# Patient Record
Sex: FEMALE | Race: BLACK OR AFRICAN AMERICAN | NOT HISPANIC OR LATINO | Employment: FULL TIME | URBAN - METROPOLITAN AREA
[De-identification: names, ages, dates, MRNs, and addresses within clinical notes are randomized per-mention and may not be internally consistent; named-entity substitution may affect disease eponyms.]

---

## 2017-03-01 ENCOUNTER — APPOINTMENT (OUTPATIENT)
Dept: RADIOLOGY | Facility: MEDICAL CENTER | Age: 22
End: 2017-03-01
Attending: EMERGENCY MEDICINE
Payer: MEDICAID

## 2017-03-01 ENCOUNTER — HOSPITAL ENCOUNTER (EMERGENCY)
Facility: MEDICAL CENTER | Age: 22
End: 2017-03-01
Attending: EMERGENCY MEDICINE
Payer: MEDICAID

## 2017-03-01 VITALS
RESPIRATION RATE: 16 BRPM | SYSTOLIC BLOOD PRESSURE: 122 MMHG | OXYGEN SATURATION: 99 % | TEMPERATURE: 98.6 F | DIASTOLIC BLOOD PRESSURE: 74 MMHG | HEART RATE: 81 BPM | HEIGHT: 64 IN | WEIGHT: 111.77 LBS | BODY MASS INDEX: 19.08 KG/M2

## 2017-03-01 DIAGNOSIS — F29 PSYCHOSIS, UNSPECIFIED PSYCHOSIS TYPE (HCC): ICD-10-CM

## 2017-03-01 LAB
ALBUMIN SERPL BCP-MCNC: 4.3 G/DL (ref 3.2–4.9)
ALBUMIN/GLOB SERPL: 1.4 G/DL
ALP SERPL-CCNC: 41 U/L (ref 30–99)
ALT SERPL-CCNC: 8 U/L (ref 2–50)
ANION GAP SERPL CALC-SCNC: 10 MMOL/L (ref 0–11.9)
AST SERPL-CCNC: 16 U/L (ref 12–45)
BASOPHILS # BLD AUTO: 0.3 % (ref 0–1.8)
BASOPHILS # BLD: 0.02 K/UL (ref 0–0.12)
BILIRUB SERPL-MCNC: 0.4 MG/DL (ref 0.1–1.5)
BUN SERPL-MCNC: 17 MG/DL (ref 8–22)
CALCIUM SERPL-MCNC: 9.6 MG/DL (ref 8.5–10.5)
CHLORIDE SERPL-SCNC: 107 MMOL/L (ref 96–112)
CO2 SERPL-SCNC: 22 MMOL/L (ref 20–33)
CREAT SERPL-MCNC: 0.85 MG/DL (ref 0.5–1.4)
EOSINOPHIL # BLD AUTO: 0.04 K/UL (ref 0–0.51)
EOSINOPHIL NFR BLD: 0.6 % (ref 0–6.9)
ERYTHROCYTE [DISTWIDTH] IN BLOOD BY AUTOMATED COUNT: 39.7 FL (ref 35.9–50)
GFR SERPL CREATININE-BSD FRML MDRD: >60 ML/MIN/1.73 M 2
GLOBULIN SER CALC-MCNC: 3 G/DL (ref 1.9–3.5)
GLUCOSE SERPL-MCNC: 95 MG/DL (ref 65–99)
HCG SERPL QL: NEGATIVE
HCT VFR BLD AUTO: 38.1 % (ref 37–47)
HGB BLD-MCNC: 12.1 G/DL (ref 12–16)
IMM GRANULOCYTES # BLD AUTO: 0.02 K/UL (ref 0–0.11)
IMM GRANULOCYTES NFR BLD AUTO: 0.3 % (ref 0–0.9)
INR PPP: 1.18 (ref 0.87–1.13)
LYMPHOCYTES # BLD AUTO: 1.67 K/UL (ref 1–4.8)
LYMPHOCYTES NFR BLD: 26.6 % (ref 22–41)
MCH RBC QN AUTO: 26.9 PG (ref 27–33)
MCHC RBC AUTO-ENTMCNC: 31.8 G/DL (ref 33.6–35)
MCV RBC AUTO: 84.9 FL (ref 81.4–97.8)
MONOCYTES # BLD AUTO: 0.51 K/UL (ref 0–0.85)
MONOCYTES NFR BLD AUTO: 8.1 % (ref 0–13.4)
NEUTROPHILS # BLD AUTO: 4.03 K/UL (ref 2–7.15)
NEUTROPHILS NFR BLD: 64.1 % (ref 44–72)
NRBC # BLD AUTO: 0 K/UL
NRBC BLD AUTO-RTO: 0 /100 WBC
PLATELET # BLD AUTO: 205 K/UL (ref 164–446)
PMV BLD AUTO: 10.5 FL (ref 9–12.9)
POTASSIUM SERPL-SCNC: 3.9 MMOL/L (ref 3.6–5.5)
PROT SERPL-MCNC: 7.3 G/DL (ref 6–8.2)
PROTHROMBIN TIME: 15.4 SEC (ref 12–14.6)
RBC # BLD AUTO: 4.49 M/UL (ref 4.2–5.4)
SODIUM SERPL-SCNC: 139 MMOL/L (ref 135–145)
WBC # BLD AUTO: 6.3 K/UL (ref 4.8–10.8)

## 2017-03-01 PROCEDURE — 85610 PROTHROMBIN TIME: CPT

## 2017-03-01 PROCEDURE — 96361 HYDRATE IV INFUSION ADD-ON: CPT

## 2017-03-01 PROCEDURE — 90791 PSYCH DIAGNOSTIC EVALUATION: CPT

## 2017-03-01 PROCEDURE — 85025 COMPLETE CBC W/AUTO DIFF WBC: CPT

## 2017-03-01 PROCEDURE — 36415 COLL VENOUS BLD VENIPUNCTURE: CPT

## 2017-03-01 PROCEDURE — 70450 CT HEAD/BRAIN W/O DYE: CPT

## 2017-03-01 PROCEDURE — 99285 EMERGENCY DEPT VISIT HI MDM: CPT

## 2017-03-01 PROCEDURE — 700105 HCHG RX REV CODE 258: Performed by: EMERGENCY MEDICINE

## 2017-03-01 PROCEDURE — 700111 HCHG RX REV CODE 636 W/ 250 OVERRIDE (IP): Performed by: EMERGENCY MEDICINE

## 2017-03-01 PROCEDURE — 96376 TX/PRO/DX INJ SAME DRUG ADON: CPT

## 2017-03-01 PROCEDURE — 84703 CHORIONIC GONADOTROPIN ASSAY: CPT

## 2017-03-01 PROCEDURE — 80053 COMPREHEN METABOLIC PANEL: CPT

## 2017-03-01 PROCEDURE — 96374 THER/PROPH/DIAG INJ IV PUSH: CPT

## 2017-03-01 PROCEDURE — 99284 EMERGENCY DEPT VISIT MOD MDM: CPT

## 2017-03-01 RX ORDER — LORAZEPAM 2 MG/ML
1 INJECTION INTRAMUSCULAR ONCE
Status: COMPLETED | OUTPATIENT
Start: 2017-03-01 | End: 2017-03-01

## 2017-03-01 RX ORDER — LORAZEPAM 1 MG/1
1 TABLET ORAL EVERY 8 HOURS PRN
Qty: 10 TAB | Refills: 0 | Status: SHIPPED | OUTPATIENT
Start: 2017-03-01 | End: 2017-03-02

## 2017-03-01 RX ORDER — QUETIAPINE FUMARATE 50 MG/1
50 TABLET, FILM COATED ORAL 2 TIMES DAILY
Qty: 60 TAB | Refills: 0 | Status: SHIPPED | OUTPATIENT
Start: 2017-03-01 | End: 2017-03-02

## 2017-03-01 RX ORDER — LORAZEPAM 2 MG/ML
1 INJECTION INTRAMUSCULAR ONCE
Status: DISCONTINUED | OUTPATIENT
Start: 2017-03-01 | End: 2017-03-01

## 2017-03-01 RX ORDER — SODIUM CHLORIDE 9 MG/ML
1000 INJECTION, SOLUTION INTRAVENOUS ONCE
Status: COMPLETED | OUTPATIENT
Start: 2017-03-01 | End: 2017-03-01

## 2017-03-01 RX ADMIN — LORAZEPAM 1 MG: 2 INJECTION INTRAMUSCULAR; INTRAVENOUS at 17:17

## 2017-03-01 RX ADMIN — SODIUM CHLORIDE 1000 ML: 9 INJECTION, SOLUTION INTRAVENOUS at 17:16

## 2017-03-01 RX ADMIN — LORAZEPAM 1 MG: 2 INJECTION INTRAMUSCULAR; INTRAVENOUS at 18:25

## 2017-03-01 ASSESSMENT — PAIN SCALES - GENERAL: PAINLEVEL_OUTOF10: 0

## 2017-03-01 NOTE — ED AVS SNAPSHOT
3/1/2017          Nunu Friedman  [address] [place] [address]    Dear Nunu:    [facility] wants to ensure your discharge home is safe and you or your loved ones have had all your questions answered regarding your care after you leave the hospital.    You may receive a telephone call within two days of your discharge.  This call is to make certain you understand your discharge instructions as well as ensure we provided you with the best care possible during your stay with us.     The call will only last approximately 3-5 minutes and will be done by a nurse.    Once again, we want to ensure your discharge home is safe and that you have a clear understanding of any next steps in your care.  If you have any questions or concerns, please do not hesitate to contact us, we are here for you.  Thank you for choosing [facility] for your healthcare needs.    Sincerely,    Alexey Mosqueda    [facility]

## 2017-03-01 NOTE — ED NOTES
Pt to triage .  Chief Complaint   Patient presents with   • Hallucinations     visual hallucinations increasing x 3 days    • Psych Eval     pt did not show up to work last 4 days and started having abnormal behavior for self.

## 2017-03-01 NOTE — ED AVS SNAPSHOT
Home Care Instructions                                                                                                                Nunu Friedman   MRN: [id number]    Department:  [facility], Emergency Dept   Date of Visit:  3/1/2017            [facility], Emergency Dept    [address] [address]    Phone:  [phone]      You were seen by     Nolan Hardy M.D.      Your Diagnosis Was     Psychosis, unspecified psychosis type     F29       These are the medications you received during your hospitalization from 03/01/2017 1519 to 03/01/2017 2145     Date/Time Order Dose Route Action    03/01/2017 1717 lorazepam (ATIVAN) injection 1 mg 1 mg Intravenous Given    03/01/2017 1716 NS infusion 1,000 mL 1,000 mL Intravenous New Bag    03/01/2017 1825 lorazepam (ATIVAN) injection 1 mg 1 mg Intravenous Given      Follow-up Information     1. Follow up with [facility].    Contact information    [address] [place] [id number] [phone]      Medication Information     Review all of your home medications and newly ordered medications with your primary doctor and/or pharmacist as soon as possible. Follow medication instructions as directed by your doctor and/or pharmacist.     Please keep your complete medication list with you and share with your physician. Update the information when medications are discontinued, doses are changed, or new medications (including over-the-counter products) are added; and carry medication information at all times in the event of emergency situations.               Medication List      START taking these medications        Instructions    lorazepam 1 MG Tabs   Commonly known as:  ATIVAN    Take 1 Tab by mouth every 8 hours as needed (agitation).   Dose:  1 mg               Procedures and tests performed during your visit     CBC WITH DIFFERENTIAL    COMP METABOLIC PANEL    CT-HEAD W/O    ESTIMATED GFR    HCG  Qual Serum    IV Saline Lock    PROTHROMBIN TIME        Discharge Instructions       Hallucinations and Delusions  You seem to be having hallucinations and/or delusions. You may be hearing voices that no one else can hear. This can seem very real to you. You may be having thoughts and fears that do not make sense to others. This condition can be due to mental disease like schizophrenia. It may be caused by a medical condition, such as an infection or electrolyte disturbance. These symptoms are also seen in drug abusers, especially those who use crack cocaine and amphetamines. Drugs like PCP, LSD, MDMA, peyote, and psilocybin can also cause frightening hallucinations and loss of control.  If your symptoms are due to drug abuse, your mental state should improve as the drug(s) leave your system. Someone you trust should be with you until you are better to protect you and calm your fears. Often tranquilizers are very helpful at controlling hallucinations, anxiety, and destructive behavior. Getting a proper diet and enough sleep is important to recovery. If your symptoms are not due to drugs, or do not improve over several days after stopping drug use, you need further medical or mental health care.  SEEK IMMEDIATE MEDICAL CARE IF:   · Your symptoms get worse, especially if you think your life is in danger  · You have violent or destructive thoughts.  Recovery is possible, but you have to get proper treatment and avoid drugs that are known to cause you trouble.  Document Released: 01/25/2006 Document Revised: 03/11/2013 Document Reviewed: 12/18/2006  ExitCare® Patient Information ©2014 [employer].            Patient Information     Patient Information    Following emergency treatment: all patient requiring follow-up care must return either to a private physician or a clinic if your condition worsens before you are able to obtain further medical attention, please return to the emergency room.     Billing  Information    At [facility], we work to make the billing process streamlined for our patients.  Our Representatives are here to answer any questions you may have regarding your hospital bill.  If you have insurance coverage and have supplied your insurance information to us, we will submit a claim to your insurer on your behalf.  Should you have any questions regarding your bill, we can be reached online or by phone as follows:  Online: You are able pay your bills online or live chat with our representatives about any billing questions you may have. We are here to help Monday - Friday from 8:00am to 7:30pm and 9:00am - 12:00pm on Saturdays.  Please visit [email or web address]  for more information.   Phone:  [phone] or [phone]    Please note that your emergency physician, surgeon, pathologist, radiologist, anesthesiologist, and other specialists are not employed by [facility] and will therefore bill separately for their services.  Please contact them directly for any questions concerning their bills at the numbers below:     [facility] Physician Services:  [phone]  [facility]:  [phone]  [facility]:  [phone]  [facility]:  [phone]    1. Your final bill may vary from the amount quoted upon discharge if all procedures are not complete at that time, or if your doctor has additional procedures of which we are not aware. You will receive an additional bill if you return to the Emergency Department at [facility] for suture removal regardless of the facility of which the sutures were placed.     2. Please arrange for settlement of this account at the emergency registration.    3. All self-pay accounts are due in full at the time of treatment.  If you are unable to meet this obligation then payment is expected within 4-5 days.     4. If you have had radiology studies (CT, X-ray, Ultrasound, MRI), you have  received a preliminary result during your emergency department visit. Please contact the radiology department [phone] to receive a copy of your final result. Please discuss the Final result with your primary physician or with the follow up physician provided.     Crisis Hotline:  [place] Crisis Hotline:  4-133-UNOJDTW or 1-172.311.4035  [place] Crisis Hotline:    1-640.523.3293 or 501-258-1846         ED Discharge Follow Up Questions    1. In order to provide you with very good care, we would like to follow up with a phone call in the next few days.  May we have your permission to contact you?     YES /  NO    2. What is the best phone number to call you? (       )_____-__________    3. What is the best time to call you?      Morning  /  Afternoon  /  Evening                   Patient Signature:  ____________________________________________________________    Date:  ____________________________________________________________

## 2017-03-01 NOTE — ED AVS SNAPSHOT
X[employer] Access Code: [email or web address]  Expires: 3/31/2017  9:45 PM    Your email address is not on file at [facility].  Email Addresses are required for you to sign up for [employer], please contact [phone] to verify your personal information and to provide your email address prior to attempting to register for X[employer].    Nunu Friedman  [address] [place] [address]    Xtreme Power  A secure, online tool to manage your health information     [facility]’s Xtreme Power® is a secure, online tool that connects you to your personalized health information from the privacy of your home -- day or night - making it very easy for you to manage your healthcare. Once the activation process is completed, you can even access your medical information using the Xtreme Power james, which is available for free in the Apple James store or Google Play store.     To learn more about Xtreme Power, visit [email or web address]    There are two levels of access available (as shown below):   My Chart Features  [facility] Primary Care Doctor [facility]  Specialists [facility]  Urgent  Care Non-[facility] Primary Care Doctor   Email your healthcare team securely and privately 24/7 X X X    Manage appointments: schedule your next appointment; view details of past/upcoming appointments X      Request prescription refills. X      View recent personal medical records, including lab and immunizations X X X X   View health record, including health history, allergies, medications X X X X   Read reports about your outpatient visits, procedures, consult and ER notes X X X X   See your discharge summary, which is a recap of your hospital and/or ER visit that includes your diagnosis, lab results, and care plan X X  X     How to register for [employer]:  Once your e-mail address has been verified, follow the following steps to sign up for [employer].     1. Go to  [email or web address]  2. Click on the Sign Up Now box, which takes you to the New Member Sign Up page. You will need  to provide the following information:  a. Enter your [employer] Access Code exactly as it appears at the top of this page. (You will not need to use this code after you’ve completed the sign-up process. If you do not sign up before the expiration date, you must request a new code.)   b. Enter your date of birth.   c. Enter your home email address.   d. Click Submit, and follow the next screen’s instructions.  3. Create a [employer] ID. This will be your [employer] login ID and cannot be changed, so think of one that is secure and easy to remember.  4. Create a [employer]com password. You can change your password at any time.  5. Enter your Password Reset Question and Answer. This can be used at a later time if you forget your password.   6. Enter your e-mail address. This allows you to receive e-mail notifications when new information is available in [employer]ues.com.  7. Click Sign Up. You can now view your health information.    For assistance activating your [employer] account, call [phone]

## 2017-03-02 ENCOUNTER — HOSPITAL ENCOUNTER (EMERGENCY)
Facility: MEDICAL CENTER | Age: 22
End: 2017-03-06
Attending: EMERGENCY MEDICINE
Payer: MEDICAID

## 2017-03-02 DIAGNOSIS — R44.3 HALLUCINATIONS: ICD-10-CM

## 2017-03-02 LAB
AMPHET UR QL SCN: NEGATIVE
BARBITURATES UR QL SCN: NEGATIVE
BENZODIAZ UR QL SCN: NEGATIVE
BZE UR QL SCN: NEGATIVE
CANNABINOIDS UR QL SCN: POSITIVE
MDMA UR QL SCN: NEGATIVE
METHADONE UR QL SCN: NEGATIVE
OPIATES UR QL SCN: NEGATIVE
OXYCODONE UR QL SCN: NEGATIVE
PCP UR QL SCN: NEGATIVE
POC BREATHALIZER: 0 PERCENT (ref 0–0.01)
PROPOXYPH UR QL SCN: NEGATIVE

## 2017-03-02 PROCEDURE — A9270 NON-COVERED ITEM OR SERVICE: HCPCS | Performed by: EMERGENCY MEDICINE

## 2017-03-02 PROCEDURE — 90791 PSYCH DIAGNOSTIC EVALUATION: CPT

## 2017-03-02 PROCEDURE — 99285 EMERGENCY DEPT VISIT HI MDM: CPT

## 2017-03-02 PROCEDURE — 36415 COLL VENOUS BLD VENIPUNCTURE: CPT

## 2017-03-02 PROCEDURE — 700102 HCHG RX REV CODE 250 W/ 637 OVERRIDE(OP): Performed by: EMERGENCY MEDICINE

## 2017-03-02 PROCEDURE — 80307 DRUG TEST PRSMV CHEM ANLYZR: CPT

## 2017-03-02 PROCEDURE — 302970 POC BREATHALIZER: Performed by: EMERGENCY MEDICINE

## 2017-03-02 RX ORDER — LORAZEPAM 1 MG/1
1 TABLET ORAL ONCE
Status: COMPLETED | OUTPATIENT
Start: 2017-03-02 | End: 2017-03-02

## 2017-03-02 RX ADMIN — LORAZEPAM 1 MG: 1 TABLET ORAL at 19:09

## 2017-03-02 ASSESSMENT — ENCOUNTER SYMPTOMS
NAUSEA: 1
HEADACHES: 0
ABDOMINAL PAIN: 0

## 2017-03-02 ASSESSMENT — PAIN SCALES - GENERAL: PAINLEVEL_OUTOF10: 0

## 2017-03-02 NOTE — ED NOTES
Chief Complaint   Patient presents with   • Hallucinations     Pt was seen for same yesterday, pt states hallucination are 3 D and in vivid color.

## 2017-03-02 NOTE — ED PROVIDER NOTES
"ED Provider Note    Scribed for Parth Sanchez M.D. by He Hilario. 3/2/2017, 11:40 AM.    Primary care provider: Pcp Pt States None  Means of arrival: EMS  History obtained from: Patient and sister  History limited by: None    CHIEF COMPLAINT  Chief Complaint   Patient presents with   • Hallucinations     Pt was seen for same yesterday, pt states hallucination are 3 D and in vivid color.       HPI  Nunu Friedman is a 21 y.o. Female with a history of cerebral palsy and anxiety who presents to the Emergency Department for evaluation of \"threatening\" hallucinations that are in 3D and vivid colors. The patient reports the quality of the hallucinations as, \"seeing the world as it is.\" Per the patient's sister, the patient has associated dehydration, \"white discoloration around nose,\" and nausea. She denies any headache, chest pain, or abdominal pain. The patient reports the quality Patient's sister states that the patient has two brain tumors. Patient's sister reports that the patient takes anti-depressants at her assisted living home. Additionally, the patient has not had Per patient's sister, her aunt had a history of psychiatric problems and mother may have some psychiatric problems. Patient last smoked marijuana three days ago.    REVIEW OF SYSTEMS  Review of Systems   Constitutional:        Positive for dehydration   HENT:        Positive for white discoloration around mouth   Cardiovascular: Negative for chest pain.   Gastrointestinal: Positive for nausea. Negative for abdominal pain.   Neurological: Negative for headaches.       PAST MEDICAL HISTORY   has a past medical history of Cerebral palsy (CMS-[facility]); Anxiety; Psychiatric disorder; and MS (multiple sclerosis) (CMS-HCC).    SURGICAL HISTORY  No pertinent recent surgical history    SOCIAL HISTORY  Social History   Substance Use Topics   • Smoking status: Current Every Day Smoker -- 0.25 packs/day     Types: Cigarettes        • Alcohol Use: Yes    "   Comment: socially      History   Drug Use Marijuana       FAMILY HISTORY  No family history on file.    CURRENT MEDICATIONS  No current facility-administered medications on file prior to encounter.     Current Outpatient Prescriptions on File Prior to Encounter   Medication Sig Dispense Refill   • lorazepam (ATIVAN) 1 MG Tab Take 1 Tab by mouth every 8 hours as needed (agitation). 10 Tab 0   • quetiapine (SEROQUEL) 50 MG tablet Take 1 Tab by mouth 2 times a day. 60 Tab 0     ALLERGIES  No Known Allergies    PHYSICAL EXAM  VITAL SIGNS: /86 mmHg  Pulse 80  Temp(Src) 36.7 °C (98.1 °F)  Resp 20  Wt 50.349 kg (111 lb)  LMP 02/01/2017    Constitutional:   No acute distress  HENT:  Moist mucous membranes  Eyes:  No conjunctivitis or icterus  Neck:  trachea is midline, no palpable thyroid  Lymphatic:  No cervical lymphadenopathy  Cardiovascular:  Regular rate and rhythm, no murmurs  Thorax & Lungs:  Normal breath sounds, no rhonchi  Abdomen:  Soft, Non-tender  Skin:.  no rash  Back:  Non-tender, no CVA tenderness  Extremities:   no edema  Vascular:  symmetric radial pulse  Neurologic:  Normal gross motor  Psychiatric:  flattened affect, paranoia    LABS  Labs Reviewed   URINE DRUG SCREEN - Abnormal; Notable for the following:     Cannabinoid Metab Positive (*)     All other components within normal limits   POC BREATHALIZER     All labs reviewed by me.    COURSE & MEDICAL DECISION MAKING  Pertinent Labs & Imaging studies reviewed. (See chart for details)    11:40 AM - Patient seen and examined at bedside. Ordered POC breathalizer and urine drug screen to evaluate her symptoms. The differential diagnoses include but are not limited to: Hallucinations, rule out psychiatric vs organic. Full work up was done yesterday.    12:09 PM Consult with Abhishek Adam, Behavioral Health, he agrees the place the patient on psychiatric hold.      Marissa alicea's D think we could have something ready to submit to this conference.  "[facility] is [facility] [place] do think we can have something to present by April. I would like to submit\" for this consultation and guide. We're considering doing a fair by them have every ready. It would be last  FINAL IMPRESSION  1. Hallucinations          I, He Hilario (Scribe), am scribing for, and in the presence of, Parth Sanchez M.D..    Electronically signed by: He Hilario (Scribe), 3/2/2017    I, Parth Sanchez M.D. personally performed the services described in this documentation, as scribed by He Hilario in my presence, and it is both accurate and complete.    The note accurately reflects work and decisions made by me.  Parth Sanchez  3/2/2017  3:46 PM      "
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
## 2017-03-02 NOTE — ED AVS SNAPSHOT
3/6/2017          Nunu Friedman  [address] [place] [address]    Dear Nunu:    [facility] wants to ensure your discharge home is safe and you or your loved ones have had all your questions answered regarding your care after you leave the hospital.    You may receive a telephone call within two days of your discharge.  This call is to make certain you understand your discharge instructions as well as ensure we provided you with the best care possible during your stay with us.     The call will only last approximately 3-5 minutes and will be done by a nurse.    Once again, we want to ensure your discharge home is safe and that you have a clear understanding of any next steps in your care.  If you have any questions or concerns, please do not hesitate to contact us, we are here for you.  Thank you for choosing [facility] for your healthcare needs.    Sincerely,    Alexey Mosqueda    [facility]

## 2017-03-02 NOTE — ED PROVIDER NOTES
The Medication Reconciliation process has been completed by interviewing the patient's family, they deny that she is on any medication and she did not fill the prescriptions that were given to her yesterday.    Allergies have been reviewed  Antibiotic use in 30 days - NONE

## 2017-03-02 NOTE — ED NOTES
Ear plugs provided to the pt. Pt now resting and sleeping in bed, respiration unlabored.  Report to Cristobal SHERMAN.

## 2017-03-02 NOTE — DISCHARGE INSTRUCTIONS
Hallucinations and Delusions  You seem to be having hallucinations and/or delusions. You may be hearing voices that no one else can hear. This can seem very real to you. You may be having thoughts and fears that do not make sense to others. This condition can be due to mental disease like schizophrenia. It may be caused by a medical condition, such as an infection or electrolyte disturbance. These symptoms are also seen in drug abusers, especially those who use crack cocaine and amphetamines. Drugs like PCP, LSD, MDMA, peyote, and psilocybin can also cause frightening hallucinations and loss of control.  If your symptoms are due to drug abuse, your mental state should improve as the drug(s) leave your system. Someone you trust should be with you until you are better to protect you and calm your fears. Often tranquilizers are very helpful at controlling hallucinations, anxiety, and destructive behavior. Getting a proper diet and enough sleep is important to recovery. If your symptoms are not due to drugs, or do not improve over several days after stopping drug use, you need further medical or mental health care.  SEEK IMMEDIATE MEDICAL CARE IF:   · Your symptoms get worse, especially if you think your life is in danger  · You have violent or destructive thoughts.  Recovery is possible, but you have to get proper treatment and avoid drugs that are known to cause you trouble.  Document Released: 01/25/2006 Document Revised: 03/11/2013 Document Reviewed: 12/18/2006  [employer]® Patient Information ©2014 [employer].

## 2017-03-02 NOTE — ED NOTES
Patient given discharge teaching and education. Verbalizes understanding. Given chance to ask questions, all questions answered. Educated patient of signs and symptoms to returned to ER, and educated patient they can return for any concerning symptoms. 0 prescriptions provided to patient. Education given to patient about medications.Patient states they have their belongings. Denies additional needs at this time. Reports pain is well controlled. Patient monitored every hour and PRN for safety and comfort. Patient via wheel chair out of department.
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
## 2017-03-02 NOTE — ED NOTES
Pt back to room from CT, result pending. Pt noted started her period, assisted to the restroom w/1 person assist and assisted in changing her underwear and gown, provided w/pad.

## 2017-03-02 NOTE — ED AVS SNAPSHOT
Home Care Instructions                                                                                                                Nunu Friedman   MRN: [id number]    Department:  [facility], Emergency Dept   Date of Visit:  3/2/2017            [facility], Emergency Dept    [address]    [place] [address]    Phone:  [phone]      You were seen by     1. Parth Sanchez M.D.    2. Nolan Hardy M.D.    3. Pretty Rizo D.O.    4. Radha Castellano M.D.    5. Guy G Gansert, M.D.    6. Hernandez Webber M.D.    7. Kira Drake M.D.    8. Neo Joseph D.O.    9. Jorge Mcrae M.D.    10. Timothy Chavez M.D.    11. Jonah Ferrer M.D.    12. Cash Mares M.D.    13. Bairon Vasquez M.D.      Your Diagnosis Was     Hallucinations     R44.3       These are the medications you received during your hospitalization from 03/02/2017 1050 to 03/06/2017 1434     Date/Time Order Dose Route Action    03/02/2017 1909 lorazepam (ATIVAN) tablet 1 mg 1 mg Oral Given    03/03/2017 1709 lorazepam (ATIVAN) tablet 2 mg 2 mg Oral Given    03/04/2017 2019 lorazepam (ATIVAN) tablet 1 mg 1 mg Oral Given    03/05/2017 1600 lorazepam (ATIVAN) tablet 1 mg 1 mg Oral Given    03/06/2017 1136 oxcarbazepine (TRILEPTAL) tablet 150 mg 150 mg Oral Given      Medication Information     Review all of your home medications and newly ordered medications with your primary doctor and/or pharmacist as soon as possible. Follow medication instructions as directed by your doctor and/or pharmacist.     Please keep your complete medication list with you and share with your physician. Update the information when medications are discontinued, doses are changed, or new medications (including over-the-counter products) are added; and carry medication information at all times in the event of emergency situations.               Medication List      Notice     You have not been  prescribed any medications.            Procedures and tests performed during your visit     Procedure/Test Number of Times Performed    ED CONSULT TO BEHAVIORAL HEALTH 1    IP CONSULT TO  2    NURSING COMMUNICATION 2    POC BREATHALIZER 1    URINE DRUG SCREEN 1            Patient Information     Patient Information    Following emergency treatment: all patient requiring follow-up care must return either to a private physician or a clinic if your condition worsens before you are able to obtain further medical attention, please return to the emergency room.     Billing Information    [facility] we work to make the billing process streamlined for our patients.  Our Representatives are here to answer any questions you may have regarding your hospital bill.  If you have insurance coverage and have supplied your insurance information to us, we will submit a claim to your insurer on your behalf.  Should you have any questions regarding your bill, we can be reached online or by phone as follows:  Online: You are able pay your bills online or live chat with our representatives about any billing questions you may have. We are here to help Monday - Friday from 8:00am to 7:30pm and 9:00am - 12:00pm on Saturdays.  Please visit [email or web address]  for more information.   Phone:  [phone] or [phone]    Please note that your emergency physician, surgeon, pathologist, radiologist, anesthesiologist, and other specialists are not employed by [facility] and will therefore bill separately for their services.  Please contact them directly for any questions concerning their bills at the numbers below:     Emer[facility] Physician Services:  [phone]  [facility]:  [phone]  [facility]:  [phone]  [facility]:  [phone]    1. Your final bill may vary from the amount quoted upon discharge if all procedures are not  complete at that time, or if your doctor has additional procedures of which we are not aware. You will receive an additional bill if you return to the Emergency Department at [facility] for suture removal regardless of the facility of which the sutures were placed.     2. Please arrange for settlement of this account at the emergency registration.    3. All self-pay accounts are due in full at the time of treatment.  If you are unable to meet this obligation then payment is expected within 4-5 days.     4. If you have had radiology studies (CT, X-ray, Ultrasound, MRI), you have received a preliminary result during your emergency department visit. Please contact the radiology department [phone] to receive a copy of your final result. Please discuss the Final result with your primary physician or with the follow up physician provided.     Crisis Hotline:  [place] Crisis Hotline:  9-408-MIWVEYE or 1-976.377.4551  [place] Crisis Hotline:    1-303.943.8771 or 223-521-4747         ED Discharge Follow Up Questions    1. In order to provide you with very good care, we would like to follow up with a phone call in the next few days.  May we have your permission to contact you?     YES /  NO    2. What is the best phone number to call you? (       )_____-__________    3. What is the best time to call you?      Morning  /  Afternoon  /  Evening                   Patient Signature:  ____________________________________________________________    Date:  ____________________________________________________________      Your appointments     Mar 07, 2017  2:30 PM   New Patient with KALEB Narvaez   [facility]    [address] [place] [id number]   [phone]           Please bring Photo ID, Insurance Cards, All Medication Bottles and copies of any legal documents (such as Living Will, Power of ) If speaking a language besides English please bring an adult . Please  arrive 30 minutes prior for check in and registration. You will be receiving a confirmation call a few days before your appointment from our automated call confirmation system.
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
## 2017-03-02 NOTE — ED AVS SNAPSHOT
[facility] Access Code: [email or web address]  Expires: 3/31/2017  9:45 PM    Your email address is not on file at [facility].  Email Addresses are required for you to sign up for [facility], please contact [phone] to verify your personal information and to provide your email address prior to attempting to register for [facility].    Nunu Friedman  [address] [place] [address]    [facility]  A secure, online tool to manage your health information     [employer]’s [facility]® is a secure, online tool that connects you to your personalized health information from the privacy of your home -- day or night - making it very easy for you to manage your healthcare. Once the activation process is completed, you can even access your medical information using the [facility] james, which is available for free in the Apple James store or Google Play store.     To learn more about [facility], visit [email or web address]    There are two levels of access available (as shown below):   My Chart Features  [facility] Primary Care Doctor [facility]  Specialists [facility]  Urgent  Care Non-[facility] Primary Care Doctor   Email your healthcare team securely and privately 24/7 X X X    Manage appointments: schedule your next appointment; view details of past/upcoming appointments X      Request prescription refills. X      View recent personal medical records, including lab and immunizations X X X X   View health record, including health history, allergies, medications X X X X   Read reports about your outpatient visits, procedures, consult and ER notes X X X X   See your discharge summary, which is a recap of your hospital and/or ER visit that includes your diagnosis, lab results, and care plan X X  X     How to register for [facility]:  Once your e-mail address has been verified, follow the following steps to sign up for [facility].     1. Go to  [email or web address]  2. Click on the Sign Up Now box, which takes you to the New Member Sign Up page. You will need  to provide the following information:  a. Enter your [employer] Access Code exactly as it appears at the top of this page. (You will not need to use this code after you’ve completed the sign-up process. If you do not sign up before the expiration date, you must request a new code.)   b. Enter your date of birth.   c. Enter your home email address.   d. Click Submit, and follow the next screen’s instructions.  3. Create a Sophono ID. This will be your Sophono login ID and cannot be changed, so think of one that is secure and easy to remember.  4. Create a Sophono password. You can change your password at any time.  5. Enter your Password Reset Question and Answer. This can be used at a later time if you forget your password.   6. Enter your e-mail address. This allows you to receive e-mail notifications when new information is available in Sophono.  7. Click Sign Up. You can now view your health information.    For assistance activating your [employer]ono account, call [phone]
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
## 2017-03-02 NOTE — ED NOTES
Patient's home medications have been reviewed by the pharmacy team.     Past Medical History   Diagnosis Date   • Cerebral palsy (CMS-HCC)    • Anxiety    • Psychiatric disorder      reactive detatchment d/o   • MS (multiple sclerosis) (CMS-HCC)        Patient's Medications   New Prescriptions    No medications on file   Previous Medications    No medications on file   Modified Medications    No medications on file   Discontinued Medications    LORAZEPAM (ATIVAN) 1 MG TAB    Take 1 Tab by mouth every 8 hours as needed (agitation).    QUETIAPINE (SEROQUEL) 50 MG TABLET    Take 1 Tab by mouth 2 times a day.          A:  Patient denies taking any home medications, therefore medications do not appear to be contributing to current complaints.     P:    No recommendations at this time.       Carol Castillo, PHARMD

## 2017-03-02 NOTE — ED NOTES
poc explained to pt and mom. piv established blood sent to lab. Instructed to call RN once ready to provide urine specimen.  NS infusing. Medicated per mar order. Allergies verified.

## 2017-03-03 PROCEDURE — A9270 NON-COVERED ITEM OR SERVICE: HCPCS | Performed by: EMERGENCY MEDICINE

## 2017-03-03 PROCEDURE — 700102 HCHG RX REV CODE 250 W/ 637 OVERRIDE(OP): Performed by: EMERGENCY MEDICINE

## 2017-03-03 RX ORDER — LORAZEPAM 1 MG/1
2 TABLET ORAL ONCE
Status: COMPLETED | OUTPATIENT
Start: 2017-03-03 | End: 2017-03-03

## 2017-03-03 RX ADMIN — LORAZEPAM 2 MG: 1 TABLET ORAL at 17:09

## 2017-03-03 ASSESSMENT — PAIN SCALES - GENERAL: PAINLEVEL_OUTOF10: 0

## 2017-03-03 NOTE — DISCHARGE PLANNING
Medical Social Work    Referral: Legal Hold    Intervention: Legal Hold Paperwork given to SW by [employer] ALINA Weiss    Legal Hold Initiated: Date: 3/2/17 Time: 1235    Patient’s Insurance Listed on Face Sheet: Medicaid FFS    Referrals sent to:[facility]    This referral contains the following information:  1) Face sheet _x___  2) Page 1 and Page 2 of Legal Hold _x___  3) Alert Team Assessment/Psych Assessment _x___  4) Head to toe physical exam __x__  5) Urine Drug Screen _x___  6) Blood Alcohol __x__  7) Vital signs __x__  8) Pregnancy test when applicable _x__  9) Medications list __x__  SW Supervisor Faxed referral.    Plan: Patient will transfer to mental health facility once acceptance is obtained

## 2017-03-03 NOTE — ED NOTES
Pt resting in bed.  Understands  Plan of care.  Personal belonging bag X1 labeled and placed in ambulance bay.

## 2017-03-03 NOTE — ED NOTES
Pt medicated w/ ativan.  Sister at the bedside and appears to be calming for the pt.  Report given to Mauricio SHERMAN

## 2017-03-03 NOTE — ED NOTES
In to rm to see pt.  Pt very anxious & tearful.  ERP notified, orders received. Pt also reports that she is on her period.  Pt in street clothes.  Pt given a gown, underwear and a pad.  amb to restroom w/ sister to change.

## 2017-03-03 NOTE — DISCHARGE PLANNING
Medical Social Work     Referral: Transfer to [place]       Intervention: SW arranged transport through [employer] and paid via approved services due to pending medicaid. Mother is transporting with son. Expected transport time was 5:15 p.m. However delayed due to fax not received. Re-faxing now. Mother notified and waiting with son at bedside.     Plan: SW to remain available for future needs

## 2017-03-03 NOTE — DISCHARGE PLANNING
Sw received notice from RN that pt was awaiting transfer and had been accepted by [facility]. SW contacted [facility], pt has not been accepted, pt referral has not been received.     JOSE completed pt packet, faxed to [facility] & [facility], added to trackboard.

## 2017-03-04 PROCEDURE — 700102 HCHG RX REV CODE 250 W/ 637 OVERRIDE(OP): Performed by: EMERGENCY MEDICINE

## 2017-03-04 PROCEDURE — A9270 NON-COVERED ITEM OR SERVICE: HCPCS | Performed by: EMERGENCY MEDICINE

## 2017-03-04 RX ORDER — LORAZEPAM 1 MG/1
1 TABLET ORAL ONCE
Status: COMPLETED | OUTPATIENT
Start: 2017-03-04 | End: 2017-03-04

## 2017-03-04 RX ADMIN — LORAZEPAM 1 MG: 1 TABLET ORAL at 20:19

## 2017-03-04 NOTE — ED PROVIDER NOTES
ED Provider Note    Patient continues to a transfer to an inpatient psychiatric facility. Patient required medication for anxiety earlier in the evening. She was visited by her mother. Patient has had no further complaints at this time.

## 2017-03-04 NOTE — ED PROVIDER NOTES
ED PROVIDER NOTE    Scribed for Kira Drake M.D. by Dianne Montero. 3/4/2017, 11:48 AM.    This is an addendum to the note on Nunu Friedman. For further details and full chart entry, see the previously signed ED Provider Note written by Dr. Sanchez (ERP).      5:50 AM - I discussed the patient's case with Dr. Rizo (ERP) who will transfer care of the patient to me at this time.     11:48 AM - The patient's continuing management included a recheck. The patient is currently sleeping comfortably in her bed. Her most recent set of vitals at this time are /78 mmHg  Pulse 87  Temp(Src) 37.4 °C (99.3 °F) (Temporal)  Resp 16  Wt 50.349 kg (111 lb)  SpO2 100%  LMP 02/01/2017    FINAL IMPRESSION   1. Hallucinations       Dianne AVILA (Scribe), am scribing for, and in the presence of, Kira Drake M.D..    Electronically signed by: Dianne Montero (Scribe), 3/4/2017    IKira M.D. personally performed the services described in this documentation, as scribed by Dianne Montero in my presence, and it is both accurate and complete.    The note accurately reflects work and decisions made by me.  Kira Drake  3/4/2017  12:08 PM

## 2017-03-04 NOTE — ED PROVIDER NOTES
ED Provider Note    Addendum: Please see the initial history and physical for details.  In summary, the patient presents with acute psychosis.  The patient meets criteria for an involuntary committal under legal 2000.  The patient's head notes duration status.  Repeat examination is unremarkable.  The patient is awaiting transfer to a psychiatric facility.

## 2017-03-05 PROCEDURE — A9270 NON-COVERED ITEM OR SERVICE: HCPCS | Performed by: EMERGENCY MEDICINE

## 2017-03-05 PROCEDURE — 700102 HCHG RX REV CODE 250 W/ 637 OVERRIDE(OP): Performed by: EMERGENCY MEDICINE

## 2017-03-05 RX ORDER — LORAZEPAM 1 MG/1
1 TABLET ORAL ONCE
Status: COMPLETED | OUTPATIENT
Start: 2017-03-05 | End: 2017-03-05

## 2017-03-05 RX ADMIN — LORAZEPAM 1 MG: 1 TABLET ORAL at 16:00

## 2017-03-05 NOTE — ED NOTES
Pt is crying.  States she is scared.  Is hearing voices and having visual hallucinations.  Sister is at bedside.

## 2017-03-05 NOTE — ED NOTES
Pt c/o difficulty sleeping.  Pt requesting ativan.  ERP aware.  Orders received.  Pt medicated per mar.

## 2017-03-05 NOTE — ED NOTES
Report recieved from Mauricio SHERMAN and I assume care of pt.  She appears to be sleeping at this time.

## 2017-03-05 NOTE — DISCHARGE PLANNING
Medical SW    Referral: Sw received call from [facility].    Intervention: They received pt's referral, but do not have any beds open today.    Plan: Sw to assist w/ d/c planning as needed.

## 2017-03-06 ENCOUNTER — PATIENT OUTREACH (OUTPATIENT)
Dept: HEALTH INFORMATION MANAGEMENT | Facility: OTHER | Age: 22
End: 2017-03-06

## 2017-03-06 VITALS
RESPIRATION RATE: 15 BRPM | DIASTOLIC BLOOD PRESSURE: 65 MMHG | SYSTOLIC BLOOD PRESSURE: 106 MMHG | WEIGHT: 111 LBS | HEART RATE: 96 BPM | OXYGEN SATURATION: 98 % | BODY MASS INDEX: 19.04 KG/M2 | TEMPERATURE: 98.4 F

## 2017-03-06 PROCEDURE — 700102 HCHG RX REV CODE 250 W/ 637 OVERRIDE(OP): Performed by: PSYCHIATRY & NEUROLOGY

## 2017-03-06 PROCEDURE — A9270 NON-COVERED ITEM OR SERVICE: HCPCS | Performed by: PSYCHIATRY & NEUROLOGY

## 2017-03-06 RX ORDER — OXCARBAZEPINE 150 MG/1
150 TABLET, FILM COATED ORAL 2 TIMES DAILY
Status: DISCONTINUED | OUTPATIENT
Start: 2017-03-06 | End: 2017-03-06 | Stop reason: HOSPADM

## 2017-03-06 RX ADMIN — OXCARBAZEPINE 150 MG: 150 TABLET ORAL at 11:36
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
## 2017-03-06 NOTE — ED NOTES
Pt awake in bed, resp are even and unlabored, no distress noted, sitter outside of room in direct observation of pt

## 2017-03-06 NOTE — ED NOTES
Pt continues to sleep with hob elevated, laying on rt side, resp are even and unlabored, no distress noted, sitter outside of room in direct observation of pt

## 2017-03-06 NOTE — ED NOTES
Pt sleeping in bed, resp are even and unlabored, no distress noted, sitter outside of room in direct observation of pt

## 2017-03-06 NOTE — ED NOTES
Pt continues to sleep, pt resp are even and unlabored, no distress noted sitter outside of room in direct observation of pt

## 2017-03-06 NOTE — ED NOTES
Pt sleeping on rt side in bed, resp are even and unlabored, no distress noted, sitter outside of room in direct observation of pt

## 2017-03-06 NOTE — ED PROVIDER NOTES
ED PROVIDER NOTE    Scribed for Bairon Vasquez M.D. by Yolanda Apple. 3/6/2017, 12:17 PM.    This is an addendum to the note on Nunu Friedman. For further details and full chart entry, see the previously signed ED Provider Note written by Dr. Sanchez ([facility]).      12:17 PM Patient reevaluated at bedside. Patient is resting and has no complaints.       Yolanda AVILA (Vern), am scribing for, and in the presence of, Bairon Vasquez M.D..  Electronically signed by: Yolanda Apple (Vern), 3/6/2017  Bairon AVILA M.D. personally performed the services described in this documentation, as scribed by Yolanda Apple in my presence, and it is both accurate and complete.    The note accurately reflects work and decisions made by me.  Bairon Vasquez  3/6/2017  1:41 PM

## 2017-03-06 NOTE — PSYCHIATRY
Full note to follow.     Pt seen adn hallucinations resolved. She has had some mood related issues, however. She didn't like the way she felt on zoloft or seroquel, which she has used in the past. She has aged out of the foster care system. Some mood lability. Possible history of seizures in the past.     Starting trileptal 150mg po tid   Did well on medication, tolerated, and reexamined after several hours. No suicidal ideation or psychosis, legal hold was discontinued.

## 2017-03-06 NOTE — ED PROVIDER NOTES
The patient has been here for over 79 hours awaiting transfer to mental health. She is smiling and interactive, has no complaints, is currently visiting with a friend. She denies any current visual hallucinations, said the Ativan helped. She has been eating and drinking.  Patient will be transferred to mental health and accepted.    Jonah Ferrer M.D.

## 2017-03-06 NOTE — ED NOTES
Pt sleeping in bed, no distress noted, laying on rt side, sitter outside of room in direct observation of pt

## 2017-03-06 NOTE — ED NOTES
"Patient showered and given clean clothing. Patient verbalizes understanding of discharge instructions. Patient lives \"right up the street\" and prefers to walk. Patient ambulatory to and from shower and to discharge with steady gait. NAD or deficits noted at time of discharge  "

## 2017-03-06 NOTE — DISCHARGE PLANNING
Care Transition Team Discharge Planning       Referral: IP consult    Intervention: SW received consult asking for information on [facility], [facility], Medical Records and her medicaid status. SW called scheduling to have them come down and schedule appointment for [facility]. SW printed contact information for [facility] and gave to pt. JOSE printed clinic list with [facility] inforation to provide to pt. SW checked pt Medicaid status, currently in good standing. JOSE provided pt with [facility] number.    Plan: SW will follow up and provide support as necessary.

## 2017-03-06 NOTE — ED NOTES
Pt sleeping, resp are even and unlabored, no distress noted, sitter outside of room in direct observation of pt

## 2017-03-07 NOTE — DISCHARGE PLANNING
"Medical Social Work    Ongoing: MSW received a call from KELLY Iraheta that pt was D/C'd this afternoon and her family is here asking why they were not contacted when pt was D/C'd so they could pick pt up.  MSW reviewed pt's chart and pt was D/C'd off her legal hold by Psychiatry and per nursing notes pt stated she would walk \"home\".  MSW met with pt's sister, Cristhian ([phone]) and pt's mom, Marci ([phone]) who are listed as pt's emergency contacts.  MSW informed pt's family that pt was taken off her legal hold by Psychiatry and was D/C'd.  Pt's family is upset as pt has \"no where to go\" and isn't safe on the streets due to her schizophrenia.  MSW explained the legal hold process and criteria for holding pts.  Pt's family was advised that per Psychiatry pt was doing much better today on medication and was provided with resources for follow up.  Pt's family stated that they were \"promised\" by nursing staff that they would be contacted before pt D/C'd.  MSW apologized for that and informed pt's family that as long as pt is an adult and able to make her own decisions she can make the necessary calls to family or request the hospital to assist her in contacting her family.  Pt's family is not happy with the outcome and are worried about where pt is; however, they are understanding the process.      Plan: Nothing further.  "

## 2017-04-02 ENCOUNTER — HOSPITAL ENCOUNTER (EMERGENCY)
Facility: MEDICAL CENTER | Age: 22
End: 2017-04-04
Attending: EMERGENCY MEDICINE
Payer: MEDICAID

## 2017-04-02 DIAGNOSIS — F29 PSYCHOSIS, UNSPECIFIED PSYCHOSIS TYPE (HCC): ICD-10-CM

## 2017-04-02 LAB
ALBUMIN SERPL BCP-MCNC: 4.7 G/DL (ref 3.2–4.9)
ALBUMIN/GLOB SERPL: 1.4 G/DL
ALP SERPL-CCNC: 49 U/L (ref 30–99)
ALT SERPL-CCNC: 65 U/L (ref 2–50)
ANION GAP SERPL CALC-SCNC: 7 MMOL/L (ref 0–11.9)
APAP SERPL-MCNC: <10 UG/ML (ref 10–30)
AST SERPL-CCNC: 30 U/L (ref 12–45)
BASOPHILS # BLD AUTO: 0 % (ref 0–1.8)
BASOPHILS # BLD: 0 K/UL (ref 0–0.12)
BILIRUB SERPL-MCNC: 0.3 MG/DL (ref 0.1–1.5)
BUN SERPL-MCNC: 18 MG/DL (ref 8–22)
CALCIUM SERPL-MCNC: 9.7 MG/DL (ref 8.5–10.5)
CHLORIDE SERPL-SCNC: 106 MMOL/L (ref 96–112)
CO2 SERPL-SCNC: 27 MMOL/L (ref 20–33)
CREAT SERPL-MCNC: 0.89 MG/DL (ref 0.5–1.4)
EOSINOPHIL # BLD AUTO: 0 K/UL (ref 0–0.51)
EOSINOPHIL NFR BLD: 0 % (ref 0–6.9)
ERYTHROCYTE [DISTWIDTH] IN BLOOD BY AUTOMATED COUNT: 40.2 FL (ref 35.9–50)
ETHANOL BLD-MCNC: 0 G/DL
GFR SERPL CREATININE-BSD FRML MDRD: >60 ML/MIN/1.73 M 2
GLOBULIN SER CALC-MCNC: 3.3 G/DL (ref 1.9–3.5)
GLUCOSE SERPL-MCNC: 87 MG/DL (ref 65–99)
HCG SERPL QL: NEGATIVE
HCT VFR BLD AUTO: 38.6 % (ref 37–47)
HGB BLD-MCNC: 12.4 G/DL (ref 12–16)
LYMPHOCYTES # BLD AUTO: 2.07 K/UL (ref 1–4.8)
LYMPHOCYTES NFR BLD: 39.1 % (ref 22–41)
MANUAL DIFF BLD: NORMAL
MCH RBC QN AUTO: 27.1 PG (ref 27–33)
MCHC RBC AUTO-ENTMCNC: 32.1 G/DL (ref 33.6–35)
MCV RBC AUTO: 84.3 FL (ref 81.4–97.8)
MONOCYTES # BLD AUTO: 0.09 K/UL (ref 0–0.85)
MONOCYTES NFR BLD AUTO: 1.7 % (ref 0–13.4)
MORPHOLOGY BLD-IMP: NORMAL
NEUTROPHILS # BLD AUTO: 3.14 K/UL (ref 2–7.15)
NEUTROPHILS NFR BLD: 58.3 % (ref 44–72)
NEUTS BAND NFR BLD MANUAL: 0.9 % (ref 0–10)
NRBC # BLD AUTO: 0 K/UL
NRBC BLD AUTO-RTO: 0 /100 WBC
OVALOCYTES BLD QL SMEAR: NORMAL
PLATELET # BLD AUTO: 212 K/UL (ref 164–446)
PLATELET BLD QL SMEAR: NORMAL
PMV BLD AUTO: 10.3 FL (ref 9–12.9)
POIKILOCYTOSIS BLD QL SMEAR: NORMAL
POTASSIUM SERPL-SCNC: 3.5 MMOL/L (ref 3.6–5.5)
PROT SERPL-MCNC: 8 G/DL (ref 6–8.2)
RBC # BLD AUTO: 4.58 M/UL (ref 4.2–5.4)
RBC BLD AUTO: PRESENT
SODIUM SERPL-SCNC: 140 MMOL/L (ref 135–145)
VARIANT LYMPHS BLD QL SMEAR: NORMAL
WBC # BLD AUTO: 5.3 K/UL (ref 4.8–10.8)

## 2017-04-02 PROCEDURE — 80307 DRUG TEST PRSMV CHEM ANLYZR: CPT

## 2017-04-02 PROCEDURE — 36415 COLL VENOUS BLD VENIPUNCTURE: CPT

## 2017-04-02 PROCEDURE — 90791 PSYCH DIAGNOSTIC EVALUATION: CPT

## 2017-04-02 PROCEDURE — 96374 THER/PROPH/DIAG INJ IV PUSH: CPT

## 2017-04-02 PROCEDURE — 700111 HCHG RX REV CODE 636 W/ 250 OVERRIDE (IP): Performed by: EMERGENCY MEDICINE

## 2017-04-02 PROCEDURE — 96375 TX/PRO/DX INJ NEW DRUG ADDON: CPT

## 2017-04-02 PROCEDURE — 85007 BL SMEAR W/DIFF WBC COUNT: CPT

## 2017-04-02 PROCEDURE — 99285 EMERGENCY DEPT VISIT HI MDM: CPT

## 2017-04-02 PROCEDURE — 87086 URINE CULTURE/COLONY COUNT: CPT

## 2017-04-02 PROCEDURE — 80053 COMPREHEN METABOLIC PANEL: CPT

## 2017-04-02 PROCEDURE — 85027 COMPLETE CBC AUTOMATED: CPT

## 2017-04-02 PROCEDURE — 84703 CHORIONIC GONADOTROPIN ASSAY: CPT

## 2017-04-02 PROCEDURE — 81001 URINALYSIS AUTO W/SCOPE: CPT

## 2017-04-02 RX ORDER — HALOPERIDOL 5 MG/ML
5 INJECTION INTRAMUSCULAR ONCE
Status: COMPLETED | OUTPATIENT
Start: 2017-04-02 | End: 2017-04-02

## 2017-04-02 RX ORDER — LORAZEPAM 2 MG/ML
1 INJECTION INTRAMUSCULAR ONCE
Status: COMPLETED | OUTPATIENT
Start: 2017-04-02 | End: 2017-04-02

## 2017-04-02 RX ADMIN — HALOPERIDOL LACTATE 5 MG: 5 INJECTION, SOLUTION INTRAMUSCULAR at 20:24

## 2017-04-02 RX ADMIN — LORAZEPAM 1 MG: 2 INJECTION INTRAMUSCULAR at 20:24

## 2017-04-02 ASSESSMENT — LIFESTYLE VARIABLES: DO YOU DRINK ALCOHOL: NO

## 2017-04-02 ASSESSMENT — PAIN SCALES - WONG BAKER: WONGBAKER_NUMERICALRESPONSE: HURTS A WHOLE LOT

## 2017-04-03 LAB
AMPHET UR QL SCN: NEGATIVE
APPEARANCE UR: ABNORMAL
BACTERIA #/AREA URNS HPF: ABNORMAL /HPF
BARBITURATES UR QL SCN: NEGATIVE
BENZODIAZ UR QL SCN: NEGATIVE
BILIRUB UR QL STRIP.AUTO: NEGATIVE
BZE UR QL SCN: NEGATIVE
CANNABINOIDS UR QL SCN: NEGATIVE
COLOR UR: YELLOW
CULTURE IF INDICATED INDCX: YES UA CULTURE
EPI CELLS #/AREA URNS HPF: ABNORMAL /HPF
GLUCOSE UR STRIP.AUTO-MCNC: NEGATIVE MG/DL
KETONES UR STRIP.AUTO-MCNC: ABNORMAL MG/DL
LEUKOCYTE ESTERASE UR QL STRIP.AUTO: ABNORMAL
MDMA UR QL SCN: NEGATIVE
METHADONE UR QL SCN: NEGATIVE
MICRO URNS: ABNORMAL
MUCOUS THREADS #/AREA URNS HPF: ABNORMAL /HPF
NITRITE UR QL STRIP.AUTO: NEGATIVE
OPIATES UR QL SCN: NEGATIVE
OXYCODONE UR QL SCN: NEGATIVE
PCP UR QL SCN: NEGATIVE
PH UR STRIP.AUTO: 5.5 [PH]
PROPOXYPH UR QL SCN: NEGATIVE
PROT UR QL STRIP: 30 MG/DL
RBC # URNS HPF: >150 /HPF
RBC UR QL AUTO: ABNORMAL
SP GR UR STRIP.AUTO: 1.03
WBC #/AREA URNS HPF: ABNORMAL /HPF

## 2017-04-03 PROCEDURE — A9270 NON-COVERED ITEM OR SERVICE: HCPCS | Performed by: PSYCHIATRY & NEUROLOGY

## 2017-04-03 PROCEDURE — 700102 HCHG RX REV CODE 250 W/ 637 OVERRIDE(OP): Performed by: EMERGENCY MEDICINE

## 2017-04-03 PROCEDURE — A9270 NON-COVERED ITEM OR SERVICE: HCPCS | Performed by: EMERGENCY MEDICINE

## 2017-04-03 PROCEDURE — 700102 HCHG RX REV CODE 250 W/ 637 OVERRIDE(OP): Performed by: PSYCHIATRY & NEUROLOGY

## 2017-04-03 RX ORDER — NITROFURANTOIN 25; 75 MG/1; MG/1
100 CAPSULE ORAL 2 TIMES DAILY WITH MEALS
Status: DISCONTINUED | OUTPATIENT
Start: 2017-04-03 | End: 2017-04-03

## 2017-04-03 RX ORDER — NITROFURANTOIN 25; 75 MG/1; MG/1
100 CAPSULE ORAL 2 TIMES DAILY
Qty: 20 CAP | Refills: 0 | Status: ON HOLD | OUTPATIENT
Start: 2017-04-03 | End: 2018-06-25

## 2017-04-03 RX ORDER — OXCARBAZEPINE 150 MG/1
150 TABLET, FILM COATED ORAL 2 TIMES DAILY
Status: DISCONTINUED | OUTPATIENT
Start: 2017-04-03 | End: 2017-04-04 | Stop reason: HOSPADM

## 2017-04-03 RX ORDER — NITROFURANTOIN 25; 75 MG/1; MG/1
100 CAPSULE ORAL 2 TIMES DAILY WITH MEALS
Status: DISCONTINUED | OUTPATIENT
Start: 2017-04-03 | End: 2017-04-04 | Stop reason: HOSPADM

## 2017-04-03 RX ORDER — ARIPIPRAZOLE 10 MG/1
5 TABLET ORAL DAILY
Status: DISCONTINUED | OUTPATIENT
Start: 2017-04-03 | End: 2017-04-04 | Stop reason: HOSPADM

## 2017-04-03 RX ADMIN — NITROFURANTOIN MONOHYDRATE AND NITROFURANTOIN MACROCRYSTALLINE 100 MG: 75; 25 CAPSULE ORAL at 18:30

## 2017-04-03 RX ADMIN — NITROFURANTOIN MONOHYDRATE AND NITROFURANTOIN MACROCRYSTALLINE 100 MG: 75; 25 CAPSULE ORAL at 09:05

## 2017-04-03 RX ADMIN — ARIPIPRAZOLE 5 MG: 10 TABLET ORAL at 13:16

## 2017-04-03 RX ADMIN — NITROFURANTOIN MONOHYDRATE AND NITROFURANTOIN MACROCRYSTALLINE 100 MG: 75; 25 CAPSULE ORAL at 01:32

## 2017-04-03 RX ADMIN — OXCARBAZEPINE 150 MG: 150 TABLET, FILM COATED ORAL at 13:16

## 2017-04-03 ASSESSMENT — PAIN SCALES - GENERAL
PAINLEVEL_OUTOF10: 0
PAINLEVEL_OUTOF10: 0

## 2017-04-03 NOTE — DISCHARGE PLANNING
Medical Social Work    Referral: Legal Hold    Intervention: Legal Hold Paperwork given to SW by [facility] RN: Lexie    Legal Hold Initiated: Date: 04/02/2017  Time: 2100    Legal Hold faxed: Date: 04/03/2017  Time: 0035    Patient’s Insurance Listed on Face Sheet: None    Referrals sent to: [facility]    Plan: Patient will transfer to mental health facility once acceptance is obtained.
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
## 2017-04-03 NOTE — ED NOTES
Patient's home medications have been reviewed by the pharmacy team.     Past Medical History   Diagnosis Date   • Cerebral palsy (CMS-HCC)    • Anxiety    • Psychiatric disorder      reactive detatchment d/o   • MS (multiple sclerosis) (CMS-HCC)      Patient's Medications   New Prescriptions    NITROFURANTOIN MONOHYDR MACRO (MACROBID) 100 MG CAP    Take 1 Cap by mouth 2 times a day.   Previous Medications    No medications on file   Modified Medications    No medications on file   Discontinued Medications    No medications on file     A:  Medications do not appear to be contributing to current complaints. Admitted with hallucinations and reported history of psychiatric concerns. Patient medicated acutely and has since improved. Oxcarbazepine & aripiprazole started during this encounter. Patient otherwise without charted complaints or agitation while pending transfer to psychiatric facility. Nitrofurantoin started with tentative EOT 4/5/2017.    P:    No recommendations at this time. Home medications have been reordered as appropriate (none noted prior). Would recommend continued follow-up outpatient provider (including thyroid + CBC follow-up). Recommend continue supportive care pending transfer.    He Calderon, PHARMD

## 2017-04-03 NOTE — ED NOTES
"Pt sister now at bedside.  Pt sister reports pt was saying that she was telling her that there are \"demons inside of her.\"    IV access obtained.  Blood drawn and sent to lab.    "

## 2017-04-03 NOTE — ED NOTES
"Pt BIB EMS From home.  When EMS arrived pt was on floor \"thrashing about.\"  Pt has psychiatric history and has been on a legal hold in the past but never was sent to a psychiatric facility or had any psychiatric treatment.  Pt easily calmed with voice.   Pt sister on her way in to give more history.  Pt a/o 2-3.  Pt eyes dilated 5mm, equal and reactive.  Pt c/o head pain and \"pain all over.\"  Pt has been taking over the counter sleeping aids but they has not been working per pt.  ERP to see.  "

## 2017-04-03 NOTE — ED NOTES
Med rec complete per sister via phone  Per family Pt doesn't take any Rx at this time  Allergies reviewed

## 2017-04-03 NOTE — ED NOTES
Pt up to the bathroom to void urine specimen. UA sent to lab. Pt alert and gait unsteady with assistance of one.

## 2017-04-03 NOTE — ED NOTES
Pt resting in bed. Respirations easy and patient denies needs at this time. Sitter outside the room.

## 2017-04-03 NOTE — ED PROVIDER NOTES
"ED Provider Note    Addendum: The patient has been evaluated, and the patient is resting comfortably at this point in time. The patient is medically stable for transfer.    /63 mmHg  Pulse 88  Temp(Src) 36.9 °C (98.5 °F)  Resp 15  Ht 1.626 m (5' 4\")  Wt 52.164 kg (115 lb)  BMI 19.73 kg/m2  SpO2 97%    "

## 2017-04-03 NOTE — DISCHARGE PLANNING
Care Transition Team Discharge Planning       Referral: Legal Extension Request    Intervention: Legal hold extension request including orders, face sheet, and legal hold were faxed to Clementina Sainz's office.     Plan: SW will follow up to ensure legal hold has been extended.

## 2017-04-03 NOTE — ED PROVIDER NOTES
ED Provider Note    Scribed for Susie Bowles M.D. by Lyudmila Be. 4/2/2017, 8:11 PM.    Primary care provider: Pcp Pt States None  Means of arrival: ambulance  History obtained from: patient's sister  History limited by: patient's altered mental status    CHIEF COMPLAINT  Chief Complaint   Patient presents with   • Psych Eval   • Head Ache   • Pain     body pain       HPI  Nunu Friedman is a 21 y.o. female with a history of psychiatric problems who presents to the Emergency Department for a psych evaluation secondary to increased agitations and hallucinations of demons possessing her onset today. Per sister, who the patient normally resides with, she has not been taking care of herself lately and is in an unhealthy relationship. One month ago the patient experienced a similar episode and was taken here for evaluation, however, she signed out AMA and was unable to get the help that she needed. No complaints at this time. Sister also states that the patient takes a large amount of Advil PM on an almost daily basis, sometimes 3-4x every hour. Further history limited secondary to patient's altered mental status.    REVIEW OF SYSTEMS  Pertinent positives include hallucinations, increased agitation. Pertinent negatives include no complaints at this time. ROS limited secondary to patient's altered mental status.  See HPI for further details.     PAST MEDICAL HISTORY  Past Medical History   Diagnosis Date   • Cerebral palsy (CMS-[facility])    • Anxiety    • Psychiatric disorder      reactive detatchment d/o   • MS (multiple sclerosis) (CMS-[facility])        SURGICAL HISTORY  History reviewed. No pertinent past surgical history.    SOCIAL HISTORY  Social History   Substance Use Topics   • Smoking status: Current Every Day Smoker -- 0.25 packs/day     Types: Cigarettes   • Smokeless tobacco: None   • Alcohol Use: Yes      Comment: socially      History   Drug Use No       FAMILY HISTORY  No pertinent family history    CURRENT  "MEDICATIONS  Unknown    ALLERGIES  No Known Allergies    PHYSICAL EXAM  VITAL SIGNS: /88 mmHg  Pulse 88  Resp 16  Ht 1.626 m (5' 4\")  Wt 52.164 kg (115 lb)  BMI 19.73 kg/m2  SpO2 99%  Vitals reviewed.    Consitutional: Well-developed, well-nourished. agitated  HENT: Normocephalic, right external ear normal, left external ear normal, oropharynx clear and moist.  Eyes: Conjunctivae normal, extraocular movements normal. Negative for: discharge in right and left eye, icterus.  Neck: Range of motion normal, supple. Negative for cervical adenopathy.  Cardiovascular: mild tachycardia, regular rhythm, heart sounds normal, intact distal pulses. Negative for: murmur, rub, gallop.  Pulmonary/Chest Wall: Effort normal, breath sounds normal. Negative for: respiratory distress, wheezes, rales, rhonchi.   Musculoskeletal: Normal range of motion. Negative for edema.  Neurological: Alert. Cannot cooperate with exam  Skin: Warm, dry. Negative for rash.  Psych: non verbal, groaning intermittently, agitated, flailing her body around the gurney but is redirectable, cannot cooperate with exam       DIAGNOSTIC STUDIES / PROCEDURES    LABS  Results for orders placed or performed during the hospital encounter of 04/02/17   CBC WITH DIFFERENTIAL   Result Value Ref Range    WBC 5.3 4.8 - 10.8 K/uL    RBC 4.58 4.20 - 5.40 M/uL    Hemoglobin 12.4 12.0 - 16.0 g/dL    Hematocrit 38.6 37.0 - 47.0 %    MCV 84.3 81.4 - 97.8 fL    MCH 27.1 27.0 - 33.0 pg    MCHC 32.1 (L) 33.6 - 35.0 g/dL    RDW 40.2 35.9 - 50.0 fL    Platelet Count 212 164 - 446 K/uL    MPV 10.3 9.0 - 12.9 fL    Nucleated RBC 0.00 /100 WBC    NRBC (Absolute) 0.00 K/uL    Neutrophils-Polys 58.30 44.00 - 72.00 %    Lymphocytes 39.10 22.00 - 41.00 %    Monocytes 1.70 0.00 - 13.40 %    Eosinophils 0.00 0.00 - 6.90 %    Basophils 0.00 0.00 - 1.80 %    Neutrophils (Absolute) 3.14 2.00 - 7.15 K/uL    Lymphs (Absolute) 2.07 1.00 - 4.80 K/uL    Monos (Absolute) 0.09 0.00 - 0.85 " K/uL    Eos (Absolute) 0.00 0.00 - 0.51 K/uL    Baso (Absolute) 0.00 0.00 - 0.12 K/uL   COMP METABOLIC PANEL   Result Value Ref Range    Sodium 140 135 - 145 mmol/L    Potassium 3.5 (L) 3.6 - 5.5 mmol/L    Chloride 106 96 - 112 mmol/L    Co2 27 20 - 33 mmol/L    Anion Gap 7.0 0.0 - 11.9    Glucose 87 65 - 99 mg/dL    Bun 18 8 - 22 mg/dL    Creatinine 0.89 0.50 - 1.40 mg/dL    Calcium 9.7 8.5 - 10.5 mg/dL    AST(SGOT) 30 12 - 45 U/L    ALT(SGPT) 65 (H) 2 - 50 U/L    Alkaline Phosphatase 49 30 - 99 U/L    Total Bilirubin 0.3 0.1 - 1.5 mg/dL    Albumin 4.7 3.2 - 4.9 g/dL    Total Protein 8.0 6.0 - 8.2 g/dL    Globulin 3.3 1.9 - 3.5 g/dL    A-G Ratio 1.4 g/dL   ACETAMINOPHEN   Result Value Ref Range    Acetomenophen -Tylenol <10 10 - 30 ug/mL   DIAGNOSTIC ALCOHOL   Result Value Ref Range    Diagnostic Alcohol 0.00 0.00 g/dL   HCG QUAL SERUM   Result Value Ref Range    Beta-Hcg Qualitative Serum Negative Negative   DIFFERENTIAL MANUAL   Result Value Ref Range    Bands-Stabs 0.90 0.00 - 10.00 %    Manual Diff Status PERFORMED    PERIPHERAL SMEAR REVIEW   Result Value Ref Range    Peripheral Smear Review see below    PLATELET ESTIMATE   Result Value Ref Range    Plt Estimation Normal    MORPHOLOGY   Result Value Ref Range    RBC Morphology Present     Poikilocytosis 1+     Ovalocytes 1+     Reactive Lymphocytes Few    ESTIMATED GFR   Result Value Ref Range    GFR If African American >60 >60 mL/min/1.73 m 2    GFR If Non African American >60 >60 mL/min/1.73 m 2       All labs reviewed by me..    COURSE & MEDICAL DECISION MAKING  Nursing notes, VS, PMSFHx reviewed in chart.    Reviewed the patient's admission from last month which showed the following:  Cranial Imaging: reviewed  CT scan   1.  No acute abnormality.  2.  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  3.  There has been no significant interval change.    ASSESSMENT: (new dx, acuity level)  1. substance induced (marijuana) psychosis  2. R/O bipolar  2 disorder  3. Depression, R/O MDD  4. Seizure disorder possibly 2/2 chronic bilateral temporoparietal infarctions    PLAN:  1. Start on Trileptal 150mg PO BID- tolerated medication and was watched for several hours after dose. Stable, not meeting criteria for hold, discharged.    2. Recommend establish with [facility] for outpatient f/u for workup of etiology of the infarcts  3. Recommend follow up with [facility] for DBT  4. Recommend patient follow up with medicaid application/approval    8:11 PM Patient seen and examined at bedside. The patient presents with altered mental status and agitation and the differential diagnosis includes but is not limited to psychosis, drug abuse. Ordered CBC, CMP, Acetaminophen for reported abuse of Advil PM. Patient will be treated with 5mg IV Haldol and 1mg IV Ativan for her symptoms. I informed the sister that I would order medications to try and calm the patient and work on getting the psychiatric help she needs.      9:09 PM legal 2000 has been held out. [facility] is aware of the patient and knows her well. She will need transfer to an inpatient psychiatric facility.    DISPOSITION:   Transfer to a psychiatric facility as soon as a bed is available. Patient will be observed in the emergency department by the next ER P on schedule until transfer      FINAL IMPRESSION  1. Psychosis, unspecified psychosis type          I, Lyudmila Be (Vern), am scribing for, and in the presence of, Susie Bowles M.D..    Electronically signed by: Lyudmila Be (Vern), 4/2/2017    ISusie M.D. personally performed the services described in this documentation, as scribed by Lyudmila Be in my presence, and it is both accurate and complete.    The note accurately reflects work and decisions made by me.  Susie Bowles  4/2/2017  8:31 PM

## 2017-04-03 NOTE — ED NOTES
Pt moved to green 30.  Report given to WHIT Carreon.  One bag of belongings marked and placed in designated secure area.

## 2017-04-03 NOTE — ED NOTES
Pt has been evaluated by Dr Padilla.  New med orders rec'd.  Pt allowed to make a phone call to family at this time.

## 2017-04-03 NOTE — ED NOTES
2135  Pt calm and resting in gurney with eyes closed.  No distress noted.  Will continue to monitor.

## 2017-04-04 VITALS
TEMPERATURE: 98.2 F | OXYGEN SATURATION: 97 % | WEIGHT: 115 LBS | DIASTOLIC BLOOD PRESSURE: 62 MMHG | SYSTOLIC BLOOD PRESSURE: 101 MMHG | HEART RATE: 77 BPM | RESPIRATION RATE: 16 BRPM | BODY MASS INDEX: 19.63 KG/M2 | HEIGHT: 64 IN

## 2017-04-04 PROCEDURE — A9270 NON-COVERED ITEM OR SERVICE: HCPCS | Performed by: EMERGENCY MEDICINE

## 2017-04-04 PROCEDURE — 700102 HCHG RX REV CODE 250 W/ 637 OVERRIDE(OP): Performed by: EMERGENCY MEDICINE

## 2017-04-04 PROCEDURE — 700102 HCHG RX REV CODE 250 W/ 637 OVERRIDE(OP): Performed by: PSYCHIATRY & NEUROLOGY

## 2017-04-04 PROCEDURE — A9270 NON-COVERED ITEM OR SERVICE: HCPCS | Performed by: PSYCHIATRY & NEUROLOGY

## 2017-04-04 RX ADMIN — OXCARBAZEPINE 150 MG: 150 TABLET, FILM COATED ORAL at 09:41

## 2017-04-04 RX ADMIN — ARIPIPRAZOLE 5 MG: 10 TABLET ORAL at 08:42

## 2017-04-04 RX ADMIN — NITROFURANTOIN MONOHYDRATE AND NITROFURANTOIN MACROCRYSTALLINE 100 MG: 75; 25 CAPSULE ORAL at 08:42

## 2017-04-04 ASSESSMENT — PAIN SCALES - GENERAL: PAINLEVEL_OUTOF10: 0

## 2017-04-04 NOTE — CONSULTS
"Alert team night shift rounds:  Patient's presentation has improved today.  She reports she is no longer seeing her girlfriend.  UDS and BA were both negative so the root cause of her psychosis yesterday not clear.  She reports she had not been sleeping and eating.  \"I was not sleeping even when I took 5 or 6 sleeping pills (Advil PM.)\"  \"I didn't eat there (not clear where there is.)\"  \"I stopped drinking too (when asked about drugs.)\"  She would like a more comfortable bed asked UC and RN to get one delivered to her room.   "

## 2017-04-04 NOTE — DISCHARGE PLANNING
Medical Social Work    Referral: Legal hold Transfer to Mental Health Facility    Intervention: JOSE received call from Dominique at [facility] stating that Dr. Vernon has accepted the patient for admission.     JOSE arranged for transportation to be set up through [facility]    Pt has no transport benefit.     The pt will be picked up at 1415.     JOSE notified the RN of the departure time as well as accepting facility.     JOSE created transfer packet and placed on chart. Original Legal Hold placed in packet.     Plan: Pt will transfer to [facility] at 1415.

## 2017-04-04 NOTE — ED NOTES
Pt. transported to [facility] Bed 4 by [facility] and  tech. Vitals taken by CNA. Pt. In bed resting comfortably with no new concerns at this time.

## 2017-04-04 NOTE — ED NOTES
Pt to [facility] with [facility].  Pt belongings and packet with original legal hold provided to medic and prescription x1.

## 2017-04-04 NOTE — DISCHARGE PLANNING
Per Kaitlin(NAM), [facility] has accepted patient and has a bed available. Dolly(JOSE) notified via phone.

## 2017-04-05 LAB
BACTERIA UR CULT: NORMAL
SIGNIFICANT IND 70042: NORMAL
SITE SITE: NORMAL
SOURCE SOURCE: NORMAL
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
## 2017-04-05 NOTE — PSYCHIATRY
"PSYCHIATRIC FOLLOW UP:    Reason for Admission: psychosis  Legal hold status:   extended  Psychiatric Supervising Attending:         HPI:     Nunu came in yesterday with psychosis and og. Doing better today. Still not completely euthymic. Mood still mildly elevated. Psychosis appears to be resolving. No s/e to medication at this time. She is not suicidal. She is still not back to baseline it appears. Continues to have some clear grandiosity.       Psychiatric Examination: observed phenomenon:  Vitals:  Filed Vitals:    04/03/17 2358 04/04/17 0406 04/04/17 0750 04/04/17 1213   BP: 116/77 124/88 100/65 101/62   Pulse: 92 82 85 77   Temp: 36.8 °C (98.2 °F) 36.9 °C (98.4 °F) 36.7 °C (98.1 °F) 36.8 °C (98.2 °F)   TempSrc: Temporal  Temporal Temporal   Resp: 15 17 16 16   Height:       Weight:       SpO2: 97% 95% 96% 97%       Musculoskeletal(abnormal movements, gait, etc):+psychomotor retardation   Appearance:grooming poor  Thoughts: more logical   Speech: more fluent   Mood:    \"okay\"  Affect:    More normalized  SI/HI:   denies  Attention/Alertness:   Awake, alert   Memory:    wnl  Orientation:    wnl  Fund of Knowledge:    Grossly intact  Insight/Judgement into psychiatric symptoms fair   Neurological Testing:( ie clock, cube drawing, MMSE, MOCA,etc.)    Medical systems reviewed: (pain, new complaint, etc)   Denies medical symptoms   All other systems reviewed and are negative.       Lab results/tests:   No new labs        Assessment:(acuity level)  BAD,current episode manic with psychotic features, improving  Plan:  Continue hold  Continue abilify and trileptal   Will follow   Transfer to psych when bed available.           "

## 2017-04-22 ENCOUNTER — HOSPITAL ENCOUNTER (EMERGENCY)
Facility: MEDICAL CENTER | Age: 22
End: 2017-04-22
Payer: MEDICAID

## 2017-04-22 VITALS
SYSTOLIC BLOOD PRESSURE: 118 MMHG | TEMPERATURE: 98 F | BODY MASS INDEX: 18.74 KG/M2 | HEIGHT: 64 IN | RESPIRATION RATE: 17 BRPM | WEIGHT: 109.79 LBS | OXYGEN SATURATION: 98 % | DIASTOLIC BLOOD PRESSURE: 79 MMHG | HEART RATE: 96 BPM

## 2017-04-22 PROCEDURE — 302449 STATCHG TRIAGE ONLY (STATISTIC)

## 2017-04-22 ASSESSMENT — PAIN SCALES - GENERAL: PAINLEVEL_OUTOF10: 0

## 2017-04-26 ENCOUNTER — APPOINTMENT (OUTPATIENT)
Dept: RADIOLOGY | Facility: MEDICAL CENTER | Age: 22
End: 2017-04-26
Attending: EMERGENCY MEDICINE
Payer: MEDICAID

## 2017-04-26 ENCOUNTER — HOSPITAL ENCOUNTER (EMERGENCY)
Facility: MEDICAL CENTER | Age: 22
End: 2017-04-26
Attending: EMERGENCY MEDICINE
Payer: MEDICAID

## 2017-04-26 VITALS
SYSTOLIC BLOOD PRESSURE: 124 MMHG | HEIGHT: 64 IN | HEART RATE: 100 BPM | TEMPERATURE: 97.9 F | BODY MASS INDEX: 18.78 KG/M2 | OXYGEN SATURATION: 98 % | DIASTOLIC BLOOD PRESSURE: 82 MMHG | WEIGHT: 110 LBS | RESPIRATION RATE: 16 BRPM

## 2017-04-26 DIAGNOSIS — R56.9 SEIZURE-LIKE ACTIVITY (HCC): ICD-10-CM

## 2017-04-26 LAB
ALBUMIN SERPL BCP-MCNC: 4.8 G/DL (ref 3.2–4.9)
ALBUMIN/GLOB SERPL: 1.6 G/DL
ALP SERPL-CCNC: 51 U/L (ref 30–99)
ALT SERPL-CCNC: 10 U/L (ref 2–50)
AMPHET UR QL SCN: NEGATIVE
ANION GAP SERPL CALC-SCNC: 8 MMOL/L (ref 0–11.9)
APPEARANCE UR: CLEAR
AST SERPL-CCNC: 12 U/L (ref 12–45)
BACTERIA #/AREA URNS HPF: ABNORMAL /HPF
BARBITURATES UR QL SCN: NEGATIVE
BASOPHILS # BLD AUTO: 0.2 % (ref 0–1.8)
BASOPHILS # BLD: 0.01 K/UL (ref 0–0.12)
BENZODIAZ UR QL SCN: NEGATIVE
BILIRUB SERPL-MCNC: 0.2 MG/DL (ref 0.1–1.5)
BILIRUB UR QL STRIP.AUTO: NEGATIVE
BUN SERPL-MCNC: 16 MG/DL (ref 8–22)
BZE UR QL SCN: NEGATIVE
CALCIUM SERPL-MCNC: 9.4 MG/DL (ref 8.5–10.5)
CANNABINOIDS UR QL SCN: NEGATIVE
CHLORIDE SERPL-SCNC: 107 MMOL/L (ref 96–112)
CO2 SERPL-SCNC: 26 MMOL/L (ref 20–33)
COLOR UR: YELLOW
CREAT SERPL-MCNC: 0.92 MG/DL (ref 0.5–1.4)
EKG IMPRESSION: NORMAL
EOSINOPHIL # BLD AUTO: 0.03 K/UL (ref 0–0.51)
EOSINOPHIL NFR BLD: 0.6 % (ref 0–6.9)
EPI CELLS #/AREA URNS HPF: ABNORMAL /HPF
ERYTHROCYTE [DISTWIDTH] IN BLOOD BY AUTOMATED COUNT: 40.9 FL (ref 35.9–50)
GFR SERPL CREATININE-BSD FRML MDRD: >60 ML/MIN/1.73 M 2
GLOBULIN SER CALC-MCNC: 3 G/DL (ref 1.9–3.5)
GLUCOSE SERPL-MCNC: 86 MG/DL (ref 65–99)
GLUCOSE UR STRIP.AUTO-MCNC: NEGATIVE MG/DL
HCG SERPL QL: NEGATIVE
HCT VFR BLD AUTO: 37.8 % (ref 37–47)
HGB BLD-MCNC: 12 G/DL (ref 12–16)
IMM GRANULOCYTES # BLD AUTO: 0.02 K/UL (ref 0–0.11)
IMM GRANULOCYTES NFR BLD AUTO: 0.4 % (ref 0–0.9)
KETONES UR STRIP.AUTO-MCNC: ABNORMAL MG/DL
LEUKOCYTE ESTERASE UR QL STRIP.AUTO: ABNORMAL
LYMPHOCYTES # BLD AUTO: 1.61 K/UL (ref 1–4.8)
LYMPHOCYTES NFR BLD: 32.6 % (ref 22–41)
MAGNESIUM SERPL-MCNC: 1.9 MG/DL (ref 1.5–2.5)
MCH RBC QN AUTO: 27 PG (ref 27–33)
MCHC RBC AUTO-ENTMCNC: 31.7 G/DL (ref 33.6–35)
MCV RBC AUTO: 84.9 FL (ref 81.4–97.8)
MDMA UR QL SCN: NEGATIVE
METHADONE UR QL SCN: NEGATIVE
MICRO URNS: ABNORMAL
MONOCYTES # BLD AUTO: 0.25 K/UL (ref 0–0.85)
MONOCYTES NFR BLD AUTO: 5.1 % (ref 0–13.4)
MUCOUS THREADS #/AREA URNS HPF: ABNORMAL /HPF
NEUTROPHILS # BLD AUTO: 3.02 K/UL (ref 2–7.15)
NEUTROPHILS NFR BLD: 61.1 % (ref 44–72)
NITRITE UR QL STRIP.AUTO: NEGATIVE
NRBC # BLD AUTO: 0 K/UL
NRBC BLD AUTO-RTO: 0 /100 WBC
OPIATES UR QL SCN: NEGATIVE
OXYCODONE UR QL SCN: NEGATIVE
PCP UR QL SCN: NEGATIVE
PH UR STRIP.AUTO: 6 [PH]
PHOSPHATE SERPL-MCNC: 2.7 MG/DL (ref 2.5–4.5)
PLATELET # BLD AUTO: 189 K/UL (ref 164–446)
PMV BLD AUTO: 10 FL (ref 9–12.9)
POTASSIUM SERPL-SCNC: 3.9 MMOL/L (ref 3.6–5.5)
PROPOXYPH UR QL SCN: NEGATIVE
PROT SERPL-MCNC: 7.8 G/DL (ref 6–8.2)
PROT UR QL STRIP: NEGATIVE MG/DL
RBC # BLD AUTO: 4.45 M/UL (ref 4.2–5.4)
RBC # URNS HPF: ABNORMAL /HPF
RBC UR QL AUTO: NEGATIVE
SODIUM SERPL-SCNC: 141 MMOL/L (ref 135–145)
SP GR UR STRIP.AUTO: 1.03
WBC # BLD AUTO: 4.9 K/UL (ref 4.8–10.8)
WBC #/AREA URNS HPF: ABNORMAL /HPF

## 2017-04-26 PROCEDURE — 70450 CT HEAD/BRAIN W/O DYE: CPT

## 2017-04-26 PROCEDURE — 84100 ASSAY OF PHOSPHORUS: CPT

## 2017-04-26 PROCEDURE — 93005 ELECTROCARDIOGRAM TRACING: CPT

## 2017-04-26 PROCEDURE — 84703 CHORIONIC GONADOTROPIN ASSAY: CPT

## 2017-04-26 PROCEDURE — 81001 URINALYSIS AUTO W/SCOPE: CPT

## 2017-04-26 PROCEDURE — 36415 COLL VENOUS BLD VENIPUNCTURE: CPT

## 2017-04-26 PROCEDURE — 83735 ASSAY OF MAGNESIUM: CPT

## 2017-04-26 PROCEDURE — 87086 URINE CULTURE/COLONY COUNT: CPT

## 2017-04-26 PROCEDURE — 99284 EMERGENCY DEPT VISIT MOD MDM: CPT

## 2017-04-26 PROCEDURE — 80307 DRUG TEST PRSMV CHEM ANLYZR: CPT

## 2017-04-26 PROCEDURE — 80053 COMPREHEN METABOLIC PANEL: CPT

## 2017-04-26 PROCEDURE — 85025 COMPLETE CBC W/AUTO DIFF WBC: CPT

## 2017-04-26 ASSESSMENT — LIFESTYLE VARIABLES: DO YOU DRINK ALCOHOL: NO

## 2017-04-26 NOTE — ED AVS SNAPSHOT
Home Care Instructions                                                                                                                Nunu Friedman   MRN: [id number]    Department:  [facility], Emergency Dept   Date of Visit:  4/26/2017            [facility], Emergency Dept    [address] [address]    Phone:  [phone]      You were seen by     Gracie Marcelo M.D.      Your Diagnosis Was     Seizure-like activity (CMS-HCC)     R56.9       Follow-up Information     1. Follow up with [facility], Emergency Dept.    Specialty:  Emergency Medicine    Why:  As needed, If symptoms worsen    Contact information    [address] [place] [id number] [phone]        2. Follow up with [facility]. Schedule an appointment as soon as possible for a visit in 1 day.    Contact information    [address] [address] [phone]        3. Follow up with [facility]. Schedule an appointment as soon as possible for a visit in 1 day.    Contact information    [address]  [place] [address] [phone]        Medication Information     Review all of your home medications and newly ordered medications with your primary doctor and/or pharmacist as soon as possible. Follow medication instructions as directed by your doctor and/or pharmacist.     Please keep your complete medication list with you and share with your physician. Update the information when medications are discontinued, doses are changed, or new medications (including over-the-counter products) are added; and carry medication information at all times in the event of emergency situations.               Medication List      ASK your doctor about these medications        Instructions    Morning Afternoon Evening Bedtime    nitrofurantoin monohydr macro 100 MG Caps   Commonly known as:  MACROBID        Take 1 Cap by mouth 2 times a day.   Dose:  100 mg                                Procedures and tests performed during your visit     BETA-HCG QUALITATIVE SERUM    CBC WITH DIFFERENTIAL    CMP    CT-HEAD W/O    EKG (NOW)    ESTIMATED GFR    MAGNESIUM    OLD EKG    PHOSPHORUS    SALINE LOCK    URINALYSIS    URINE CULTURE(NEW)    URINE DRUG SCREEN    URINE MICROSCOPIC (W/UA)        Discharge Instructions       Increase Trileptal to 300mg twice daily  Follow up with neurology clinic for seizure work up  Return to ER for worsening symptoms, recurrent seizure or other concerns  Establish care with a primary care doctor      Seizure, Adult  A seizure is abnormal electrical activity in the brain. Seizures usually last from 30 seconds to 2 minutes. There are various types of seizures.  Before a seizure, you may have a warning sensation (aura) that a seizure is about to occur. An aura may include the following symptoms:   · Fear or anxiety.  · Nausea.  · Feeling like the room is spinning (vertigo).  · Vision changes, such as seeing flashing lights or spots.  Common symptoms during a seizure include:  · A change in attention or behavior (altered mental status).  · Convulsions with rhythmic jerking movements.  · Drooling.  · Rapid eye movements.  · Grunting.  · Loss of bladder and bowel control.  · Bitter taste in the mouth.  · Tongue biting.  After a seizure, you may feel confused and sleepy. You may also have an injury resulting from convulsions during the seizure.  HOME CARE INSTRUCTIONS   · If you are given medicines, take them exactly as prescribed by your health care provider.  · Keep all follow-up appointments as directed by your health care provider.  · Do not swim or drive or engage in risky activity during which a seizure could cause further injury to you or others until your health care provider says it is OK.  · Get adequate rest.  · Teach friends and family what to do if you have a seizure. They should:  ¨ Lay you on the ground to prevent a fall.  ¨ Put a cushion  under your head.  ¨ Loosen any tight clothing around your neck.  ¨ Turn you on your side. If vomiting occurs, this helps keep your airway clear.  ¨ Stay with you until you recover.  ¨ Know whether or not you need emergency care.  SEEK IMMEDIATE MEDICAL CARE IF:  · The seizure lasts longer than 5 minutes.  · The seizure is severe or you do not wake up immediately after the seizure.  · You have an altered mental status after the seizure.  · You are having more frequent or worsening seizures.  Someone should drive you to the emergency department or call local emergency services (911 in [place]  MAKE SURE YOU:  · Understand these instructions.  · Will watch your condition.  · Will get help right away if you are not doing well or get worse.     This information is not intended to replace advice given to you by your health care provider. Make sure you discuss any questions you have with your health care provider.     Document Released: 12/15/2001 Document Revised: 01/08/2016 Document Reviewed: 07/30/2014  [employer] Patient Education ©2016 [employer]            Patient Information     Patient Information    Following emergency treatment: all patient requiring follow-up care must return either to a private physician or a clinic if your condition worsens before you are able to obtain further medical attention, please return to the emergency room.     Billing Information    At [facility], we work to make the billing process streamlined for our patients.  Our Representatives are here to answer any questions you may have regarding your hospital bill.  If you have insurance coverage and have supplied your insurance information to us, we will submit a claim to your insurer on your behalf.  Should you have any questions regarding your bill, we can be reached online or by phone as follows:  Online: You are able pay your bills online or live chat with our representatives about any billing questions you may have. We  are here to help Monday - Friday from 8:00am to 7:30pm and 9:00am - 12:00pm on Saturdays.  Please visit [email or web address]  for more information.   Phone:  [phone] or [phone]    Please note that your emergency physician, surgeon, pathologist, radiologist, anesthesiologist, and other specialists are not employed by [facility] and will therefore bill separately for their services.  Please contact them directly for any questions concerning their bills at the numbers below:     Emergency Physician Services:  [phone]  [facility]:  [phone]  [facility]:  [phone]  [facility]:  [phone]    1. Your final bill may vary from the amount quoted upon discharge if all procedures are not complete at that time, or if your doctor has additional procedures of which we are not aware. You will receive an additional bill if you return to the Emergency Department at [facility] for suture removal regardless of the facility of which the sutures were placed.     2. Please arrange for settlement of this account at the emergency registration.    3. All self-pay accounts are due in full at the time of treatment.  If you are unable to meet this obligation then payment is expected within 4-5 days.     4. If you have had radiology studies (CT, X-ray, Ultrasound, MRI), you have received a preliminary result during your emergency department visit. Please contact the radiology department [phone] to receive a copy of your final result. Please discuss the Final result with your primary physician or with the follow up physician provided.     Crisis Hotline:  [place] Crisis Hotline:  6-033-BRDFCUK or 1-510.955.4071  [place] Crisis Hotline:    1-875.370.4407 or 490-410-1094         ED Discharge Follow Up Questions    1. In order to provide you with very good care, we would like to follow up with a phone call in the next few days.  May  we have your permission to contact you?     YES /  NO    2. What is the best phone number to call you? (       )_____-__________    3. What is the best time to call you?      Morning  /  Afternoon  /  Evening                   Patient Signature:  ____________________________________________________________    Date:  ____________________________________________________________

## 2017-04-26 NOTE — ED NOTES
Chief Complaint   Patient presents with   • Seizure     bib AMB. [place] and had seizure like activity x2. (-) postictle and no hx of seizures.

## 2017-04-26 NOTE — ED PROVIDER NOTES
"ED Provider Note    CHIEF COMPLAINT  Chief Complaint   Patient presents with   • Seizure     bib AMB. [place] and had seizure like activity x2. (-) postictle and no hx of seizures.       HPI  Nunu Friedman is a 21 y.o. female with a history of cerebral palsy, anxiety, and MS who presents via EMS following seizure like activity ×2 today.    Patient states she was walking and then awoke with people around her. She has had seizures as a child but not since. She states she was recently told she has multiple sclerosis by the staff at [facility]. She has not established care with a neurologist as of yet.    ALLERGIES  No Known Allergies    CURRENT MEDICATIONS  Trileptal    PAST MEDICAL HISTORY   has a past medical history of Cerebral palsy (CMS-HCC); Anxiety; Psychiatric disorder; and MS (multiple sclerosis) (CMS-HCC).    SURGICAL HISTORY  patient denies any surgical history    SOCIAL HISTORY  Social History     Social History Main Topics   • Smoking status: Current Every Day Smoker -- 0.25 packs/day     Types: Cigarettes   • Smokeless tobacco: Not on file   • Alcohol Use: Yes      Comment: socially   • Drug Use: No   • Sexual Activity: Not on file       Family Hx:  denies      REVIEW OF SYSTEMS  See HPI for further details.  All other systems are negative except as above in HPI.    PHYSICAL EXAM  VITAL SIGNS: /82 mmHg  Pulse 96  Temp(Src) 36.6 °C (97.9 °F)  Resp 16  Ht 1.626 m (5' 4\")  Wt 49.896 kg (110 lb)  BMI 18.87 kg/m2  SpO2 100%    General:  WDWN, nontoxic appearing in NAD; A+Ox3; V/S as above; afebrile, tachy at triage  Skin: warm and dry; good color; no rash  HEENT: NCAT; EOMs intact; PERRL; no scleral icterus   Neck: FROM; no meningismus, no LAD  Cardiovascular: Regular heart rate and rhythm.  No murmurs, rubs, or gallops; pulses 2+ bilaterally radially and DP areas  Lungs: Clear to auscultation with good air movement bilaterally.  No wheezes, " rhonchi, or rales.   Abdomen: BS present; soft; NTND; no rebound, guarding, or rigidity.  No organomegaly or pulsatile mass; no CVAT   Extremities: MCGRATH x 4; no e/o trauma; no pedal edema  Neurologic: CNs III-XII grossly intact; speech clear; distal sensation intact; strength 5/5 UE/LEs  Psychiatric: Appropriate affect, normal mood    LABS  Results for orders placed or performed during the hospital encounter of 04/26/17   CBC WITH DIFFERENTIAL   Result Value Ref Range    WBC 4.9 4.8 - 10.8 K/uL    RBC 4.45 4.20 - 5.40 M/uL    Hemoglobin 12.0 12.0 - 16.0 g/dL    Hematocrit 37.8 37.0 - 47.0 %    MCV 84.9 81.4 - 97.8 fL    MCH 27.0 27.0 - 33.0 pg    MCHC 31.7 (L) 33.6 - 35.0 g/dL    RDW 40.9 35.9 - 50.0 fL    Platelet Count 189 164 - 446 K/uL    MPV 10.0 9.0 - 12.9 fL    Neutrophils-Polys 61.10 44.00 - 72.00 %    Lymphocytes 32.60 22.00 - 41.00 %    Monocytes 5.10 0.00 - 13.40 %    Eosinophils 0.60 0.00 - 6.90 %    Basophils 0.20 0.00 - 1.80 %    Immature Granulocytes 0.40 0.00 - 0.90 %    Nucleated RBC 0.00 /100 WBC    Neutrophils (Absolute) 3.02 2.00 - 7.15 K/uL    Lymphs (Absolute) 1.61 1.00 - 4.80 K/uL    Monos (Absolute) 0.25 0.00 - 0.85 K/uL    Eos (Absolute) 0.03 0.00 - 0.51 K/uL    Baso (Absolute) 0.01 0.00 - 0.12 K/uL    Immature Granulocytes (abs) 0.02 0.00 - 0.11 K/uL    NRBC (Absolute) 0.00 K/uL   CMP   Result Value Ref Range    Sodium 141 135 - 145 mmol/L    Potassium 3.9 3.6 - 5.5 mmol/L    Chloride 107 96 - 112 mmol/L    Co2 26 20 - 33 mmol/L    Anion Gap 8.0 0.0 - 11.9    Glucose 86 65 - 99 mg/dL    Bun 16 8 - 22 mg/dL    Creatinine 0.92 0.50 - 1.40 mg/dL    Calcium 9.4 8.5 - 10.5 mg/dL    AST(SGOT) 12 12 - 45 U/L    ALT(SGPT) 10 2 - 50 U/L    Alkaline Phosphatase 51 30 - 99 U/L    Total Bilirubin 0.2 0.1 - 1.5 mg/dL    Albumin 4.8 3.2 - 4.9 g/dL    Total Protein 7.8 6.0 - 8.2 g/dL    Globulin 3.0 1.9 - 3.5 g/dL    A-G Ratio 1.6 g/dL   MAGNESIUM   Result Value Ref Range    Magnesium 1.9 1.5 - 2.5 mg/dL    PHOSPHORUS   Result Value Ref Range    Phosphorus 2.7 2.5 - 4.5 mg/dL   BETA-HCG QUALITATIVE SERUM   Result Value Ref Range    Beta-Hcg Qualitative Serum Negative Negative   URINE DRUG SCREEN   Result Value Ref Range    Amphetamines Urine Negative Negative    Barbiturates Negative Negative    Benzodiazepines Negative Negative    Cocaine Metabolite Negative Negative    Methadone Negative Negative    Ecstasy Negative Negative    Opiates Negative Negative    Oxycodone Negative Negative    Phencyclidine -Pcp Negative Negative    Propoxyphene Negative Negative    Cannabinoid Metab Negative Negative   URINALYSIS   Result Value Ref Range    Micro Urine Req Microscopic     Color Yellow     Character Clear     Specific Gravity 1.029 <1.035    Ph 6.0 5.0-8.0    Glucose Negative Negative mg/dL    Ketones Trace (A) Negative mg/dL    Protein Negative Negative mg/dL    Bilirubin Negative Negative    Nitrite Negative Negative    Leukocyte Esterase Large (A) Negative    Occult Blood Negative Negative   ESTIMATED GFR   Result Value Ref Range    GFR If African American >60 >60 mL/min/1.73 m 2    GFR If Non African American >60 >60 mL/min/1.73 m 2   URINE MICROSCOPIC (W/UA)   Result Value Ref Range    WBC 5-10 (A) /hpf    RBC 5-10 (A) /hpf    Bacteria Few (A) None /hpf    Epithelial Cells Few /hpf    Mucous Threads Few /hpf   EKG (NOW)   Result Value Ref Range    Report       [facility] Emergency Dept[facility]    Test Date:  2017  Pt Name:    DAYNE FISH                Department: ER  MRN:        [id number]                      Room:        19  Gender:     F                            Technician: [id number]  :        1995                   Requested By:ER TRIAGE PROTOCOL  Order #:    [id number]                    Reading MD:    Measurements  Intervals                                Axis  Rate:       102                          P:          56  KY:         156                          QRS:        58  QRSD:        70                           T:          36  QT:         328  QTc:        428    Interpretive Statements  SINUS TACHYCARDIA  Compared to ECG 10/10/2014 19:58:17  No significant changes       IMAGING  CT-HEAD W/O   Final Result      1.  There is no change in chronic infarcts and encephalomalacia in the bilateral temporoparietal regions.   2.  There is no acute intracranial process.        MEDICAL RECORD  I have reviewed patient's medical record and pertinent results are listed below.    Review of pt's prior admission for bizarre behavior shows a note by psych   Impression          1.  Evidence of prior bilateral posterior temporoparietal lobe ischemic insults. Prior imaging would be helpful to evaluate for interval change.  2.  No definite CT evidence of acute territorial infarct.  If there is strong clinical suspicion for stroke, MRI should be obtained for further evaluation.                      ASSESSMENT: (new dx, acuity level)  Bipolar disorder, current epiosode manic  Old cerebral ischemia, likely requires MRI and further workup as outpatient     PLAN:  Hold extended  Restarting trileptal 150mg po bid  Starting abilify 5mg po daily    Will titrate up as tolerated.    Will follow.                  COURSE & MEDICAL DECISION MAKING  I have reviewed any medical record information, laboratory studies and radiographic results as noted.    Nunu Friedman is a 21 y.o. female who presents for evaluation of seizure activity. Patient has no evidence of head trauma, tongue lacerations, or incontinence. She is a history of seizures as a child. Patient is on Trileptal for mood stabilization.    Bloodwork demonstrates no significant abnormalities.    UA positive for leukocyte esterase but pt w/o sxs and no sig pyuria noted.  Will hold off on tx until urine culture resulted.    CT head negative for acute pathology but demonstrates stable encephalomalacia and chronic infarcts.    Pt was re-evaluated at 17:50  Pt reports she  was told she has MS by [facility] just last week and has not had neurology f/u yet    6:03 PM  Paging neurology    6:13 PM  Discussed with Dr. Locke who recommends increasing Trileptal to 300mg bid along with outpatient follow up in neurology clinic for seizure work up and possible MS.  Pt was advised of this plan.    Pt's blood pressure was noted to be above 120/80 here in the ER.  Pt was informed and advised to follow up as an outpatient for recheck for possible dx/management of hypertension.      FINAL IMPRESSION  1. Seizure-like activity (CMS-HCC)        Electronically signed by: Gracie Marcelo, 4/26/2017 4:07 PM

## 2017-04-26 NOTE — ED AVS SNAPSHOT
Socialite Access Code: [email or web address]  Expires: 5/26/2017  6:16 PM    Your email address is not on file at [facility].  Email Addresses are required for you to sign up for Socialite, please contact [phone] to verify your personal information and to provide your email address prior to attempting to register for Socialite.    Nunu JEMMA Friedman  [address] [place] [address]    Socialite  A secure, online tool to manage your health information     [employer] [employer]’s Socialite® is a secure, online tool that connects you to your personalized health information from the privacy of your home -- day or night - making it very easy for you to manage your healthcare. Once the activation process is completed, you can even access your medical information using the Socialite james, which is available for free in the Apple James store or Google Play store.     To learn more about Socialite, visit [email or web address]    There are two levels of access available (as shown below):   My Chart Features  [facility] Primary Care Doctor [facility]  Specialists [facility]  Urgent  Care Non-[facility] Primary Care Doctor   Email your healthcare team securely and privately 24/7 X X X    Manage appointments: schedule your next appointment; view details of past/upcoming appointments X      Request prescription refills. X      View recent personal medical records, including lab and immunizations X X X X   View health record, including health history, allergies, medications X X X X   Read reports about your outpatient visits, procedures, consult and ER notes X X X X   See your discharge summary, which is a recap of your hospital and/or ER visit that includes your diagnosis, lab results, and care plan X X  X     How to register for Watsint:  Once your e-mail address has been verified, follow the following steps to sign up for Socialite.     1. Go to  [email or web address]  2. Click on the Sign Up Now box, which takes you to the New Member Sign Up page. You will  need to provide the following information:  a. Enter your SueEasy Access Code exactly as it appears at the top of this page. (You will not need to use this code after you’ve completed the sign-up process. If you do not sign up before the expiration date, you must request a new code.)   b. Enter your date of birth.   c. Enter your home email address.   d. Click Submit, and follow the next screen’s instructions.  3. Create a [employer]ming[employer] Pharm[employer]st ID. This will be your SueEasy login ID and cannot be changed, so think of one that is secure and easy to remember.  4. Create a SueEasy password. You can change your password at any time.  5. Enter your Password Reset Question and Answer. This can be used at a later time if you forget your password.   6. Enter your e-mail address. This allows you to receive e-mail notifications when new information is available in SueEasy.  7. Click Sign Up. You can now view your health information.    For assistance activating your SueEasy account, call [phone]

## 2017-04-26 NOTE — ED AVS SNAPSHOT
4/26/2017    Nunu Friedman  [address] [place] [address]    Dear Nunu:    [facility] wants to ensure your discharge home is safe and you or your loved ones have had all of your questions answered regarding your care after you leave the hospital.    Below is a list of resources and contact information should you have any questions regarding your hospital stay, follow-up instructions, or active medical symptoms.    Questions or Concerns Regarding… Contact   Medical Questions Related to Your Discharge  (7 days a week, 8am-5pm) Contact a Nurse Care Coordinator   [phone]   Medical Questions Not Related to Your Discharge  (24 hours a day / 7 days a week)  Contact the Nurse Health Line   [phone]    Medications or Discharge Instructions Refer to your discharge packet   or contact your [facility] Primary Care Provider   [phone]   Follow-up Appointment(s) Schedule your appointment via [employer]   or contact Scheduling [phone]   Billing Review your statement via LoraxAg  or contact Billing [phone]   Medical Records Review your records via LoraxAg   or contact Medical Records [phone]     You may receive a telephone call within two days of discharge. This call is to make certain you understand your discharge instructions and have the opportunity to have any questions answered. You can also easily access your medical information, test results and upcoming appointments via the LoraxAg free online health management tool. You can learn more and sign up at [email or web address]. For assistance setting up your LoraxAg account, please call [phone].    Once again, we want to ensure your discharge home is safe and that you have a clear understanding of any next steps in your care. If you have any questions or concerns, please do not hesitate to contact us, we are here for you. Thank you for choosing [facility] for your healthcare needs.    Sincerely,    Your [facility] Healthcare Team

## 2017-04-27 NOTE — DISCHARGE INSTRUCTIONS
Increase Trileptal to 300mg twice daily  Follow up with neurology clinic for seizure work up  Return to ER for worsening symptoms, recurrent seizure or other concerns  Establish care with a primary care doctor      Seizure, Adult  A seizure is abnormal electrical activity in the brain. Seizures usually last from 30 seconds to 2 minutes. There are various types of seizures.  Before a seizure, you may have a warning sensation (aura) that a seizure is about to occur. An aura may include the following symptoms:   · Fear or anxiety.  · Nausea.  · Feeling like the room is spinning (vertigo).  · Vision changes, such as seeing flashing lights or spots.  Common symptoms during a seizure include:  · A change in attention or behavior (altered mental status).  · Convulsions with rhythmic jerking movements.  · Drooling.  · Rapid eye movements.  · Grunting.  · Loss of bladder and bowel control.  · Bitter taste in the mouth.  · Tongue biting.  After a seizure, you may feel confused and sleepy. You may also have an injury resulting from convulsions during the seizure.  HOME CARE INSTRUCTIONS   · If you are given medicines, take them exactly as prescribed by your health care provider.  · Keep all follow-up appointments as directed by your health care provider.  · Do not swim or drive or engage in risky activity during which a seizure could cause further injury to you or others until your health care provider says it is OK.  · Get adequate rest.  · Teach friends and family what to do if you have a seizure. They should:  ¨ Lay you on the ground to prevent a fall.  ¨ Put a cushion under your head.  ¨ Loosen any tight clothing around your neck.  ¨ Turn you on your side. If vomiting occurs, this helps keep your airway clear.  ¨ Stay with you until you recover.  ¨ Know whether or not you need emergency care.  SEEK IMMEDIATE MEDICAL CARE IF:  · The seizure lasts longer than 5 minutes.  · The seizure is severe or you do not wake up  immediately after the seizure.  · You have an altered mental status after the seizure.  · You are having more frequent or worsening seizures.  Someone should drive you to the emergency department or call local emergency services (911 in [place]  MAKE SURE YOU:  · Understand these instructions.  · Will watch your condition.  · Will get help right away if you are not doing well or get worse.     This information is not intended to replace advice given to you by your health care provider. Make sure you discuss any questions you have with your health care provider.     Document Released: 12/15/2001 Document Revised: 01/08/2016 Document Reviewed: 07/30/2014  [employer] Interactive Patient Education ©2016 Elsevier Inc.

## 2017-04-27 NOTE — ED NOTES
Pt ambulated from ED w/o complications. Called mother to come pick her up. Verbalized understanding that there are no new scripts to take.

## 2017-04-28 LAB
BACTERIA UR CULT: NORMAL
SIGNIFICANT IND 70042: NORMAL
SITE SITE: NORMAL
SOURCE SOURCE: NORMAL

## 2017-05-31 ENCOUNTER — HOSPITAL ENCOUNTER (EMERGENCY)
Facility: MEDICAL CENTER | Age: 22
End: 2017-05-31
Attending: EMERGENCY MEDICINE
Payer: MEDICAID

## 2017-05-31 VITALS
DIASTOLIC BLOOD PRESSURE: 63 MMHG | HEIGHT: 64 IN | SYSTOLIC BLOOD PRESSURE: 101 MMHG | TEMPERATURE: 97.9 F | HEART RATE: 61 BPM | OXYGEN SATURATION: 100 % | BODY MASS INDEX: 20.49 KG/M2 | RESPIRATION RATE: 15 BRPM | WEIGHT: 120 LBS

## 2017-05-31 DIAGNOSIS — G40.909 SEIZURE DISORDER (HCC): ICD-10-CM

## 2017-05-31 LAB
ANION GAP SERPL CALC-SCNC: 8 MMOL/L (ref 0–11.9)
BASOPHILS # BLD AUTO: 0.2 % (ref 0–1.8)
BASOPHILS # BLD: 0.01 K/UL (ref 0–0.12)
BUN SERPL-MCNC: 15 MG/DL (ref 8–22)
CALCIUM SERPL-MCNC: 9.1 MG/DL (ref 8.5–10.5)
CHLORIDE SERPL-SCNC: 106 MMOL/L (ref 96–112)
CO2 SERPL-SCNC: 24 MMOL/L (ref 20–33)
CREAT SERPL-MCNC: 0.91 MG/DL (ref 0.5–1.4)
EOSINOPHIL # BLD AUTO: 0.01 K/UL (ref 0–0.51)
EOSINOPHIL NFR BLD: 0.2 % (ref 0–6.9)
ERYTHROCYTE [DISTWIDTH] IN BLOOD BY AUTOMATED COUNT: 41 FL (ref 35.9–50)
GFR SERPL CREATININE-BSD FRML MDRD: >60 ML/MIN/1.73 M 2
GLUCOSE SERPL-MCNC: 81 MG/DL (ref 65–99)
HCT VFR BLD AUTO: 34.5 % (ref 37–47)
HGB BLD-MCNC: 10.8 G/DL (ref 12–16)
IMM GRANULOCYTES # BLD AUTO: 0.01 K/UL (ref 0–0.11)
IMM GRANULOCYTES NFR BLD AUTO: 0.2 % (ref 0–0.9)
LYMPHOCYTES # BLD AUTO: 1.29 K/UL (ref 1–4.8)
LYMPHOCYTES NFR BLD: 32 % (ref 22–41)
MCH RBC QN AUTO: 26.9 PG (ref 27–33)
MCHC RBC AUTO-ENTMCNC: 31.3 G/DL (ref 33.6–35)
MCV RBC AUTO: 85.8 FL (ref 81.4–97.8)
MONOCYTES # BLD AUTO: 0.22 K/UL (ref 0–0.85)
MONOCYTES NFR BLD AUTO: 5.5 % (ref 0–13.4)
NEUTROPHILS # BLD AUTO: 2.49 K/UL (ref 2–7.15)
NEUTROPHILS NFR BLD: 61.9 % (ref 44–72)
NRBC # BLD AUTO: 0 K/UL
NRBC BLD AUTO-RTO: 0 /100 WBC
PLATELET # BLD AUTO: 201 K/UL (ref 164–446)
PMV BLD AUTO: 10.2 FL (ref 9–12.9)
POTASSIUM SERPL-SCNC: 3.9 MMOL/L (ref 3.6–5.5)
RBC # BLD AUTO: 4.02 M/UL (ref 4.2–5.4)
SODIUM SERPL-SCNC: 138 MMOL/L (ref 135–145)
WBC # BLD AUTO: 4 K/UL (ref 4.8–10.8)

## 2017-05-31 PROCEDURE — 80048 BASIC METABOLIC PNL TOTAL CA: CPT

## 2017-05-31 PROCEDURE — 99284 EMERGENCY DEPT VISIT MOD MDM: CPT

## 2017-05-31 PROCEDURE — 85025 COMPLETE CBC W/AUTO DIFF WBC: CPT

## 2017-05-31 RX ORDER — OXCARBAZEPINE 300 MG/1
300 TABLET, FILM COATED ORAL 2 TIMES DAILY
Qty: 60 TAB | Refills: 0 | Status: SHIPPED | OUTPATIENT
Start: 2017-05-31 | End: 2021-11-21

## 2017-05-31 ASSESSMENT — ENCOUNTER SYMPTOMS
ABDOMINAL PAIN: 0
LOSS OF CONSCIOUSNESS: 1
DIZZINESS: 0
HEADACHES: 0
SPEECH CHANGE: 0
TINGLING: 0
DOUBLE VISION: 0
SEIZURES: 1
FOCAL WEAKNESS: 0
NECK PAIN: 0
FEVER: 0
COUGH: 0

## 2017-05-31 ASSESSMENT — PAIN SCALES - GENERAL: PAINLEVEL_OUTOF10: 0

## 2017-05-31 NOTE — ED AVS SNAPSHOT
5/31/2017    Nunu Friedman  [address] [place] [address]    Dear Nunu:    [facility] wants to ensure your discharge home is safe and you or your loved ones have had all of your questions answered regarding your care after you leave the hospital.    Below is a list of resources and contact information should you have any questions regarding your hospital stay, follow-up instructions, or active medical symptoms.    Questions or Concerns Regarding… Contact   Medical Questions Related to Your Discharge  (7 days a week, 8am-5pm) Contact a Nurse Care Coordinator   [phone]   Medical Questions Not Related to Your Discharge  (24 hours a day / 7 days a week)  Contact the Nurse Health Line   [phone]    Medications or Discharge Instructions Refer to your discharge packet   or contact your [facility] Primary Care Provider   [phone]   Follow-up Appointment(s) Schedule your appointment via Prot-On   or contact Scheduling [phone]   Billing Review your statement via Prot-On  or contact Billing [phone]   Medical Records Review your records via Prot-On   or contact Medical Records [phone]     You may receive a telephone call within two days of discharge. This call is to make certain you understand your discharge instructions and have the opportunity to have any questions answered. You can also easily access your medical information, test results and upcoming appointments via the Prot-On free online health management tool. You can learn more and sign up at [email or web address]/Prot-On. For assistance setting up your Prot-On account, please call [phone].    Once again, we want to ensure your discharge home is safe and that you have a clear understanding of any next steps in your care. If you have any questions or concerns, please do not hesitate to contact us, we are here for you. Thank you for choosing [facility] for your healthcare needs.    Sincerely,    Your [facility] Healthcare Team

## 2017-05-31 NOTE — ED AVS SNAPSHOT
[employer] Access Code: [email or web address]  Expires: 6/30/2017  2:05 PM    Your email address is not on file at [facility].  Email Addresses are required for you to sign up for [employer], please contact [phone] to verify your personal information and to provide your email address prior to attempting to register for [employer].    Nunu EASTON Elder  [address] [place] [address]    [employer]  A secure, online tool to manage your health information     [facility]’s [employer]® is a secure, online tool that connects you to your personalized health information from the privacy of your home -- day or night - making it very easy for you to manage your healthcare. Once the activation process is completed, you can even access your medical information using the [employer] james, which is available for free in the Apple James store or Google Play store.     To learn more about [employer], visit [email or web address]    There are two levels of access available (as shown below):   My Chart Features  [facility] Primary Care Doctor [facility]  Specialists [facility]  Urgent  Care Non-[facility] Primary Care Doctor   Email your healthcare team securely and privately 24/7 X X X    Manage appointments: schedule your next appointment; view details of past/upcoming appointments X      Request prescription refills. X      View recent personal medical records, including lab and immunizations X X X X   View health record, including health history, allergies, medications X X X X   Read reports about your outpatient visits, procedures, consult and ER notes X X X X   See your discharge summary, which is a recap of your hospital and/or ER visit that includes your diagnosis, lab results, and care plan X X  X     How to register for [employer]:  Once your e-mail address has been verified, follow the following steps to sign up for [employer].     1. Go to  [email or web address]  2. Click on the Sign Up Now box, which takes you to the New Member Sign Up page. You will  need to provide the following information:  a. Enter your Trading Blox Access Code exactly as it appears at the top of this page. (You will not need to use this code after you’ve completed the sign-up process. If you do not sign up before the expiration date, you must request a new code.)   b. Enter your date of birth.   c. Enter your home email address.   d. Click Submit, and follow the next screen’s instructions.  3. Create a Oceaneat ID. This will be your Trading Blox login ID and cannot be changed, so think of one that is secure and easy to remember.  4. Create a Trading Blox password. You can change your password at any time.  5. Enter your Password Reset Question and Answer. This can be used at a later time if you forget your password.   6. Enter your e-mail address. This allows you to receive e-mail notifications when new information is available in Trading Blox.  7. Click Sign Up. You can now view your health information.    For assistance activating your Trading Blox account, call [phone]

## 2017-05-31 NOTE — ED AVS SNAPSHOT
Home Care Instructions                                                                                                                Nunu Friedman   MRN: [id number]    Department:  [facility], Emergency Dept   Date of Visit:  5/31/2017            [facility], Emergency Dept    [address] [place] [id number]    Phone:  [phone]      You were seen by     Lyudmila Ibanez D.O.      Your Diagnosis Was     Seizure disorder (CMS-[facility])     G40.909       Follow-up Information     1. Follow up with He Locke M.D..    Specialty:  Neurology    Why:  this week    Contact information    [address]  [address] [address] [phone]          2. Follow up with [facility], Emergency Dept.    Specialty:  Emergency Medicine    Contact information    [address] [facility] [address] [phone]        3. Follow up with [facility], Emergency Dept.    Specialty:  Emergency Medicine    Why:  If symptoms worsen    Contact information    [address] [place] [address] [phone]      Medication Information     Review all of your home medications and newly ordered medications with your primary doctor and/or pharmacist as soon as possible. Follow medication instructions as directed by your doctor and/or pharmacist.     Please keep your complete medication list with you and share with your physician. Update the information when medications are discontinued, doses are changed, or new medications (including over-the-counter products) are added; and carry medication information at all times in the event of emergency situations.               Medication List      START taking these medications        Instructions    Morning Afternoon Evening Bedtime    oxcarbazepine 300 MG Tabs   Commonly known as:  TRILEPTAL        Take 1 Tab by mouth 2 times a day.   Dose:  300 mg                          ASK your doctor about  these medications        Instructions    Morning Afternoon Evening Bedtime    nitrofurantoin monohydr macro 100 MG Caps   Commonly known as:  MACROBID        Take 1 Cap by mouth 2 times a day.   Dose:  100 mg                             Where to Get Your Medications      You can get these medications from any pharmacy     Bring a paper prescription for each of these medications    - oxcarbazepine 300 MG Tabs            Procedures and tests performed during your visit     BASIC METABOLIC PANEL    CBC WITH DIFFERENTIAL    ESTIMATED GFR        Discharge Instructions       Seizure, Adult  A seizure is abnormal electrical activity in the brain. Seizures usually last from 30 seconds to 2 minutes. There are various types of seizures.  Before a seizure, you may have a warning sensation (aura) that a seizure is about to occur. An aura may include the following symptoms:   · Fear or anxiety.  · Nausea.  · Feeling like the room is spinning (vertigo).  · Vision changes, such as seeing flashing lights or spots.  Common symptoms during a seizure include:  · A change in attention or behavior (altered mental status).  · Convulsions with rhythmic jerking movements.  · Drooling.  · Rapid eye movements.  · Grunting.  · Loss of bladder and bowel control.  · Bitter taste in the mouth.  · Tongue biting.  After a seizure, you may feel confused and sleepy. You may also have an injury resulting from convulsions during the seizure.  HOME CARE INSTRUCTIONS   · If you are given medicines, take them exactly as prescribed by your health care provider.  · Keep all follow-up appointments as directed by your health care provider.  · Do not swim or drive or engage in risky activity during which a seizure could cause further injury to you or others until your health care provider says it is OK.  · Get adequate rest.  · Teach friends and family what to do if you have a seizure. They should:  ¨ Lay you on the ground to prevent a fall.  ¨ Put a cushion  under your head.  ¨ Loosen any tight clothing around your neck.  ¨ Turn you on your side. If vomiting occurs, this helps keep your airway clear.  ¨ Stay with you until you recover.  ¨ Know whether or not you need emergency care.  SEEK IMMEDIATE MEDICAL CARE IF:  · The seizure lasts longer than 5 minutes.  · The seizure is severe or you do not wake up immediately after the seizure.  · You have an altered mental status after the seizure.  · You are having more frequent or worsening seizures.  Someone should drive you to the emergency department or call local emergency services (911 in [place]  MAKE SURE YOU:  · Understand these instructions.  · Will watch your condition.  · Will get help right away if you are not doing well or get worse.     This information is not intended to replace advice given to you by your health care provider. Make sure you discuss any questions you have with your health care provider.     Document Released: 12/15/2001 Document Revised: 01/08/2016 Document Reviewed: 07/30/2014  [employer] Interactive Patient Education ©2016 [employer]            Patient Information     Patient Information    Following emergency treatment: all patient requiring follow-up care must return either to a private physician or a clinic if your condition worsens before you are able to obtain further medical attention, please return to the emergency room.     Billing Information    At [facility], we work to make the billing process streamlined for our patients.  Our Representatives are here to answer any questions you may have regarding your hospital bill.  If you have insurance coverage and have supplied your insurance information to us, we will submit a claim to your insurer on your behalf.  Should you have any questions regarding your bill, we can be reached online or by phone as follows:  Online: You are able pay your bills online or live chat with our representatives about any billing questions you may have. We  are here to help Monday - Friday from 8:00am to 7:30pm and 9:00am - 12:00pm on Saturdays.  Please visit [email or web address]  for more information.   Phone:  [phone] or [phone]    Please note that your emergency physician, surgeon, pathologist, radiologist, anesthesiologist, and other specialists are not employed by [facility] and will therefore bill separately for their services.  Please contact them directly for any questions concerning their bills at the numbers below:     Emergency Physician Services:  [phone]  [facility]:  [phone]  [facility]:  [phone]  [facility]:  [phone]    1. Your final bill may vary from the amount quoted upon discharge if all procedures are not complete at that time, or if your doctor has additional procedures of which we are not aware. You will receive an additional bill if you return to the Emergency Department at [facility] for suture removal regardless of the facility of which the sutures were placed.     2. Please arrange for settlement of this account at the emergency registration.    3. All self-pay accounts are due in full at the time of treatment.  If you are unable to meet this obligation then payment is expected within 4-5 days.     4. If you have had radiology studies (CT, X-ray, Ultrasound, MRI), you have received a preliminary result during your emergency department visit. Please contact the radiology department [phone] to receive a copy of your final result. Please discuss the Final result with your primary physician or with the follow up physician provided.     Crisis Hotline:  [place] Crisis Hotline:  0-139-ZNCACNY or 1-989.354.8882  [place] Crisis Hotline:    1-758.144.9882 or 479-309-7974         ED Discharge Follow Up Questions    1. In order to provide you with very good care, we would like to follow up with a phone call in the next few days.  May  we have your permission to contact you?     YES /  NO    2. What is the best phone number to call you? (       )_____-__________    3. What is the best time to call you?      Morning  /  Afternoon  /  Evening                   Patient Signature:  ____________________________________________________________    Date:  ____________________________________________________________

## 2017-05-31 NOTE — ED PROVIDER NOTES
"ED Provider Note    Scribed for Lyudmila Ibanez D.O. by Jamie Yoder. 5/31/2017, 12:42 PM.    Primary care provider: Pcp Pt States None  Means of arrival: EMS  History obtained from: Patient  History limited by: None    CHIEF COMPLAINT  Chief Complaint   Patient presents with   • Seizure     HX       HPI  Nunu Friedman is a 21 y.o. female with a history of seizures who presents to the Emergency Department after having a seizure just prior to arrival. Patient states she was sitting down listening to music when she suddenly experienced the seizure. She states \"I passed out\" at the V. She denies feeling the seizure coming on. Patient states she otherwise feels normal at this time. Patient does not localize any pain anywhere. She denies any associated fever, cough, abdominal pain, headache, congestion, neck pain, or difficulty walking. Patient notes no recent evaluated by a neurologist, although, she has been told multiple times that she should follow up with neurology, she states she has not made arrangements to do so. She is not on any medications for the seizures, though she apparently has been in the past. She notes being born with cerebral palsy. Patient denies history of diabetes or asthma. She notes smoking marijuana.    REVIEW OF SYSTEMS  Review of Systems   Constitutional: Negative for fever.   HENT: Negative for congestion.    Eyes: Negative for double vision.   Respiratory: Negative for cough.    Cardiovascular: Negative for chest pain.   Gastrointestinal: Negative for abdominal pain.   Musculoskeletal: Negative for neck pain.        Negative difficulty walking   Neurological: Positive for seizures and loss of consciousness. Negative for dizziness, tingling, speech change, focal weakness and headaches.   All other systems reviewed and are negative.  C    PAST MEDICAL HISTORY   has a past medical history of Cerebral palsy (CMS-HCC); Anxiety; Psychiatric disorder; and MS (multiple sclerosis) " "(CMS-[facility]).    SURGICAL HISTORY  patient denies any surgical history    SOCIAL HISTORY  Social History   Substance Use Topics   • Smoking status: Current Every Day Smoker -- 0.25 packs/day     Types: Cigarettes   • Alcohol Use: Yes      Comment: socially      History   Drug Use No       FAMILY HISTORY  None noted    CURRENT MEDICATIONS  No current facility-administered medications on file prior to encounter.     Current Outpatient Prescriptions on File Prior to Encounter   Medication Sig Dispense Refill   • nitrofurantoin monohydr macro (MACROBID) 100 MG Cap Take 1 Cap by mouth 2 times a day. 20 Cap 0      ALLERGIES  No Known Allergies    PHYSICAL EXAM  VITAL SIGNS: /63 mmHg  Pulse 79  Temp(Src) 36.6 °C (97.9 °F)  Resp 15  Ht 1.626 m (5' 4.02\")  Wt 54.432 kg (120 lb)  BMI 20.59 kg/m2  SpO2 98%  Vitals reviewed.  Constitutional: Patient is oriented to person, place, and time. Appears well-developed and well-nourished. No distress.    Head: Normocephalic and atraumatic.   Ears: Normal external ears bilaterally.   Mouth/Throat: Oropharynx is clear and moist  Eyes: Conjunctivae are normal. Pupils are equal, round, and reactive to light.   Neck: Normal range of motion. Neck supple.  Cardiovascular: Normal rate, regular rhythm and normal heart sounds. Normal peripheral pulses.  Pulmonary/Chest: Effort normal and breath sounds normal. No respiratory distress, no wheezes, rhonchi, or rales.   Abdominal: Soft. Bowel sounds are normal. There is no tenderness, rebound or guarding, or peritoneal signs  Musculoskeletal: No edema and no tenderness.   Neurological: No focal deficits.   Skin: Skin is warm and dry. No erythema. No pallor.   Psychiatric: Patient has a normal mood and affect.     LABS  Results for orders placed or performed during the hospital encounter of 05/31/17   CBC WITH DIFFERENTIAL   Result Value Ref Range    WBC 4.0 (L) 4.8 - 10.8 K/uL    RBC 4.02 (L) 4.20 - 5.40 M/uL    Hemoglobin 10.8 (L) 12.0 - " 16.0 g/dL    Hematocrit 34.5 (L) 37.0 - 47.0 %    MCV 85.8 81.4 - 97.8 fL    MCH 26.9 (L) 27.0 - 33.0 pg    MCHC 31.3 (L) 33.6 - 35.0 g/dL    RDW 41.0 35.9 - 50.0 fL    Platelet Count 201 164 - 446 K/uL    MPV 10.2 9.0 - 12.9 fL    Neutrophils-Polys 61.90 44.00 - 72.00 %    Lymphocytes 32.00 22.00 - 41.00 %    Monocytes 5.50 0.00 - 13.40 %    Eosinophils 0.20 0.00 - 6.90 %    Basophils 0.20 0.00 - 1.80 %    Immature Granulocytes 0.20 0.00 - 0.90 %    Nucleated RBC 0.00 /100 WBC    Neutrophils (Absolute) 2.49 2.00 - 7.15 K/uL    Lymphs (Absolute) 1.29 1.00 - 4.80 K/uL    Monos (Absolute) 0.22 0.00 - 0.85 K/uL    Eos (Absolute) 0.01 0.00 - 0.51 K/uL    Baso (Absolute) 0.01 0.00 - 0.12 K/uL    Immature Granulocytes (abs) 0.01 0.00 - 0.11 K/uL    NRBC (Absolute) 0.00 K/uL   BASIC METABOLIC PANEL   Result Value Ref Range    Sodium 138 135 - 145 mmol/L    Potassium 3.9 3.6 - 5.5 mmol/L    Chloride 106 96 - 112 mmol/L    Co2 24 20 - 33 mmol/L    Glucose 81 65 - 99 mg/dL    Bun 15 8 - 22 mg/dL    Creatinine 0.91 0.50 - 1.40 mg/dL    Calcium 9.1 8.5 - 10.5 mg/dL    Anion Gap 8.0 0.0 - 11.9   ESTIMATED GFR   Result Value Ref Range    GFR If African American >60 >60 mL/min/1.73 m 2    GFR If Non African American >60 >60 mL/min/1.73 m 2      All labs reviewed by me.    COURSE & MEDICAL DECISION MAKING  Pertinent Labs & Imaging studies reviewed. (See chart for details)    Obtained and reviewed past medical records which show patient was here in April 2017 for seizure. At that time, Dr. Locke recommended increasing trileptal to 300 mg twice per day. Patient had multiple other ED visits for psychosis and hallucinations.    12:42 PM - Patient seen and examined at bedside. Feeling her normal state of health. No neurologic deficits. History of multiple prior seizures. Not currently on any seizure medications. Ordered estimated GFR, CBC, BMP, urinalysis culture to evaluate her symptoms. The differential diagnoses include but are not  limited to: seizure disorder, non compliance with medications, electrolyte disturbance, UTI     1:58 PM Patient reevaluated at bedside. Discussed lab results as seen above. Advised patient to call and schedule an appointment with Dr. Locke (neurology). We restarted on her previous Trileptal which she previously was taking for seizures. The patient will be discharged and should return if symptoms worsen or if new symptoms arise. The patient understands and agrees to plan.        The patient will return for new or worsening symptoms and is stable at the time of discharge.    The patient is referred to a primary physician for blood pressure management, diabetic screening, and for all other preventative health concerns.    DISPOSITION:  Patient will be discharged home in stable condition.    FOLLOW UP:  He Locke M.D.  [address] [place] [id number] [phone]      this week    [facility], Emergency Dept  [address] [place] [address]  [phone]        [facility], Emergency Dept  [address] [place] [address]  [phone]    If symptoms worsen      OUTPATIENT MEDICATIONS:  Discharge Medication List as of 5/31/2017  2:05 PM      START taking these medications    Details   oxcarbazepine (TRILEPTAL) 300 MG Tab Take 1 Tab by mouth 2 times a day., Disp-60 Tab, R-0, Print Rx Paper                FINAL IMPRESSION  1. Seizure disorder (CMS-[facility])          Jamie AVILA (Scribe), am scribing for, and in the presence of, Lyudmila Ibanez D.O..    Electronically signed by: Jamie Yoder (Arleneibe), 5/31/2017    Lyudmila AVILA D.O. personally performed the services described in this documentation, as scribed by Jamie Yoder in my presence, and it is both accurate and complete.    The note accurately reflects work and decisions made by me.  Lyudmila Ibanez  5/31/2017  5:56 PM

## 2017-05-31 NOTE — ED NOTES
PT verbalizes understanding of discharge instructions. Patient states she will follow up with neuro and take her medications but she is usually noncompliant. Patient ambulatory to discharge with steady gait. NAD or deficits noted at time of discharge

## 2017-05-31 NOTE — DISCHARGE INSTRUCTIONS
Seizure, Adult  A seizure is abnormal electrical activity in the brain. Seizures usually last from 30 seconds to 2 minutes. There are various types of seizures.  Before a seizure, you may have a warning sensation (aura) that a seizure is about to occur. An aura may include the following symptoms:   · Fear or anxiety.  · Nausea.  · Feeling like the room is spinning (vertigo).  · Vision changes, such as seeing flashing lights or spots.  Common symptoms during a seizure include:  · A change in attention or behavior (altered mental status).  · Convulsions with rhythmic jerking movements.  · Drooling.  · Rapid eye movements.  · Grunting.  · Loss of bladder and bowel control.  · Bitter taste in the mouth.  · Tongue biting.  After a seizure, you may feel confused and sleepy. You may also have an injury resulting from convulsions during the seizure.  HOME CARE INSTRUCTIONS   · If you are given medicines, take them exactly as prescribed by your health care provider.  · Keep all follow-up appointments as directed by your health care provider.  · Do not swim or drive or engage in risky activity during which a seizure could cause further injury to you or others until your health care provider says it is OK.  · Get adequate rest.  · Teach friends and family what to do if you have a seizure. They should:  ¨ Lay you on the ground to prevent a fall.  ¨ Put a cushion under your head.  ¨ Loosen any tight clothing around your neck.  ¨ Turn you on your side. If vomiting occurs, this helps keep your airway clear.  ¨ Stay with you until you recover.  ¨ Know whether or not you need emergency care.  SEEK IMMEDIATE MEDICAL CARE IF:  · The seizure lasts longer than 5 minutes.  · The seizure is severe or you do not wake up immediately after the seizure.  · You have an altered mental status after the seizure.  · You are having more frequent or worsening seizures.  Someone should drive you to the emergency department or call local emergency  services (911 in [place]  MAKE SURE YOU:  · Understand these instructions.  · Will watch your condition.  · Will get help right away if you are not doing well or get worse.     This information is not intended to replace advice given to you by your health care provider. Make sure you discuss any questions you have with your health care provider.     Document Released: 12/15/2001 Document Revised: 01/08/2016 Document Reviewed: 07/30/2014  [employer] Interactive Patient Education ©2016 [employer]

## 2017-05-31 NOTE — ED NOTES
PT BIBA to room 25. Patient has mental health HX, a little cerebral palsy (per patient) and epilepsy. PT does not take any medications. PT was at the [facility] when she had a seizure, she states she hit the back of her head and is C/O head pain. Not laceration or swelling noted. Patient is A&O x 4

## 2017-06-12 ENCOUNTER — APPOINTMENT (OUTPATIENT)
Dept: RADIOLOGY | Facility: MEDICAL CENTER | Age: 22
End: 2017-06-12
Attending: EMERGENCY MEDICINE
Payer: MEDICAID

## 2017-06-12 ENCOUNTER — HOSPITAL ENCOUNTER (EMERGENCY)
Facility: MEDICAL CENTER | Age: 22
End: 2017-06-12
Attending: EMERGENCY MEDICINE
Payer: MEDICAID

## 2017-06-12 VITALS
WEIGHT: 116.84 LBS | HEART RATE: 74 BPM | SYSTOLIC BLOOD PRESSURE: 115 MMHG | TEMPERATURE: 97 F | DIASTOLIC BLOOD PRESSURE: 67 MMHG | BODY MASS INDEX: 19.95 KG/M2 | HEIGHT: 64 IN | OXYGEN SATURATION: 100 % | RESPIRATION RATE: 14 BRPM

## 2017-06-12 DIAGNOSIS — Z32.01 PREGNANCY EXAMINATION OR TEST, POSITIVE RESULT: ICD-10-CM

## 2017-06-12 DIAGNOSIS — R10.31 RLQ ABDOMINAL PAIN: ICD-10-CM

## 2017-06-12 DIAGNOSIS — K59.00 CONSTIPATION, UNSPECIFIED CONSTIPATION TYPE: ICD-10-CM

## 2017-06-12 LAB
ALBUMIN SERPL BCP-MCNC: 4.2 G/DL (ref 3.2–4.9)
ALBUMIN/GLOB SERPL: 1.6 G/DL
ALP SERPL-CCNC: 45 U/L (ref 30–99)
ALT SERPL-CCNC: 9 U/L (ref 2–50)
ANION GAP SERPL CALC-SCNC: 7 MMOL/L (ref 0–11.9)
APPEARANCE UR: ABNORMAL
AST SERPL-CCNC: 13 U/L (ref 12–45)
BILIRUB SERPL-MCNC: 0.2 MG/DL (ref 0.1–1.5)
BUN SERPL-MCNC: 10 MG/DL (ref 8–22)
CALCIUM SERPL-MCNC: 8.8 MG/DL (ref 8.5–10.5)
CHLORIDE SERPL-SCNC: 109 MMOL/L (ref 96–112)
CO2 SERPL-SCNC: 23 MMOL/L (ref 20–33)
COLOR UR AUTO: YELLOW
CREAT SERPL-MCNC: 0.74 MG/DL (ref 0.5–1.4)
GFR SERPL CREATININE-BSD FRML MDRD: >60 ML/MIN/1.73 M 2
GLOBULIN SER CALC-MCNC: 2.6 G/DL (ref 1.9–3.5)
GLUCOSE SERPL-MCNC: 95 MG/DL (ref 65–99)
GLUCOSE UR QL STRIP.AUTO: NEGATIVE MG/DL
HCG UR QL: POSITIVE
KETONES UR QL STRIP.AUTO: NEGATIVE MG/DL
LEUKOCYTE ESTERASE UR QL STRIP.AUTO: NEGATIVE
LIPASE SERPL-CCNC: 23 U/L (ref 11–82)
NITRITE UR QL STRIP.AUTO: NEGATIVE
PH UR STRIP.AUTO: 7 [PH]
POTASSIUM SERPL-SCNC: 3.7 MMOL/L (ref 3.6–5.5)
PROT SERPL-MCNC: 6.8 G/DL (ref 6–8.2)
PROT UR QL STRIP: NEGATIVE MG/DL
RBC UR QL AUTO: NEGATIVE
SODIUM SERPL-SCNC: 139 MMOL/L (ref 135–145)
SP GR UR: 1.02

## 2017-06-12 PROCEDURE — A9270 NON-COVERED ITEM OR SERVICE: HCPCS | Performed by: EMERGENCY MEDICINE

## 2017-06-12 PROCEDURE — 81002 URINALYSIS NONAUTO W/O SCOPE: CPT

## 2017-06-12 PROCEDURE — 36415 COLL VENOUS BLD VENIPUNCTURE: CPT

## 2017-06-12 PROCEDURE — 700102 HCHG RX REV CODE 250 W/ 637 OVERRIDE(OP): Performed by: EMERGENCY MEDICINE

## 2017-06-12 PROCEDURE — 81025 URINE PREGNANCY TEST: CPT

## 2017-06-12 PROCEDURE — 99284 EMERGENCY DEPT VISIT MOD MDM: CPT

## 2017-06-12 RX ADMIN — MAGNESIUM CITRATE 148 ML: 1.75 LIQUID ORAL at 12:50

## 2017-06-12 ASSESSMENT — PAIN SCALES - GENERAL: PAINLEVEL_OUTOF10: 6

## 2017-06-12 NOTE — ED PROVIDER NOTES
" I, Hernandez Renteria (Scribe), am scribing for, and in the presence of, Susie Bowles M.D..    Electronically signed by: Hernandez Renteria (Vern), 2017    CHIEF COMPLAINT  Chief Complaint   Patient presents with   • Abdominal Pain     x 1 week   • Constipation     last normal BM saturday        HPI  Ines Friedman is a 21 y.o. female who presents to the Emergency Department complaining of intermittent abdominal pain located to her right lower abdomen. She reports he pain is present only when she is having a bowel movement. She reports her stool is hard and resembles \"small rabiit turds\". She denies fever, nausea, vomiting, urinary retention, urinary incontinence. Patient has a history of . She has been taking peptibisol.     REVIEW OF SYSTEMS  Pertinent positive include: abdominal pain. Pertinent negatives include: fever, nausea, vomiting, urinary retention and urinary incontinence. See HPI for further details. All other systems are negative.C.     PAST MEDICAL HISTORY  Past Medical History   Diagnosis Date   • Seizure disorder (CMS-HCC)      once       FAMILY HISTORY  History reviewed. No pertinent family history.    SOCIAL HISTORY  Social History     Social History   • Marital Status: Single     Spouse Name: N/A   • Number of Children: N/A   • Years of Education: N/A     Social History Main Topics   • Smoking status: Current Every Day Smoker -- 0.50 packs/day     Types: Cigarettes   • Smokeless tobacco: Never Used   • Alcohol Use: No   • Drug Use: No   • Sexual Activity:     Partners: Male      Comment: NONE      Other Topics Concern   • None     Social History Narrative       SURGICAL HISTORY  Past Surgical History   Procedure Laterality Date   • Primary c section  2015     Procedure: PRIMARY C SECTION;  Surgeon: Jenni Worrell M.D.;  Location: LABOR AND DELIVERY;  Service:        CURRENT MEDICATIONS  Home Medications     Reviewed by Idalia Aquino R.N. (Registered Nurse) on " "06/12/17 at 1123  Med List Status: Complete    Medication Last Dose Status          Patient Patel Taking any Medications                        ALLERGIES  No Known Allergies    PHYSICAL EXAM  VITAL SIGNS: /67 mmHg  Pulse 88  Temp(Src) 36.1 °C (97 °F)  Resp 14  Ht 1.626 m (5' 4.02\")  Wt 53 kg (116 lb 13.5 oz)  BMI 20.05 kg/m2  SpO2 100%  LMP 02/28/2016 @CLARISSA[[id number]::@   Constitutional: Well developed, Well nourished, No acute respiratory distress, Non-toxic appearance.   HENT: Normocephalic, Atraumatic, Bilateral external ears normal, Oropharynx clear, mucous membranes are moist.  Eyes: PERRLA, EOMI, Conjunctiva normal, No discharge. No icterus.  Neck: Normal range of motion. Supple, No stridor.   Lymphatic: No cervical lymphadenopathy noted.   Cardiovascular: Normal heart rate, Normal rhythm, No murmurs, No rubs, No gallops.   Thorax & Lungs: Clear to auscultation bilaterally, No respiratory distress, No wheezing.  Abdomen: soft, scaphoid, no tenderness to palpation. No rebound or guarding.  Skin: Warm, Dry, No erythema, No rash.   Extremities: Intact distal pulses, No edema, No tenderness  Musculoskeletal: Good range of motion in all major joints. No tenderness to palpation or major deformities noted. Normal gait.  Neurologic: Alert & oriented x 3, cranial nerve and cerebellar exams grossly normal. No focal deficits noted.   Psychiatric: Affect normal, Judgment normal, Mood normal.         COURSE & MEDICAL DECISION MAKING  Pertinent Labs & Imaging studies reviewed. (See chart for details)  Results for orders placed or performed during the hospital encounter of 06/12/17   COMP METABOLIC PANEL   Result Value Ref Range    Sodium 139 135 - 145 mmol/L    Potassium 3.7 3.6 - 5.5 mmol/L    Chloride 109 96 - 112 mmol/L    Co2 23 20 - 33 mmol/L    Anion Gap 7.0 0.0 - 11.9    Glucose 95 65 - 99 mg/dL    Bun 10 8 - 22 mg/dL    Creatinine 0.74 0.50 - 1.40 mg/dL    Calcium 8.8 8.5 - 10.5 mg/dL    AST(SGOT) 13 12 - " 45 U/L    ALT(SGPT) 9 2 - 50 U/L    Alkaline Phosphatase 45 30 - 99 U/L    Total Bilirubin 0.2 0.1 - 1.5 mg/dL    Albumin 4.2 3.2 - 4.9 g/dL    Total Protein 6.8 6.0 - 8.2 g/dL    Globulin 2.6 1.9 - 3.5 g/dL    A-G Ratio 1.6 g/dL   LIPASE   Result Value Ref Range    Lipase 23 11 - 82 U/L   ESTIMATED GFR   Result Value Ref Range    GFR If African American >60 >60 mL/min/1.73 m 2    GFR If Non African American >60 >60 mL/min/1.73 m 2   POC UA   Result Value Ref Range    POC Color Yellow     POC Appearance Slightly Cloudy (A)     POC Glucose Negative Negative mg/dL    POC Ketones Negative Negative mg/dL    POC Specific Gravity 1.025 1.005-1.030    POC Blood Negative Negative    POC Urine PH 7.0 5.0-8.0    POC Protein Negative Negative mg/dL    POC Nitrites Negative Negative    POC Leukocyte Esterase Negative Negative   POC URINE PREGNANCY   Result Value Ref Range    POC Urine Pregnancy Test Positive (A) Negative         12:23 PM Patient seen and examined at bedside. The patient presents with abdominal pain and the differential diagnosis includes but is not limited to constipation, obstipation, obstruction, cystitis. Ordered for POC urine pregnancy, POC UA, CMP, Lipase, CBC with differential,  to evaluate. Patient will be treated with Magnesium Citrate 124 mL for her symptoms. Informed her she could be constipated. Will perform an ultrasound to rule out pregnancy and then if it is negative will perform an X-ray to rule out obstruction. Informed patient peptobismo may be exacerbating her complaint.     Upon learning that the patient was pregnant, I canceled the x-ray and ordered an ultrasound to assure that the patient did not have an ectopic pregnancy.    3:09 PM Patient eloped. Lab results patient is pregnant. She asked the technician performing an ultrasound whether he would be able to inform her how far along her pregnancy was. She eloped shortly thereafter. /67 mmHg  Pulse 74  Temp(Src) 36.1 °C (97 °F)   "Resp 14  Ht 1.626 m (5' 4.02\")  Wt 53 kg (116 lb 13.5 oz)  BMI 20.05 kg/m2  SpO2 100%  LMP 02/28/2016      FINAL IMPRESSION  1. RLQ abdominal pain    2. Constipation, unspecified constipation type    3. Pregnancy examination or test, positive result        PRESCRIPTIONS  New Prescriptions    No medications on file       FOLLOW UP  No follow-up provider specified.      -DISCHARGE-  FINAL IMPRESSION  1. RLQ abdominal pain    2. Constipation, unspecified constipation type    3. Pregnancy examination or test, positive result           IHernandez (Scribe), am scribing for, and in the presence of, Susie Bowles M.D..    Electronically signed by: Hernandez Renteria (Arleneiblakshmi), 6/12/2017    ISusie M.D. personally performed the services described in this documentation, as scribed by Hernandez Renteria in my presence, and it is both accurate and complete.    The note accurately reflects work and decisions made by me.  Susie Bowles  6/12/2017  5:13 PM  Electronically signed by: Hernandez Renteria, 6/12/2017 12:19 PM        "

## 2017-06-12 NOTE — ED NOTES
Pt was updated on POC, and told she will be getting an US to r/o ectopic pregnancy.  Pt asked the US tech if she could tell her how far along she was, the tech told her no, the radiologist reads it and then the ERP will go over results with her.  Pt said she did not want to stay because it would take too long.  Pt left AMA before signing paperwork.  Dr. Bowles updated.

## 2017-06-12 NOTE — ED NOTES
POC urine pregnancy positive, EPR aware, orders received for transvaginal US to r/o ectopic pregnancy.

## 2017-06-12 NOTE — ED NOTES
22 y/o female ambulate to triage  Chief Complaint   Patient presents with   • Abdominal Pain     x 1 week   • Constipation     last normal BM saturday

## 2017-06-13 ENCOUNTER — APPOINTMENT (OUTPATIENT)
Dept: RADIOLOGY | Facility: MEDICAL CENTER | Age: 22
End: 2017-06-13
Attending: EMERGENCY MEDICINE
Payer: MEDICAID

## 2017-06-13 ENCOUNTER — HOSPITAL ENCOUNTER (EMERGENCY)
Facility: MEDICAL CENTER | Age: 22
End: 2017-06-13
Attending: EMERGENCY MEDICINE | Admitting: OBSTETRICS & GYNECOLOGY
Payer: MEDICAID

## 2017-06-13 VITALS
HEIGHT: 64 IN | BODY MASS INDEX: 20.17 KG/M2 | OXYGEN SATURATION: 100 % | HEART RATE: 72 BPM | WEIGHT: 118.17 LBS | DIASTOLIC BLOOD PRESSURE: 87 MMHG | SYSTOLIC BLOOD PRESSURE: 124 MMHG | RESPIRATION RATE: 16 BRPM | TEMPERATURE: 98 F

## 2017-06-13 DIAGNOSIS — O03.9 MISCARRIAGE: ICD-10-CM

## 2017-06-13 LAB
B-HCG SERPL-ACNC: 566.9 MIU/ML (ref 0–5)
BACTERIA GENITAL QL WET PREP: NORMAL
BASOPHILS # BLD AUTO: 0.6 % (ref 0–1.8)
BASOPHILS # BLD: 0.03 K/UL (ref 0–0.12)
C TRACH DNA SPEC QL NAA+PROBE: NEGATIVE
EOSINOPHIL # BLD AUTO: 0.13 K/UL (ref 0–0.51)
EOSINOPHIL NFR BLD: 2.5 % (ref 0–6.9)
ERYTHROCYTE [DISTWIDTH] IN BLOOD BY AUTOMATED COUNT: 48.1 FL (ref 35.9–50)
HCT VFR BLD AUTO: 38.1 % (ref 37–47)
HGB BLD-MCNC: 12 G/DL (ref 12–16)
IMM GRANULOCYTES # BLD AUTO: 0.01 K/UL (ref 0–0.11)
IMM GRANULOCYTES NFR BLD AUTO: 0.2 % (ref 0–0.9)
LYMPHOCYTES # BLD AUTO: 1.36 K/UL (ref 1–4.8)
LYMPHOCYTES NFR BLD: 26 % (ref 22–41)
MCH RBC QN AUTO: 26.3 PG (ref 27–33)
MCHC RBC AUTO-ENTMCNC: 31.5 G/DL (ref 33.6–35)
MCV RBC AUTO: 83.4 FL (ref 81.4–97.8)
MONOCYTES # BLD AUTO: 0.24 K/UL (ref 0–0.85)
MONOCYTES NFR BLD AUTO: 4.6 % (ref 0–13.4)
N GONORRHOEA DNA SPEC QL NAA+PROBE: NEGATIVE
NEUTROPHILS # BLD AUTO: 3.47 K/UL (ref 2–7.15)
NEUTROPHILS NFR BLD: 66.1 % (ref 44–72)
NRBC # BLD AUTO: 0 K/UL
NRBC BLD AUTO-RTO: 0 /100 WBC
PLATELET # BLD AUTO: 182 K/UL (ref 164–446)
PMV BLD AUTO: 10.4 FL (ref 9–12.9)
RBC # BLD AUTO: 4.57 M/UL (ref 4.2–5.4)
SIGNIFICANT IND 70042: NORMAL
SITE SITE: NORMAL
SOURCE SOURCE: NORMAL
SPECIMEN SOURCE: NORMAL
WBC # BLD AUTO: 5.2 K/UL (ref 4.8–10.8)

## 2017-06-13 PROCEDURE — 160036 HCHG PACU - EA ADDL 30 MINS PHASE I: Performed by: OBSTETRICS & GYNECOLOGY

## 2017-06-13 PROCEDURE — 160002 HCHG RECOVERY MINUTES (STAT): Performed by: OBSTETRICS & GYNECOLOGY

## 2017-06-13 PROCEDURE — 160048 HCHG OR STATISTICAL LEVEL 1-5: Performed by: OBSTETRICS & GYNECOLOGY

## 2017-06-13 PROCEDURE — A9270 NON-COVERED ITEM OR SERVICE: HCPCS

## 2017-06-13 PROCEDURE — 88305 TISSUE EXAM BY PATHOLOGIST: CPT

## 2017-06-13 PROCEDURE — 36415 COLL VENOUS BLD VENIPUNCTURE: CPT

## 2017-06-13 PROCEDURE — 700105 HCHG RX REV CODE 258: Performed by: OBSTETRICS & GYNECOLOGY

## 2017-06-13 PROCEDURE — 160028 HCHG SURGERY MINUTES - 1ST 30 MINS LEVEL 3: Performed by: OBSTETRICS & GYNECOLOGY

## 2017-06-13 PROCEDURE — 700111 HCHG RX REV CODE 636 W/ 250 OVERRIDE (IP)

## 2017-06-13 PROCEDURE — 84702 CHORIONIC GONADOTROPIN TEST: CPT

## 2017-06-13 PROCEDURE — 36415 COLL VENOUS BLD VENIPUNCTURE: CPT | Performed by: OBSTETRICS & GYNECOLOGY

## 2017-06-13 PROCEDURE — 99291 CRITICAL CARE FIRST HOUR: CPT

## 2017-06-13 PROCEDURE — 160009 HCHG ANES TIME/MIN: Performed by: OBSTETRICS & GYNECOLOGY

## 2017-06-13 PROCEDURE — 85025 COMPLETE CBC W/AUTO DIFF WBC: CPT

## 2017-06-13 PROCEDURE — 87591 N.GONORRHOEAE DNA AMP PROB: CPT

## 2017-06-13 PROCEDURE — 502587 HCHG PACK, D&C: Performed by: OBSTETRICS & GYNECOLOGY

## 2017-06-13 PROCEDURE — 700101 HCHG RX REV CODE 250

## 2017-06-13 PROCEDURE — 700102 HCHG RX REV CODE 250 W/ 637 OVERRIDE(OP)

## 2017-06-13 PROCEDURE — 500342 HCHG CURRETTE TIP, VAC CURV: Performed by: OBSTETRICS & GYNECOLOGY

## 2017-06-13 PROCEDURE — 160035 HCHG PACU - 1ST 60 MINS PHASE I: Performed by: OBSTETRICS & GYNECOLOGY

## 2017-06-13 PROCEDURE — 87491 CHLMYD TRACH DNA AMP PROBE: CPT

## 2017-06-13 PROCEDURE — 76817 TRANSVAGINAL US OBSTETRIC: CPT

## 2017-06-13 PROCEDURE — 502240 HCHG MISC OR SUPPLY RC 0272: Performed by: OBSTETRICS & GYNECOLOGY

## 2017-06-13 RX ORDER — LIDOCAINE AND PRILOCAINE 25; 25 MG/G; MG/G
1 CREAM TOPICAL
Status: DISCONTINUED | OUTPATIENT
Start: 2017-06-13 | End: 2017-06-13 | Stop reason: HOSPADM

## 2017-06-13 RX ORDER — OXYCODONE HYDROCHLORIDE 5 MG/1
10 TABLET ORAL
Status: DISCONTINUED | OUTPATIENT
Start: 2017-06-13 | End: 2017-06-13 | Stop reason: HOSPADM

## 2017-06-13 RX ORDER — SODIUM CHLORIDE, SODIUM LACTATE, POTASSIUM CHLORIDE, CALCIUM CHLORIDE 600; 310; 30; 20 MG/100ML; MG/100ML; MG/100ML; MG/100ML
INJECTION, SOLUTION INTRAVENOUS CONTINUOUS
Status: DISCONTINUED | OUTPATIENT
Start: 2017-06-13 | End: 2017-06-13 | Stop reason: HOSPADM

## 2017-06-13 RX ORDER — MISOPROSTOL 200 UG/1
TABLET ORAL
Status: DISCONTINUED
Start: 2017-06-13 | End: 2017-06-13 | Stop reason: HOSPADM

## 2017-06-13 RX ORDER — OXYTOCIN 10 [USP'U]/ML
INJECTION, SOLUTION INTRAMUSCULAR; INTRAVENOUS
Status: DISCONTINUED
Start: 2017-06-13 | End: 2017-06-13 | Stop reason: HOSPADM

## 2017-06-13 RX ORDER — IBUPROFEN 600 MG/1
600 TABLET ORAL
Qty: 20 TAB | Refills: 0 | Status: SHIPPED | OUTPATIENT
Start: 2017-06-13 | End: 2021-11-21

## 2017-06-13 RX ORDER — ONDANSETRON 4 MG/1
4 TABLET, ORALLY DISINTEGRATING ORAL EVERY 8 HOURS PRN
Qty: 12 TAB | Refills: 0 | Status: SHIPPED | OUTPATIENT
Start: 2017-06-13 | End: 2021-11-21

## 2017-06-13 RX ORDER — OXYCODONE HYDROCHLORIDE 5 MG/1
2.5 TABLET ORAL
Status: DISCONTINUED | OUTPATIENT
Start: 2017-06-13 | End: 2017-06-13 | Stop reason: HOSPADM

## 2017-06-13 RX ORDER — HYDROMORPHONE HYDROCHLORIDE 2 MG/1
4 TABLET ORAL
Status: DISCONTINUED | OUTPATIENT
Start: 2017-06-13 | End: 2017-06-13 | Stop reason: HOSPADM

## 2017-06-13 RX ORDER — MIDAZOLAM HYDROCHLORIDE 1 MG/ML
INJECTION INTRAMUSCULAR; INTRAVENOUS
Status: DISCONTINUED
Start: 2017-06-13 | End: 2017-06-13 | Stop reason: HOSPADM

## 2017-06-13 RX ORDER — ONDANSETRON 2 MG/ML
4 INJECTION INTRAMUSCULAR; INTRAVENOUS EVERY 6 HOURS PRN
Status: DISCONTINUED | OUTPATIENT
Start: 2017-06-13 | End: 2017-06-13 | Stop reason: HOSPADM

## 2017-06-13 RX ORDER — LIDOCAINE HYDROCHLORIDE 10 MG/ML
0.5 INJECTION, SOLUTION INFILTRATION; PERINEURAL
Status: DISCONTINUED | OUTPATIENT
Start: 2017-06-13 | End: 2017-06-13 | Stop reason: HOSPADM

## 2017-06-13 RX ORDER — METHYLERGONOVINE MALEATE 0.2 MG/ML
INJECTION INTRAVENOUS
Status: DISCONTINUED
Start: 2017-06-13 | End: 2017-06-13 | Stop reason: HOSPADM

## 2017-06-13 RX ADMIN — SODIUM CHLORIDE, POTASSIUM CHLORIDE, SODIUM LACTATE AND CALCIUM CHLORIDE: 600; 310; 30; 20 INJECTION, SOLUTION INTRAVENOUS at 12:45

## 2017-06-13 ASSESSMENT — PAIN SCALES - GENERAL
PAINLEVEL_OUTOF10: 0
PAINLEVEL_OUTOF10: 0
PAINLEVEL_OUTOF10: 7
PAINLEVEL_OUTOF10: 0

## 2017-06-13 NOTE — DISCHARGE INSTRUCTIONS
ACTIVITY: Rest and take it easy for the first 24 hours.  A responsible adult is recommended to remain with you during that time.  It is normal to feel sleepy.  We encourage you to not do anything that requires balance, judgment or coordination.    MILD FLU-LIKE SYMPTOMS ARE NORMAL. YOU MAY EXPERIENCE GENERALIZED MUSCLE ACHES, THROAT IRRITATION, HEADACHE AND/OR SOME NAUSEA.    FOR 24 HOURS DO NOT:  Drive, operate machinery or run household appliances.  Drink beer or alcoholic beverages.   Make important decisions or sign legal documents.    SPECIAL INSTRUCTIONS: *PLEASE SEE INSTRUCTION SHEET.  PELVIC REST FOR SIX WEEKS - NO TAMPONS, DOUCHING OR INTERCOURSE.  **    DIET: To avoid nausea, slowly advance diet as tolerated, avoiding spicy or greasy foods for the first day.  Add more substantial food to your diet according to your physician's instructions.  Babies can be fed formula or breast milk as soon as they are hungry.  INCREASE FLUIDS AND FIBER TO AVOID CONSTIPATION.    SURGICAL DRESSING/BATHING: *OK TO SHOWER.  NO TUB BATHS, HOT TUBS OR SWIMMING**    FOLLOW-UP APPOINTMENT:  A follow-up appointment should be arranged with your doctor in **FOUR WEEKS*; call to schedule.    You should CALL YOUR PHYSICIAN if you develop:  Fever greater than 101 degrees F.  Pain not relieved by medication, or persistent nausea or vomiting.  Excessive bleeding (blood soaking through dressing) or unexpected drainage from the wound.  Extreme redness or swelling around the incision site, drainage of pus or foul smelling drainage.  Inability to urinate or empty your bladder within 8 hours.  Problems with breathing or chest pain.    You should call 911 if you develop problems with breathing or chest pain.  If you are unable to contact your doctor or surgical center, you should go to the nearest emergency room or urgent care center.    Physician's telephone #: *[phone]**    If any questions arise, call your doctor.  If your doctor is not  available, please feel free to call the Surgical Center at [phone].  The Center is open Monday through Friday from 7AM to 7PM.  You can also call the HEALTH HOTLINE open 24 hours/day, 7 days/week and speak to a nurse at [phone], or toll free at [phone].    A registered nurse may call you a few days after your surgery to see how you are doing after your procedure.    MEDICATIONS: Resume taking daily medication.  Take prescribed pain medication with food.  If no medication is prescribed, you may take non-aspirin pain medication if needed.  PAIN MEDICATION CAN BE VERY CONSTIPATING.  Take a stool softener or laxative such as senokot, pericolace, or milk of magnesia if needed.     Last pain medication given at **______________*.    If your physician has prescribed pain medication that includes Acetaminophen (Tylenol), do not take additional Acetaminophen (Tylenol) while taking the prescribed medication.    Depression / Suicide Risk    As you are discharged from this [facility], it is important to learn how to keep safe from harming yourself.    Recognize the warning signs:  · Abrupt changes in personality, positive or negative- including increase in energy   · Giving away possessions  · Change in eating patterns- significant weight changes-  positive or negative  · Change in sleeping patterns- unable to sleep or sleeping all the time   · Unwillingness or inability to communicate  · Depression  · Unusual sadness, discouragement and loneliness  · Talk of wanting to die  · Neglect of personal appearance   · Rebelliousness- reckless behavior  · Withdrawal from people/activities they love  · Confusion- inability to concentrate     If you or a loved one observes any of these behaviors or has concerns about self-harm, here's what you can do:  · Talk about it- your feelings and reasons for harming yourself  · Remove any means that you might use to hurt yourself (examples: pills, rope, extension cords,  firearm)  · Get professional help from the community (Mental Health, Substance Abuse, psychological counseling)  · Do not be alone:Call your Safe Contact- someone whom you trust who will be there for you.  · Call your local CRISIS HOTLINE 465-1536 or 657-852-4053  · Call your local Children's Mobile Crisis Response Team [place] (941) 511-4903 or www.[employer]  · Call the toll free National Suicide Prevention Hotlines   · National Suicide Prevention Lifeline 465-457-VYSR (5266)  · National Hope Line Network 800-SUICIDE (451-5120)

## 2017-06-13 NOTE — OP REPORT
DATE OF SERVICE:  2017    PREOPERATIVE DIAGNOSIS:  Inevitable miscarriage in the first trimester.    POSTOPERATIVE DIAGNOSIS:  Inevitable miscarriage in the first trimester.    SURGEON:  Pawan Brooks MD    ASSISTANT:  None.    PROCEDURE PERFORMED:  Dilatation and evacuation of the uterus.    SPECIMENS:  Products of conception sent to pathology for review.    ANESTHESIA:  General.    ANESTHESIOLOGIST:  Codey Cheng MD.    ESTIMATED BLOOD LOSS:  Minimal.    DRAINS:  Straight catheter to the bladder.    COMPLICATIONS:  None.    INDICATIONS:  This patient is a 21-year-old  female  3,   para 2, in the first trimester pregnancy, who presents to the emergency   department bleeding and cramping.  Ultrasound shows a gestational sac   containing yolk sac in the lower uterine segment.  No fetal pole was noted.    On physical examination, cervix is dilated to 1 cm and the patient is having   copious bleeding.    FINDINGS:  Moderate products of conception sent to pathology for review.  A   6-8 week size uterus.    DESCRIPTION OF PROCEDURE:  After adequately being counseled, the patient was   taken to the operating room and placed in a supine position.  General   anesthesia was induced.  Patient was placed in high lithotomy using candy cane   stirrups.  Vagina and perineum were prepped and draped in the usual sterile   fashion.  Straight catheter used to drain the bladder.  Weighted speculum was   placed in the posterior vaginal fornix.  Single tooth tenaculum placed on   anterior lip of the cervix.  Cervix was dilated to 20-Icelandic using Hanks   dilators and a 7 mm curved plastic suction curet inserted into the uterus and   the products of conception were aspirated and sent to pathology for review.    The patient was given Methergine 0.2 mg intramuscularly prior to starting the   procedure and she was given Ancef 2 g intravenously prior to starting the   procedure.  The patient was awakened,  extubated and taken to recovery room in   good condition.  Patient is Rh positive and RhoGAM is not indicated.  The   patient will follow up with me in 4 weeks for postoperative checkup.       ____________________________________     MD GERARDO MOON / TYE    DD:  06/13/2017 14:03:13  DT:  06/13/2017 16:41:23    D#:  [id number]  Job#:  [id number]

## 2017-06-13 NOTE — IP AVS SNAPSHOT
6/13/2017    Ines Friedman  [address]  [place] [address]    Dear Ines:    [facility] wants to ensure your discharge home is safe and you or your loved ones have had all of your questions answered regarding your care after you leave the hospital.    Below is a list of resources and contact information should you have any questions regarding your hospital stay, follow-up instructions, or active medical symptoms.    Questions or Concerns Regarding… Contact   Medical Questions Related to Your Discharge  (7 days a week, 8am-5pm) Contact a Nurse Care Coordinator   [phone]   Medical Questions Not Related to Your Discharge  (24 hours a day / 7 days a week)  Contact the Nurse Health Line   [phone]    Medications or Discharge Instructions Refer to your discharge packet   or contact your [facility] Primary Care Provider   [phone]   Follow-up Appointment(s) Schedule your appointment via [employer]   or contact Scheduling [phone]   Billing Review your statement via [employer]  or contact Billing [phone]   Medical Records Review your records via [employer]   or contact Medical Records [phone]     You may receive a telephone call within two days of discharge. This call is to make certain you understand your discharge instructions and have the opportunity to have any questions answered. You can also easily access your medical information, test results and upcoming appointments via the [employer] free online health management tool. You can learn more and sign up at [employer]. For assistance setting up your [employer] account, please call [phone].    Once again, we want to ensure your discharge home is safe and that you have a clear understanding of any next steps in your care. If you have any questions or concerns, please do not hesitate to contact us, we are here for you. Thank you for choosing [facility] for your healthcare needs.    Sincerely,    Your [facility] Healthcare Team

## 2017-06-13 NOTE — OR NURSING
RECEIVED FROM OR WITH DR CASILLAS.  LMA IN PLACE.  VSS.  CHARLENE PAD IN PLACE.  ABDOMEN SOFT  1415 AIRWAY DC'D WITHOUT PROBLEM.  SLEEPY  1430 AWAKE.  GIVEN PHONE, JUICE AND CRACKERS.  1500 SLEEPING  1510 PATIENT REQUESTS TO GET UP AND DRESSED.  NO BLEEDING NOTED.  DENIES PAIN/NAUSEA.     1530 FRIEND DOWNSTAIRS.  PATIENT TAKEN IN A WHEELCHAIR TO MEET FRIEND AND INSTRUCTIONS GIVEN TO PATIENT AND FRIEND.

## 2017-06-13 NOTE — ED NOTES
"Chief Complaint   Patient presents with   • Vaginal Bleeding     pt reports that she was seen here yesterday, it was discovered that she was pregnant. eloped prior to US. last night developed vaginal bleeding similar to a normal period. reports that pain was not resolved yesterday.      Blood pressure 108/72, pulse 86, temperature 36.6 °C (97.9 °F), temperature source Temporal, resp. rate 16, height 1.626 m (5' 4\"), weight 53.6 kg (118 lb 2.7 oz), last menstrual period 02/28/2016, SpO2 100 %, unknown if currently breastfeeding.  Pt informed of wait times. Educated on triage process.  Asked to return to triage RN for any new or worsening of symptoms. Thanked for patience.        "

## 2017-06-13 NOTE — H&P
DATE OF SURGERY:  2017    CHIEF COMPLAINT:  Vaginal bleeding and lower abdominal cramps.    HISTORY OF PRESENT ILLNESS:  This patient is a 21-year-old black female    3, para 2, currently in the first trimester of pregnancy.  Patient   presents complaining of vaginal bleeding and lower abdominal cramps, which   started last night and progressively getting worse with increased vaginal   bleeding.  The patient has a transvaginal ultrasound, which shows a small   gestational sac containing a yolk sac, but no fetal pole present, lower   uterine segment consistent with early first trimester miscarriage.  The   patient denies abdominal pain or other symptoms.    PAST MEDICAL HISTORY:  Negative.    PAST SURGICAL HISTORY:  Significant for  section x2.    SOCIAL HISTORY:  Tobacco and alcohol use, negative.    ALLERGIES:  No known drug allergies.    PHYSICAL EXAMINATION:  VITAL SIGNS:  On admission, vital signs stable.  Patient is afebrile.  GENERAL:  This patient is a well-developed, well-nourished female, in no   apparent distress.  HEENT:  Within normal limits.  CARDIOVASCULAR:  Regular rate and rhythm, normal S1, S2, without murmurs or   gallops.  LUNGS:  Clear to auscultation bilaterally.  ABDOMEN:  Soft, nontender, without masses or rebound tenderness.  No   organomegaly.  PELVIC:  External genitalia without visible lesions.  Vagina is positive for   blood.  Cervix is 1 cm dilated with blood coming from the cervical os.  Adnexa   without masses or tenderness.  Uterus is slightly tender and enlarged 6   weeks' size.  EXTREMITIES:  Without cyanosis, clubbing, or edema.  NEUROLOGIC:  Grossly intact.    LABORATORY EVALUATION:  Transvaginal ultrasound shows gestational sac in the   lower uterine segment containing a yolk sac, but no fetal pole.  H and H is   12.0 and 38.1.  Blood type is O positive.    IMPRESSION:  First trimester inevitable miscarriage.    PLAN:  Admission for dilatation and evacuation  of the uterus.    The patient has been counseled regarding the risks, benefits, and alternatives   of the procedure.  We discussed the risks of expected management versus   surgical intervention.  The risks of pain, bleeding, infection, perforation of   the uterus, and possible blood transfusion were discussed.  The patient   agreed to surgery as noted.       ____________________________________     MD GERARDO MOON / TYE    DD:  06/13/2017 11:57:21  DT:  06/13/2017 12:17:44    D#:  [id number]  Job#:  [id number]

## 2017-06-13 NOTE — IP AVS SNAPSHOT
" Home Care Instructions                                                                                                                Name:Ines Friedman  Medical Record Number:[id number]  CSN: [id number]    YOB: 1995   Age: 21 y.o.  Sex: female  HT:1.626 m (5' 4\") WT: 53.6 kg (118 lb 2.7 oz)          Admit Date: 6/13/2017     Discharge Date:   Today's Date: 6/13/2017  Attending Doctor:  No att. providers found                  Allergies:  Review of patient's allergies indicates no known allergies.                Discharge Instructions           ACTIVITY: Rest and take it easy for the first 24 hours.  A responsible adult is recommended to remain with you during that time.  It is normal to feel sleepy.  We encourage you to not do anything that requires balance, judgment or coordination.    MILD FLU-LIKE SYMPTOMS ARE NORMAL. YOU MAY EXPERIENCE GENERALIZED MUSCLE ACHES, THROAT IRRITATION, HEADACHE AND/OR SOME NAUSEA.    FOR 24 HOURS DO NOT:  Drive, operate machinery or run household appliances.  Drink beer or alcoholic beverages.   Make important decisions or sign legal documents.    SPECIAL INSTRUCTIONS: *PLEASE SEE INSTRUCTION SHEET.  PELVIC REST FOR SIX WEEKS - NO TAMPONS, DOUCHING OR INTERCOURSE.  **    DIET: To avoid nausea, slowly advance diet as tolerated, avoiding spicy or greasy foods for the first day.  Add more substantial food to your diet according to your physician's instructions.  Babies can be fed formula or breast milk as soon as they are hungry.  INCREASE FLUIDS AND FIBER TO AVOID CONSTIPATION.    SURGICAL DRESSING/BATHING: *OK TO SHOWER.  NO TUB BATHS, HOT TUBS OR SWIMMING**    FOLLOW-UP APPOINTMENT:  A follow-up appointment should be arranged with your doctor in **FOUR WEEKS*; call to schedule.    You should CALL YOUR PHYSICIAN if you develop:  Fever greater than 101 degrees F.  Pain not relieved by medication, or persistent nausea or vomiting.  Excessive bleeding (blood soaking " through dressing) or unexpected drainage from the wound.  Extreme redness or swelling around the incision site, drainage of pus or foul smelling drainage.  Inability to urinate or empty your bladder within 8 hours.  Problems with breathing or chest pain.    You should call 911 if you develop problems with breathing or chest pain.  If you are unable to contact your doctor or surgical center, you should go to the nearest emergency room or urgent care center.    Physician's telephone #: *[phone]**    If any questions arise, call your doctor.  If your doctor is not available, please feel free to call the Surgical Center at [phone].  The Center is open Monday through Friday from 7AM to 7PM.  You can also call the [employer] HOTLINE open 24 hours/day, 7 days/week and speak to a nurse at [phone], or toll free at [phone].    A registered nurse may call you a few days after your surgery to see how you are doing after your procedure.    MEDICATIONS: Resume taking daily medication.  Take prescribed pain medication with food.  If no medication is prescribed, you may take non-aspirin pain medication if needed.  PAIN MEDICATION CAN BE VERY CONSTIPATING.  Take a stool softener or laxative such as senokot, pericolace, or milk of magnesia if needed.     Last pain medication given at **______________*.    If your physician has prescribed pain medication that includes Acetaminophen (Tylenol), do not take additional Acetaminophen (Tylenol) while taking the prescribed medication.    Depression / Suicide Risk    As you are discharged from this [facility] facility, it is important to learn how to keep safe from harming yourself.    Recognize the warning signs:  · Abrupt changes in personality, positive or negative- including increase in energy   · Giving away possessions  · Change in eating patterns- significant weight changes-  positive or negative  · Change in sleeping patterns- unable to sleep or sleeping all the  time   · Unwillingness or inability to communicate  · Depression  · Unusual sadness, discouragement and loneliness  · Talk of wanting to die  · Neglect of personal appearance   · Rebelliousness- reckless behavior  · Withdrawal from people/activities they love  · Confusion- inability to concentrate     If you or a loved one observes any of these behaviors or has concerns about self-harm, here's what you can do:  · Talk about it- your feelings and reasons for harming yourself  · Remove any means that you might use to hurt yourself (examples: pills, rope, extension cords, firearm)  · Get professional help from the community (Mental Health, Substance Abuse, psychological counseling)  · Do not be alone:Call your Safe Contact- someone whom you trust who will be there for you.  · Call your local CRISIS HOTLINE 629-7475 or 433-170-5884  · Call your local Children's Mobile Crisis Response Team [place] (459) 530-0515 or www.[employer] (air taxi)  · Call the toll free National Suicide Prevention Hotlines   · National Suicide Prevention Lifeline 155-335-SBRS (9756)  · [place] Hope Line Network 800-SUICIDE (942-9312)       Medication List      START taking these medications        Instructions    Morning Afternoon Evening Bedtime    ibuprofen 600 MG Tabs   Commonly known as:  MOTRIN        Take 1 Tab by mouth 3 times a day, with meals. As needed for pain.   Dose:  600 mg                        ondansetron 4 MG Tbdp   Commonly known as:  ZOFRAN ODT        Take 1 Tab by mouth every 8 hours as needed.   Dose:  4 mg                             Where to Get Your Medications      You can get these medications from any pharmacy     Bring a paper prescription for each of these medications    - ibuprofen 600 MG Tabs  - ondansetron 4 MG Tbdp            Medication Information     Above and/or attached are the medications your physician expects you to take upon discharge. Review all of your home medications and newly ordered medications  with your doctor and/or pharmacist. Follow medication instructions as directed by your doctor and/or pharmacist. Please keep your medication list with you and share with your physician. Update the information when medications are discontinued, doses are changed, or new medications (including over-the-counter products) are added; and carry medication information at all times in the event of emergency situations.        Resources     Quit Smoking / Tobacco Use:    I understand the use of any tobacco products increases my chance of suffering from future heart disease or stroke and could cause other illnesses which may shorten my life. Quitting the use of tobacco products is the single most important thing I can do to improve my health. For further information on smoking / tobacco cessation call a Toll Free Quit Line at 1-793.161.3562 (*[employer] [place]) or 1-524.686.9241 (American Lung Association) or you can access the web based program at www.lungusa.org.    [place] Tobacco Users Help Line:  (195) 543-2558       Toll Free: 1-861.490.8511  Quit Tobacco Program [facility] (689)767-6071    Crisis Hotline:    [place] Crisis Hotline:  8-869-EZNMMLF or 1-878.992.4546    [place] Crisis Hotline:    1-144.787.7874 or 123-020-8303    Discharge Survey:   Thank you for choosing [facility]. We hope we did everything we could to make your hospital stay a pleasant one. You may be receiving a survey and we would appreciate your time and participation in answering the questions. Your input is very valuable to us in our efforts to improve our service to our patients and their families.            Signatures     My signature on this form indicates that:    1. I acknowledge receipt and understanding of these Home Care Instruction.  2. My questions regarding this information have been answered to my satisfaction.  3. I have formulated a plan with my discharge nurse to obtain my prescribed medications for  home.    __________________________________      __________________________________                   Patient Signature                                 Guardian/Responsible Adult Signature      __________________________________                 __________       ________                       Nurse Signature                                               Date                 Time

## 2017-06-13 NOTE — OR NURSING
[id number] RECEIVED PT FROM OR WITH DR. CASILLAS, LMA IN PLACE,  VSS BREATHING EVEN AND UNLABORED. CHARLENE PAD IN PLACE- DRY. ABDOMEN SOFT.  *** AIRWAY REMOVED WITHOUT DIFFICULTY.

## 2017-06-13 NOTE — ED PROVIDER NOTES
"ED Provider Note    CHIEF COMPLAINT  Vaginal Bleeding    HPI  Ines Friedman is a 21 y.o.  at possibly 4 weeks by dates although these are unclear who presents with vaginal bleeding. She was seen yesterday for some stomach pains. She describes a crampy discomfort across her lower abdomen. It feels like \"period Cramps.\" Nothing makes it better, nothing makes it worse. It hurts more on the inside, touching does not really change it. She was seen yesterday, and had a positive pregnancy test, however she left prior to the workup being undertaken. Beginning last night she started having vaginal bleeding. For this reason she presents here. She had had a tampon. She points out this was soaked with blood. She denies any fever or chills. No chest pain or shortness of breath. No extremity swelling. No change in bowel or bladder. No vaginal discharge at all aside from the blood. No syncope or or near-syncope. No dizziness or lightheadedness. There is no other complaint.    PAST MEDICAL HISTORY  Past Medical History   Diagnosis Date   • Seizure disorder (CMS-HCC)      once       SOCIAL HISTORY  Social History   Substance Use Topics   • Smoking status: Current Every Day Smoker -- 0.50 packs/day     Types: Cigarettes   • Smokeless tobacco: Never Used   • Alcohol Use: No       Here by herself.    SURGICAL HISTORY  Past Surgical History   Procedure Laterality Date   • Primary c section  2015     Procedure: PRIMARY C SECTION;  Surgeon: Jenni Worrell M.D.;  Location: LABOR AND DELIVERY;  Service:        CURRENT MEDICATIONS    I have reviewed the nurses notes and/or the list brought with the patient.    ALLERGIES  No Known Allergies    REVIEW OF SYSTEMS  See HPI for further details. Review of systems as above, otherwise all other systems are negative.     PHYSICAL EXAM  VITAL SIGNS: /72 mmHg  Pulse 86  Temp(Src) 36.6 °C (97.9 °F) (Temporal)  Resp 16  Ht 1.626 m (5' 4\")  Wt 53.6 kg (118 lb 2.7 oz)  BMI " 20.27 kg/m2  SpO2 100%  LMP 2016  Constitutional: Well appearing patient in no acute distress.  Not toxic, nor ill in appearance.  HENT: Mucus membranes moist.   Eyes: Pupils equally round.  No scleral icterus.   Cardiovascular: Regular heart rate and rhythm.  No murmurs, rubs, nor gallop appreciated.   Thorax & Lungs: Lungs are clear to auscultation with good air movement bilaterally.  No wheeze or other adventitious breath sounds.   Abdomen: Soft, with no tenderness, rebound nor guarding.  No mass, pulsatile mass, nor hepatosplenomegaly appreciated.  No CVA tenderness.  :  Nurse chaperone. EGBUS normal.  Vagina blood in the vault.  Cervix tender although not erythematous; it is soft and partially open.  Adnexa: Without mass or tenderness.  Skin: No purpura nor petechia noted.  Extremities/Musculoskeletal: No sign of trauma.   Neurologic: Alert & oriented.  Strength and sensation grossly intact all around.  Gait is normal.  Psychiatric: Affect appropriate for the clinical situation.    LABS  Laboratories initially reviewed. Urinalysis was normal. She is Rh+ from previous labs.   Labs Reviewed   HCG QUANTITATIVE - Abnormal; Notable for the following:     Bhcg 566.9 (*)     All other components within normal limits   CBC WITH DIFFERENTIAL - Abnormal; Notable for the following:     MCH 26.3 (*)     MCHC 31.5 (*)     All other components within normal limits   WET PREP   CHLAMYDIA/GC PCR URINE OR SWAB       GC/Chlamydia sent and pending although I do not think it is the etiology of her symptoms.    RADIOLOGY/PROCEDURES  I have reviewed the patient's film interpretation myself, and it is read out as:    US-OB PELVIS TRANSVAGINAL   Final Result      5 week 1 day gestational sac within the lower uterine segment consistent with impending .            MEDICAL RECORD  I have reviewed patient's medical record and pertinent results are listed above.    COURSE & MEDICAL DECISION MAKING  I have reviewed any  medical record information, laboratory studies and radiographic results as noted above.  This patient comes in with vaginal bleeding and pain. Her examination is suspicious for miscarriage. Ultrasound does also look concerning showing a 5 week gestational sac at the lower uterine segment. Combining this with the beta 500, I suspect this is a miscarriage in process/incomplete . I discussed with the patient the results of her testing. Anticipating for discharge home. I did however and discussed the patient's case with Dr. Brooks, who wondered if it might be worthwhile to do a completion D&C. Patient has been n.p.o. since last night. Discussed this with the patient, she is agreeable to either approach, conservative versus surgical. Dr. Brooks will review her chart, and will affect disposition.      FINAL IMPRESSION  1. Miscarriage     2. Suspect inevitable        This dictation was created using voice recognition software.    Electronically signed by: Isaiah Leblanc, 2017 9:42 AM

## 2017-10-28 ENCOUNTER — APPOINTMENT (OUTPATIENT)
Dept: RADIOLOGY | Facility: MEDICAL CENTER | Age: 22
End: 2017-10-28
Attending: EMERGENCY MEDICINE
Payer: MEDICAID

## 2017-10-28 ENCOUNTER — HOSPITAL ENCOUNTER (EMERGENCY)
Facility: MEDICAL CENTER | Age: 22
End: 2017-10-28
Attending: EMERGENCY MEDICINE
Payer: MEDICAID

## 2017-10-28 VITALS
OXYGEN SATURATION: 100 % | BODY MASS INDEX: 19.99 KG/M2 | WEIGHT: 117.06 LBS | HEIGHT: 64 IN | RESPIRATION RATE: 18 BRPM | TEMPERATURE: 97.6 F | HEART RATE: 79 BPM | DIASTOLIC BLOOD PRESSURE: 72 MMHG | SYSTOLIC BLOOD PRESSURE: 122 MMHG

## 2017-10-28 DIAGNOSIS — Z32.01 PREGNANCY EXAMINATION OR TEST, POSITIVE RESULT: ICD-10-CM

## 2017-10-28 DIAGNOSIS — R10.32 LEFT LOWER QUADRANT PAIN: ICD-10-CM

## 2017-10-28 DIAGNOSIS — R19.7 DIARRHEA, UNSPECIFIED TYPE: ICD-10-CM

## 2017-10-28 LAB
ALBUMIN SERPL BCP-MCNC: 4.4 G/DL (ref 3.2–4.9)
ALBUMIN/GLOB SERPL: 1.7 G/DL
ALP SERPL-CCNC: 45 U/L (ref 30–99)
ALT SERPL-CCNC: 8 U/L (ref 2–50)
ANION GAP SERPL CALC-SCNC: 5 MMOL/L (ref 0–11.9)
APTT PPP: 26.5 SEC (ref 24.7–36)
AST SERPL-CCNC: 15 U/L (ref 12–45)
B-HCG SERPL-ACNC: 6726 MIU/ML (ref 0–5)
BASOPHILS # BLD AUTO: 0.4 % (ref 0–1.8)
BASOPHILS # BLD: 0.02 K/UL (ref 0–0.12)
BILIRUB SERPL-MCNC: 0.2 MG/DL (ref 0.1–1.5)
BUN SERPL-MCNC: 11 MG/DL (ref 8–22)
CALCIUM SERPL-MCNC: 9.2 MG/DL (ref 8.5–10.5)
CHLORIDE SERPL-SCNC: 112 MMOL/L (ref 96–112)
CO2 SERPL-SCNC: 22 MMOL/L (ref 20–33)
CREAT SERPL-MCNC: 0.81 MG/DL (ref 0.5–1.4)
EOSINOPHIL # BLD AUTO: 0.07 K/UL (ref 0–0.51)
EOSINOPHIL NFR BLD: 1.3 % (ref 0–6.9)
ERYTHROCYTE [DISTWIDTH] IN BLOOD BY AUTOMATED COUNT: 50.4 FL (ref 35.9–50)
GFR SERPL CREATININE-BSD FRML MDRD: >60 ML/MIN/1.73 M 2
GLOBULIN SER CALC-MCNC: 2.6 G/DL (ref 1.9–3.5)
GLUCOSE SERPL-MCNC: 96 MG/DL (ref 65–99)
HCG SERPL QL: POSITIVE
HCT VFR BLD AUTO: 36.7 % (ref 37–47)
HGB BLD-MCNC: 11.3 G/DL (ref 12–16)
IMM GRANULOCYTES # BLD AUTO: 0.02 K/UL (ref 0–0.11)
IMM GRANULOCYTES NFR BLD AUTO: 0.4 % (ref 0–0.9)
INR PPP: 1.1 (ref 0.87–1.13)
LIPASE SERPL-CCNC: 141 U/L (ref 11–82)
LYMPHOCYTES # BLD AUTO: 1.51 K/UL (ref 1–4.8)
LYMPHOCYTES NFR BLD: 28.8 % (ref 22–41)
MCH RBC QN AUTO: 25.9 PG (ref 27–33)
MCHC RBC AUTO-ENTMCNC: 30.8 G/DL (ref 33.6–35)
MCV RBC AUTO: 84.2 FL (ref 81.4–97.8)
MONOCYTES # BLD AUTO: 0.19 K/UL (ref 0–0.85)
MONOCYTES NFR BLD AUTO: 3.6 % (ref 0–13.4)
NEUTROPHILS # BLD AUTO: 3.44 K/UL (ref 2–7.15)
NEUTROPHILS NFR BLD: 65.5 % (ref 44–72)
NRBC # BLD AUTO: 0 K/UL
NRBC BLD AUTO-RTO: 0 /100 WBC
NUMBER OF RH DOSES IND 8505RD: NORMAL
PLATELET # BLD AUTO: 185 K/UL (ref 164–446)
PMV BLD AUTO: 10.4 FL (ref 9–12.9)
POTASSIUM SERPL-SCNC: 3.4 MMOL/L (ref 3.6–5.5)
PROT SERPL-MCNC: 7 G/DL (ref 6–8.2)
PROTHROMBIN TIME: 13.9 SEC (ref 12–14.6)
RBC # BLD AUTO: 4.36 M/UL (ref 4.2–5.4)
RH BLD: NORMAL
SODIUM SERPL-SCNC: 139 MMOL/L (ref 135–145)
WBC # BLD AUTO: 5.3 K/UL (ref 4.8–10.8)

## 2017-10-28 PROCEDURE — 80053 COMPREHEN METABOLIC PANEL: CPT

## 2017-10-28 PROCEDURE — 84703 CHORIONIC GONADOTROPIN ASSAY: CPT

## 2017-10-28 PROCEDURE — 84702 CHORIONIC GONADOTROPIN TEST: CPT

## 2017-10-28 PROCEDURE — 85025 COMPLETE CBC W/AUTO DIFF WBC: CPT

## 2017-10-28 PROCEDURE — 99284 EMERGENCY DEPT VISIT MOD MDM: CPT

## 2017-10-28 PROCEDURE — 36415 COLL VENOUS BLD VENIPUNCTURE: CPT

## 2017-10-28 PROCEDURE — 85730 THROMBOPLASTIN TIME PARTIAL: CPT

## 2017-10-28 PROCEDURE — 85610 PROTHROMBIN TIME: CPT

## 2017-10-28 PROCEDURE — 86901 BLOOD TYPING SEROLOGIC RH(D): CPT

## 2017-10-28 PROCEDURE — 83690 ASSAY OF LIPASE: CPT

## 2017-10-28 ASSESSMENT — PAIN SCALES - GENERAL: PAINLEVEL_OUTOF10: 7

## 2017-10-28 NOTE — DISCHARGE INSTRUCTIONS
Abdominal Pain, Adult  Many things can cause abdominal pain. Usually, abdominal pain is not caused by a disease and will improve without treatment. It can often be observed and treated at home. Your health care provider will do a physical exam and possibly order blood tests and X-rays to help determine the seriousness of your pain. However, in many cases, more time must pass before a clear cause of the pain can be found. Before that point, your health care provider may not know if you need more testing or further treatment.  HOME CARE INSTRUCTIONS   Monitor your abdominal pain for any changes. The following actions may help to alleviate any discomfort you are experiencing:  · Only take over-the-counter or prescription medicines as directed by your health care provider.  · Do not take laxatives unless directed to do so by your health care provider.  · Try a clear liquid diet (broth, tea, or water) as directed by your health care provider. Slowly move to a bland diet as tolerated.  SEEK MEDICAL CARE IF:  · You have unexplained abdominal pain.  · You have abdominal pain associated with nausea or diarrhea.  · You have pain when you urinate or have a bowel movement.  · You experience abdominal pain that wakes you in the night.  · You have abdominal pain that is worsened or improved by eating food.  · You have abdominal pain that is worsened with eating fatty foods.  · You have a fever.  SEEK IMMEDIATE MEDICAL CARE IF:   · Your pain does not go away within 2 hours.  · You keep throwing up (vomiting).  · Your pain is felt only in portions of the abdomen, such as the right side or the left lower portion of the abdomen.  · You pass bloody or black tarry stools.  MAKE SURE YOU:  · Understand these instructions.    · Will watch your condition.    · Will get help right away if you are not doing well or get worse.       This information is not intended to replace advice given to you by your health care provider. Make sure you  discuss any questions you have with your health care provider.     Document Released: 09/27/2006 Document Revised: 01/08/2016 Document Reviewed: 08/27/2014  [employer] Patient Education ©2016 [employer]  Pregnancy  If you are planning on getting pregnant, it is a good idea to make a preconception appointment with your caregiver to discuss having a healthy lifestyle before getting pregnant. This includes diet, weight, exercise, taking prenatal vitamins (especially folic acid, which helps prevent brain and spinal cord defects), avoiding alcohol, smoking and illegal drugs, medical problems (diabetes, convulsions), family history of genetic problems, working conditions, and immunizations. It is better to have knowledge of these things and do something about them before getting pregnant.  During your pregnancy, it is important to follow certain guidelines in order to have a healthy baby. It is very important to get good prenatal care and follow your caregiver's instructions. Prenatal care includes all the medical care you receive before your baby's birth. This helps to prevent problems during the pregnancy and childbirth.  HOME CARE INSTRUCTIONS   · Start your prenatal visits by the 12th week of pregnancy or earlier, if possible. At first, appointments are usually scheduled monthly. They become more frequent in the last 2 months before delivery. It is important that you keep your caregiver's appointments and follow your caregiver's instructions regarding medication use, exercise, and diet.  · During pregnancy, you are providing food for you and your baby. Eat a regular, well-balanced diet. Choose foods such as meat, fish, milk and other dairy products, vegetables, fruits, whole-grain breads and cereals. Your caregiver will inform you of the ideal weight gain depending on your current height and weight. Drink lots of liquids. Try to drink 8 glasses of water a day.  · Alcohol is associated with a number of  birth defects including fetal alcohol syndrome. It is best to avoid alcohol completely. Smoking will cause low birth rate and prematurity. Use of alcohol and nicotine during your pregnancy also increases the chances that your child will be chemically dependent later in their life and may contribute to SIDS (Sudden Infant Death Syndrome).  · Do not use illegal drugs.  · Only take prescription or over-the-counter medications that are recommended by your caregiver. Other medications can cause genetic and physical problems in the baby.  · Morning sickness can often be helped by keeping soda crackers at the bedside. Eat a few before getting up in the morning.  · A sexual relationship may be continued until near the end of pregnancy if there are no other problems such as early (premature) leaking of amniotic fluid from the membranes, vaginal bleeding, painful intercourse or belly (abdominal) pain.  · Exercise regularly. Check with your caregiver if you are unsure of the safety of some of your exercises.  · Do not use hot tubs, steam rooms or saunas. These increase the risk of fainting and hurting yourself and the baby. Swimming is OK for exercise. Get plenty of rest, including afternoon naps when possible, especially in the third trimester.  · Avoid toxic odors and chemicals.  · Do not wear high heels. They may cause you to lose your balance and fall.  · Do not lift over 5 pounds. If you do lift anything, lift with your legs and thighs, not your back.  · Avoid long trips, especially in the third trimester.  · If you have to travel out of the city or state, take a copy of your medical records with you.  SEEK IMMEDIATE MEDICAL CARE IF:   · You develop an unexplained oral temperature above 102° F (38.9° C), or as your caregiver suggests.  · You have leaking of fluid from the vagina. If leaking membranes are suspected, take your temperature and inform your caregiver of this when you call.  · There is vaginal spotting or  bleeding. Notify your caregiver of the amount and how many pads are used.  · You continue to feel sick to your stomach (nauseous) and have no relief from remedies suggested, or you throw up (vomit) blood or coffee ground like materials.  · You develop upper abdominal pain.  · You have round ligament discomfort in the lower abdominal area. This still must be evaluated by your caregiver.  · You feel contractions of the uterus.  · You do not feel the baby move, or there is less movement than before.  · You have painful urination.  · You have abnormal vaginal discharge.  · You have persistent diarrhea.  · You get a severe headache.  · You have problems with your vision.  · You develop muscle weakness.  · You feel dizzy and faint.  · You develop shortness of breath.  · You develop chest pain.  · You have back pain that travels down to your leg and feet.  · You feel irregular or a very fast heartbeat.  · You develop excessive weight gain in a short period of time (5 pounds in 3 to 5 days).  · You are involved in a domestic violence situation.  Document Released: 12/18/2006 Document Revised: 06/18/2013 Document Reviewed: 06/11/2010  [employer]® Patient Information ©2014 [employer], [place].

## 2017-10-28 NOTE — ED PROVIDER NOTES
CHIEF COMPLAINT  Chief Complaint   Patient presents with   • Nausea   • Abdominal Cramping     x 1 day   • Bloody Stools     dark blood x 1.        HPI (1,4)  Ines Friedman is a 22 y.o. female who is [id number] presents with one episode of bloody stools this morning.  Patient states she woke up thi morning at 6.00 am with nausea and one episode of vomiting associated with abdominal cramps lasting for 5 mins followed by a bowel movement -diarrhea with streaks of blood on the commode and on the tissue when she wiped. She is unsure if it is from vagina/anus.  Patient reports of nausea and vomiting for the past 2 days and every day she had one episode of vomiting.  Denies abdominal pain/hemetemesis.  Reports of chronic constipation and moves her bowels every 3 days.  Patient had one recent miscarriage in  of this year.      REVIEW OF SYSTEMS(2/10)  Pertinent positives include: nausea, vomiting, abdominal cramps, bloody stool.  Pertinent negatives include: Patient denies fever,chills, dizziness, chest pain, cough, SOB,lower extremities swelling,hemetemesis,abdominal pain.   All other systems are negative.     PAST MEDICAL HISTORY(PFS1,2)  Past Medical History:   Diagnosis Date   • Seizure disorder (CMS-HCC)     once       FAMILY HISTORY  No family history on file.    SOCIAL HISTORY  Social History   Substance Use Topics   • Smoking status: Current Every Day Smoker     Packs/day: 0.50     Types: Cigarettes   • Smokeless tobacco: Never Used   • Alcohol use No     History   Drug Use No       SURGICAL HISTORY  Past Surgical History:   Procedure Laterality Date   • DILATION AND EVACUATION N/A 2017    Procedure: DILATION AND EVACUATION;  Surgeon: Pawan Brooks M.D.;  Location: SURGERY SAME DAY [facility];  Service:    • PRIMARY C SECTION  2015    Procedure: PRIMARY C SECTION;  Surgeon: Jenni Worrell M.D.;  Location: LABOR AND DELIVERY;  Service:        CURRENT MEDICATIONS  The patient  "denies    ALLERGIES  No Known Allergies    PHYSICAL EXAM (2,8)  VITAL SIGNS: /72   Pulse 79   Temp 36.4 °C (97.6 °F) (Oral)   Resp 18   Ht 1.626 m (5' 4\")   Wt 53.1 kg (117 lb 1 oz)   LMP 02/28/2016   SpO2 100%   BMI 20.09 kg/m²  Reviewed and patient is hemodynamically stable, afebrile  Constitutional: Afebrile  HENT: Atraumatic, normocephalus, moist mucous membranes  Eyes: Pupils equal and reactive to light with accomodation  Cardiovascular: S1 and S2 heard , no murmurs, no gallop or rub, regular rate rhythm  Respiratory: clear to auscultation, no wheezing, Bilateral normal breath sounds  Gastrointestinal: Soft, Tenderness on left mid quadrant, Normal bowel sounds.  Skin: warm and dry, no bruises/rashes/erythema/swelling  Neurological: Oriented to time, place, and person .Cranial nerves intact. No motor/sensory deficits.Reflexes were normal and symmetrical in both upper and lower extremities  Musculoskeletal : NROM of all extremities. No tenderness or deformity noted.   Psychiatric: normal mood, no depression or anxiety        RADIOLOGY/PROCEDURES  Ordered USG of abdomen    LABORATORY: Reviewed as below.  Results for orders placed or performed during the hospital encounter of 10/28/17   CBC WITH DIFFERENTIAL   Result Value Ref Range    WBC 5.3 4.8 - 10.8 K/uL    RBC 4.36 4.20 - 5.40 M/uL    Hemoglobin 11.3 (L) 12.0 - 16.0 g/dL    Hematocrit 36.7 (L) 37.0 - 47.0 %    MCV 84.2 81.4 - 97.8 fL    MCH 25.9 (L) 27.0 - 33.0 pg    MCHC 30.8 (L) 33.6 - 35.0 g/dL    RDW 50.4 (H) 35.9 - 50.0 fL    Platelet Count 185 164 - 446 K/uL    MPV 10.4 9.0 - 12.9 fL    Neutrophils-Polys 65.50 44.00 - 72.00 %    Lymphocytes 28.80 22.00 - 41.00 %    Monocytes 3.60 0.00 - 13.40 %    Eosinophils 1.30 0.00 - 6.90 %    Basophils 0.40 0.00 - 1.80 %    Immature Granulocytes 0.40 0.00 - 0.90 %    Nucleated RBC 0.00 /100 WBC    Neutrophils (Absolute) 3.44 2.00 - 7.15 K/uL    Lymphs (Absolute) 1.51 1.00 - 4.80 K/uL    Monos " (Absolute) 0.19 0.00 - 0.85 K/uL    Eos (Absolute) 0.07 0.00 - 0.51 K/uL    Baso (Absolute) 0.02 0.00 - 0.12 K/uL    Immature Granulocytes (abs) 0.02 0.00 - 0.11 K/uL    NRBC (Absolute) 0.00 K/uL   COMP METABOLIC PANEL   Result Value Ref Range    Sodium 139 135 - 145 mmol/L    Potassium 3.4 (L) 3.6 - 5.5 mmol/L    Chloride 112 96 - 112 mmol/L    Co2 22 20 - 33 mmol/L    Anion Gap 5.0 0.0 - 11.9    Glucose 96 65 - 99 mg/dL    Bun 11 8 - 22 mg/dL    Creatinine 0.81 0.50 - 1.40 mg/dL    Calcium 9.2 8.5 - 10.5 mg/dL    AST(SGOT) 15 12 - 45 U/L    ALT(SGPT) 8 2 - 50 U/L    Alkaline Phosphatase 45 30 - 99 U/L    Total Bilirubin 0.2 0.1 - 1.5 mg/dL    Albumin 4.4 3.2 - 4.9 g/dL    Total Protein 7.0 6.0 - 8.2 g/dL    Globulin 2.6 1.9 - 3.5 g/dL    A-G Ratio 1.7 g/dL   LIPASE   Result Value Ref Range    Lipase 141 (H) 11 - 82 U/L   PROTHROMBIN TIME (INR)   Result Value Ref Range    PT 13.9 12.0 - 14.6 sec    INR 1.10 0.87 - 1.13   APTT   Result Value Ref Range    APTT 26.5 24.7 - 36.0 sec   HCG Qual Serum   Result Value Ref Range    Beta-Hcg Qualitative Serum Positive (A) Negative   ESTIMATED GFR   Result Value Ref Range    GFR If African American >60 >60 mL/min/1.73 m 2    GFR If Non African American >60 >60 mL/min/1.73 m 2   RH TYPE FOR RHOGAM FROM E.D.   Result Value Ref Range    Emergency Department Rh Typing POS     Number Of Rh Doses Indicated ZERO        INTERVENTIONS:  Ordered labs and patient denies imaging studies and further care.    COURSE & MEDICAL DECISION MAKING    DIFFERENTIAL DIAGNOSIS:  Ectopic pregnancy/Threatened miscarriage /infectious gastroenteritis/ constipation        I informed the patient that her pregnancy test was positive, her H&H are normal, her BUN is normal. She has not had an ultrasound this pregnancy.    Patient decided to leave A .    I discussed with the patient the risks of their decision to leave without receiving the appropriate medical care. I discussed with the patient the risks of  their decision to refuse or withhold consent to receive appropriate medical care. The patient has the capacity to understand the risks and benefits described above. The patient is not intoxicated clinically, the patient's is alert and oriented and able to make a good decision in my opinion. I discussed alternative treatments with the patient. The patient was given discharge instructions and a followup plan as documented in the medical record. I have asked the patient to return at any time to the emergency department for any reason.      The patient will return for new or worsening symptoms and is stable at the time of discharge.    The patient is referred to her primary physician for blood pressure management, diabetic screening, and for all other preventative health concerns.    DISPOSITION:  Patient will be discharged home in stable condition.    FOLLOW UP:  [facility], Emergency Dept  [address] [facility] [id number] [phone]    any time, any reason    [address] [address]  [phone]      call for follow up      OUTPATIENT MEDICATIONS:  Discharge Medication List as of 10/28/2017  9:58 AM            PLAN:  Advised to follow up at [facility], return any time any reason.    CONDITION: stable    FINAL IMPRESSION  1. Left lower quadrant pain    2. Diarrhea, unspecified type    3. Pregnancy examination or test, positive result          Electronically signed by: Torrey Rodriguez, 10/28/2017 8:58 AM

## 2017-10-28 NOTE — ED NOTES
.  Chief Complaint   Patient presents with   • Nausea   • Abdominal Cramping     x 1 day   • Bloody Stools     dark blood x 1.      Ambulated to triage with above cc.

## 2017-11-04 ENCOUNTER — HOSPITAL ENCOUNTER (EMERGENCY)
Facility: MEDICAL CENTER | Age: 22
End: 2017-11-04
Attending: EMERGENCY MEDICINE
Payer: MEDICAID

## 2017-11-04 ENCOUNTER — APPOINTMENT (OUTPATIENT)
Dept: RADIOLOGY | Facility: MEDICAL CENTER | Age: 22
End: 2017-11-04
Attending: EMERGENCY MEDICINE
Payer: MEDICAID

## 2017-11-04 VITALS
HEIGHT: 64 IN | TEMPERATURE: 97.4 F | WEIGHT: 117.5 LBS | HEART RATE: 68 BPM | SYSTOLIC BLOOD PRESSURE: 117 MMHG | RESPIRATION RATE: 16 BRPM | OXYGEN SATURATION: 99 % | BODY MASS INDEX: 20.06 KG/M2 | DIASTOLIC BLOOD PRESSURE: 70 MMHG

## 2017-11-04 DIAGNOSIS — N76.0 BV (BACTERIAL VAGINOSIS): ICD-10-CM

## 2017-11-04 DIAGNOSIS — B96.89 BV (BACTERIAL VAGINOSIS): ICD-10-CM

## 2017-11-04 DIAGNOSIS — Z3A.01 LESS THAN 8 WEEKS GESTATION OF PREGNANCY: ICD-10-CM

## 2017-11-04 LAB
ALBUMIN SERPL BCP-MCNC: 4.3 G/DL (ref 3.2–4.9)
ALBUMIN/GLOB SERPL: 1.5 G/DL
ALP SERPL-CCNC: 42 U/L (ref 30–99)
ALT SERPL-CCNC: 8 U/L (ref 2–50)
ANION GAP SERPL CALC-SCNC: 8 MMOL/L (ref 0–11.9)
APPEARANCE UR: CLEAR
AST SERPL-CCNC: 12 U/L (ref 12–45)
B-HCG SERPL-ACNC: ABNORMAL MIU/ML (ref 0–5)
BACTERIA GENITAL QL WET PREP: NORMAL
BASOPHILS # BLD AUTO: 0.4 % (ref 0–1.8)
BASOPHILS # BLD: 0.02 K/UL (ref 0–0.12)
BILIRUB SERPL-MCNC: 0.2 MG/DL (ref 0.1–1.5)
BILIRUB UR QL STRIP.AUTO: NEGATIVE
BUN SERPL-MCNC: 13 MG/DL (ref 8–22)
C TRACH DNA SPEC QL NAA+PROBE: NEGATIVE
CALCIUM SERPL-MCNC: 9.2 MG/DL (ref 8.5–10.5)
CHLORIDE SERPL-SCNC: 105 MMOL/L (ref 96–112)
CO2 SERPL-SCNC: 22 MMOL/L (ref 20–33)
COLOR UR: YELLOW
CREAT SERPL-MCNC: 0.67 MG/DL (ref 0.5–1.4)
CULTURE IF INDICATED INDCX: NO UA CULTURE
EOSINOPHIL # BLD AUTO: 0.06 K/UL (ref 0–0.51)
EOSINOPHIL NFR BLD: 1.1 % (ref 0–6.9)
ERYTHROCYTE [DISTWIDTH] IN BLOOD BY AUTOMATED COUNT: 51.7 FL (ref 35.9–50)
GFR SERPL CREATININE-BSD FRML MDRD: >60 ML/MIN/1.73 M 2
GLOBULIN SER CALC-MCNC: 2.9 G/DL (ref 1.9–3.5)
GLUCOSE SERPL-MCNC: 85 MG/DL (ref 65–99)
GLUCOSE UR STRIP.AUTO-MCNC: NEGATIVE MG/DL
HCT VFR BLD AUTO: 36.6 % (ref 37–47)
HGB BLD-MCNC: 11.2 G/DL (ref 12–16)
IMM GRANULOCYTES # BLD AUTO: 0.03 K/UL (ref 0–0.11)
IMM GRANULOCYTES NFR BLD AUTO: 0.6 % (ref 0–0.9)
KETONES UR STRIP.AUTO-MCNC: NEGATIVE MG/DL
LEUKOCYTE ESTERASE UR QL STRIP.AUTO: NEGATIVE
LYMPHOCYTES # BLD AUTO: 1.69 K/UL (ref 1–4.8)
LYMPHOCYTES NFR BLD: 31.3 % (ref 22–41)
MCH RBC QN AUTO: 26.5 PG (ref 27–33)
MCHC RBC AUTO-ENTMCNC: 30.6 G/DL (ref 33.6–35)
MCV RBC AUTO: 86.7 FL (ref 81.4–97.8)
MICRO URNS: NORMAL
MONOCYTES # BLD AUTO: 0.32 K/UL (ref 0–0.85)
MONOCYTES NFR BLD AUTO: 5.9 % (ref 0–13.4)
N GONORRHOEA DNA SPEC QL NAA+PROBE: NEGATIVE
NEUTROPHILS # BLD AUTO: 3.28 K/UL (ref 2–7.15)
NEUTROPHILS NFR BLD: 60.7 % (ref 44–72)
NITRITE UR QL STRIP.AUTO: NEGATIVE
NRBC # BLD AUTO: 0 K/UL
NRBC BLD AUTO-RTO: 0 /100 WBC
NUMBER OF RH DOSES IND 8505RD: NORMAL
PH UR STRIP.AUTO: 6 [PH]
PLATELET # BLD AUTO: 197 K/UL (ref 164–446)
PMV BLD AUTO: 10.6 FL (ref 9–12.9)
POTASSIUM SERPL-SCNC: 3.8 MMOL/L (ref 3.6–5.5)
PROT SERPL-MCNC: 7.2 G/DL (ref 6–8.2)
PROT UR QL STRIP: NEGATIVE MG/DL
RBC # BLD AUTO: 4.22 M/UL (ref 4.2–5.4)
RBC UR QL AUTO: NEGATIVE
RH BLD: NORMAL
SIGNIFICANT IND 70042: NORMAL
SITE SITE: NORMAL
SODIUM SERPL-SCNC: 135 MMOL/L (ref 135–145)
SOURCE SOURCE: NORMAL
SP GR UR STRIP.AUTO: 1.02
SPECIMEN SOURCE: NORMAL
UROBILINOGEN UR STRIP.AUTO-MCNC: 0.2 MG/DL
WBC # BLD AUTO: 5.4 K/UL (ref 4.8–10.8)

## 2017-11-04 PROCEDURE — 80053 COMPREHEN METABOLIC PANEL: CPT

## 2017-11-04 PROCEDURE — 86901 BLOOD TYPING SEROLOGIC RH(D): CPT

## 2017-11-04 PROCEDURE — 96375 TX/PRO/DX INJ NEW DRUG ADDON: CPT

## 2017-11-04 PROCEDURE — 81003 URINALYSIS AUTO W/O SCOPE: CPT

## 2017-11-04 PROCEDURE — 99285 EMERGENCY DEPT VISIT HI MDM: CPT

## 2017-11-04 PROCEDURE — 85025 COMPLETE CBC W/AUTO DIFF WBC: CPT

## 2017-11-04 PROCEDURE — 87591 N.GONORRHOEAE DNA AMP PROB: CPT

## 2017-11-04 PROCEDURE — 700111 HCHG RX REV CODE 636 W/ 250 OVERRIDE (IP): Performed by: EMERGENCY MEDICINE

## 2017-11-04 PROCEDURE — 96374 THER/PROPH/DIAG INJ IV PUSH: CPT

## 2017-11-04 PROCEDURE — 76817 TRANSVAGINAL US OBSTETRIC: CPT

## 2017-11-04 PROCEDURE — 84702 CHORIONIC GONADOTROPIN TEST: CPT

## 2017-11-04 PROCEDURE — 87491 CHLMYD TRACH DNA AMP PROBE: CPT

## 2017-11-04 RX ORDER — ONDANSETRON 2 MG/ML
4 INJECTION INTRAMUSCULAR; INTRAVENOUS ONCE
Status: COMPLETED | OUTPATIENT
Start: 2017-11-04 | End: 2017-11-04

## 2017-11-04 RX ORDER — MORPHINE SULFATE 4 MG/ML
2 INJECTION, SOLUTION INTRAMUSCULAR; INTRAVENOUS ONCE
Status: COMPLETED | OUTPATIENT
Start: 2017-11-04 | End: 2017-11-04

## 2017-11-04 RX ADMIN — ONDANSETRON 4 MG: 2 INJECTION, SOLUTION INTRAMUSCULAR; INTRAVENOUS at 06:45

## 2017-11-04 RX ADMIN — MORPHINE SULFATE 2 MG: 4 INJECTION INTRAVENOUS at 06:45

## 2017-11-04 ASSESSMENT — LIFESTYLE VARIABLES
EVER HAD A DRINK FIRST THING IN THE MORNING TO STEADY YOUR NERVES TO GET RID OF A HANGOVER: NO
HAVE PEOPLE ANNOYED YOU BY CRITICIZING YOUR DRINKING: NO
TOTAL SCORE: 0
HAVE YOU EVER FELT YOU SHOULD CUT DOWN ON YOUR DRINKING: NO
EVER FELT BAD OR GUILTY ABOUT YOUR DRINKING: NO
DO YOU DRINK ALCOHOL: YES
TOTAL SCORE: 0
TOTAL SCORE: 0
CONSUMPTION TOTAL: INCOMPLETE

## 2017-11-04 ASSESSMENT — PAIN SCALES - GENERAL: PAINLEVEL_OUTOF10: 10

## 2017-11-04 NOTE — DISCHARGE INSTRUCTIONS
Bacterial Vaginosis  Bacterial vaginosis is an infection of the vagina. It happens when too many germs (bacteria) grow in the vagina. Having this infection puts you at risk for getting other infections from sex. Treating this infection can help lower your risk for other infections, such as:   · Chlamydia.  · Gonorrhea.  · HIV.  · Herpes.  HOME CARE  · Take your medicine as told by your doctor.  · Finish your medicine even if you start to feel better.  · Tell your sex partner that you have an infection. They should see their doctor for treatment.  · During treatment:  ¨ Avoid sex or use condoms correctly.  ¨ Do not douche.  ¨ Do not drink alcohol unless your doctor tells you it is ok.  ¨ Do not breastfeed unless your doctor tells you it is ok.  GET HELP IF:  · You are not getting better after 3 days of treatment.  · You have more grey fluid (discharge) coming from your vagina than before.  · You have more pain than before.  · You have a fever.  MAKE SURE YOU:   · Understand these instructions.  · Will watch your condition.  · Will get help right away if you are not doing well or get worse.     This information is not intended to replace advice given to you by your health care provider. Make sure you discuss any questions you have with your health care provider.     Document Released: 09/26/2009 Document Revised: 01/08/2016 Document Reviewed: 07/30/2014  [employer] Interactive Patient Education ©2016 [employer]  Pregnancy  If you are planning on getting pregnant, it is a good idea to make a preconception appointment with your caregiver to discuss having a healthy lifestyle before getting pregnant. This includes diet, weight, exercise, taking prenatal vitamins (especially folic acid, which helps prevent brain and spinal cord defects), avoiding alcohol, smoking and illegal drugs, medical problems (diabetes, convulsions), family history of genetic problems, working conditions, and immunizations. It is better to have  knowledge of these things and do something about them before getting pregnant.  During your pregnancy, it is important to follow certain guidelines in order to have a healthy baby. It is very important to get good prenatal care and follow your caregiver's instructions. Prenatal care includes all the medical care you receive before your baby's birth. This helps to prevent problems during the pregnancy and childbirth.  HOME CARE INSTRUCTIONS   · Start your prenatal visits by the 12th week of pregnancy or earlier, if possible. At first, appointments are usually scheduled monthly. They become more frequent in the last 2 months before delivery. It is important that you keep your caregiver's appointments and follow your caregiver's instructions regarding medication use, exercise, and diet.  · During pregnancy, you are providing food for you and your baby. Eat a regular, well-balanced diet. Choose foods such as meat, fish, milk and other dairy products, vegetables, fruits, whole-grain breads and cereals. Your caregiver will inform you of the ideal weight gain depending on your current height and weight. Drink lots of liquids. Try to drink 8 glasses of water a day.  · Alcohol is associated with a number of birth defects including fetal alcohol syndrome. It is best to avoid alcohol completely. Smoking will cause low birth rate and prematurity. Use of alcohol and nicotine during your pregnancy also increases the chances that your child will be chemically dependent later in their life and may contribute to SIDS (Sudden Infant Death Syndrome).  · Do not use illegal drugs.  · Only take prescription or over-the-counter medications that are recommended by your caregiver. Other medications can cause genetic and physical problems in the baby.  · Morning sickness can often be helped by keeping soda crackers at the bedside. Eat a few before getting up in the morning.  · A sexual relationship may be continued until near the end of  pregnancy if there are no other problems such as early (premature) leaking of amniotic fluid from the membranes, vaginal bleeding, painful intercourse or belly (abdominal) pain.  · Exercise regularly. Check with your caregiver if you are unsure of the safety of some of your exercises.  · Do not use hot tubs, steam rooms or saunas. These increase the risk of fainting and hurting yourself and the baby. Swimming is OK for exercise. Get plenty of rest, including afternoon naps when possible, especially in the third trimester.  · Avoid toxic odors and chemicals.  · Do not wear high heels. They may cause you to lose your balance and fall.  · Do not lift over 5 pounds. If you do lift anything, lift with your legs and thighs, not your back.  · Avoid long trips, especially in the third trimester.  · If you have to travel out of the city or state, take a copy of your medical records with you.  SEEK IMMEDIATE MEDICAL CARE IF:   · You develop an unexplained oral temperature above 102° F (38.9° C), or as your caregiver suggests.  · You have leaking of fluid from the vagina. If leaking membranes are suspected, take your temperature and inform your caregiver of this when you call.  · There is vaginal spotting or bleeding. Notify your caregiver of the amount and how many pads are used.  · You continue to feel sick to your stomach (nauseous) and have no relief from remedies suggested, or you throw up (vomit) blood or coffee ground like materials.  · You develop upper abdominal pain.  · You have round ligament discomfort in the lower abdominal area. This still must be evaluated by your caregiver.  · You feel contractions of the uterus.  · You do not feel the baby move, or there is less movement than before.  · You have painful urination.  · You have abnormal vaginal discharge.  · You have persistent diarrhea.  · You get a severe headache.  · You have problems with your vision.  · You develop muscle weakness.  · You feel dizzy and  faint.  · You develop shortness of breath.  · You develop chest pain.  · You have back pain that travels down to your leg and feet.  · You feel irregular or a very fast heartbeat.  · You develop excessive weight gain in a short period of time (5 pounds in 3 to 5 days).  · You are involved in a domestic violence situation.  Document Released: 12/18/2006 Document Revised: 06/18/2013 Document Reviewed: 06/11/2010  [employer]® Patient Information ©2014 [employer].

## 2017-11-04 NOTE — ED NOTES
"Triage Notes    Pt c/o lower abdominal/pelvic pain x 2 days with a postive home pregnancy test.  Pt states her LMP was around 9/15, she states she is having small spotting.    .  Chief Complaint   Patient presents with   • Pelvic Pain     cramping \"like period cramps, punching me in stomach\" x 2 days   • Pregnancy     LMP 9/15 aprox...missed period on 10/15 states only spotting...home preg test is positive     ./70   Pulse (!) 104   Temp 36.3 °C (97.4 °F) (Temporal)   Resp 16   Ht 1.626 m (5' 4\")   Wt 53.3 kg (117 lb 8.1 oz)   SpO2 100%   BMI 20.17 kg/m²     "

## 2017-11-04 NOTE — ED PROVIDER NOTES
"ED Provider Note    CHIEF COMPLAINT   Chief Complaint   Patient presents with   • Pelvic Pain     cramping \"like period cramps, punching me in stomach\" x 2 days   • Pregnancy     LMP 9/15 aprox...missed period on 10/15 states only spotting...home preg test is positive       HPI   Lucian Friedman is a 22 y.o. G4, P2, Ab1 female who presents claiming of left lower quadrant pelvic cramping and vaginal bleeding, like someone punched her in the stomach. The pain has been constant for 2 days. She has had some spotting. She states that she had a positive pregnancy test a few weeks ago at home. Her last menstrual period was September 15. She describes the pain as moderate, with occasional severe sharp stabbing pain. She states that the bleeding is not heavy. She denies any nausea, vomiting, dizziness or lightheadedness. She denies any abdominal distention. Currently the pain is 10 over 10. She denies any other dysuria, frequency, urgency, rashes or joint swelling.    REVIEW OF SYSTEMS     HEENT:  No ear pain, congestion or sore throat   EYES: no discharge redness or vision changes  CARDIAC: no chest pain, palpitations    PULMONARY: no dyspnea, cough or congestion   GI: no vomiting diarrhea or abdominal pain   : no dysuria, back pain or hematuria . Positive pelvic pain and vaginal bleeding  Neuro: no weakness, numbness aphasia or headache  Musculoskeletal: no swelling deformity or pain no joint swelling  Endocrine: no fevers, sweating, weight loss   SKIN: no rash, erythema or contusions     See history of present illness all other systems are negative      PAST MEDICAL HISTORY  Past Medical History:   Diagnosis Date   • Anxiety    • Cerebral palsy (CMS-[facility])    • MS (multiple sclerosis) (CMS-HCC)    • Psychiatric disorder     reactive detatchment d/o       FAMILY HISTORY  History reviewed. No pertinent family history.    SOCIAL HISTORY  Social History     Social History   • Marital status: Single     Spouse name: N/A   • " "Number of children: N/A   • Years of education: N/A     Social History Main Topics   • Smoking status: Current Every Day Smoker     Packs/day: 0.25     Types: Cigarettes   • Smokeless tobacco: Never Used   • Alcohol use Yes      Comment: socially   • Drug use: No   • Sexual activity: Not on file     Other Topics Concern   • Not on file     Social History Narrative   • No narrative on file       SURGICAL HISTORY  No past surgical history on file.    CURRENT MEDICATIONS  Home Medications     Reviewed by Makayla Flores R.N. (Registered Nurse) on 11/04/17 at 0616  Med List Status: Not Addressed   Medication Last Dose Status   nitrofurantoin monohydr macro (MACROBID) 100 MG Cap  Active   oxcarbazepine (TRILEPTAL) 300 MG Tab  Active                ALLERGIES  No Known Allergies    PHYSICAL EXAM  VITAL SIGNS: /70   Pulse 68   Temp 36.3 °C (97.4 °F) (Temporal)   Resp 16   Ht 1.626 m (5' 4\")   Wt 53.3 kg (117 lb 8.1 oz)   LMP 09/15/2017   SpO2 99%   BMI 20.17 kg/m²       Constitutional: Well developed, Well nourished,Uncomfortable appearing Non-toxic appearance.   HENT: Normocephalic, Atraumatic, Bilateral external ears normal, Oropharynx moist, No oral exudates, Nose normal.   Eyes: PERRLA, EOMI, Conjunctiva normal, No discharge.   Neck: Normal range of motion, No tenderness, Supple, No stridor.   Lymphatic: No lymphadenopathy noted.   Cardiovascular: Normal heart rate, Normal rhythm, No murmurs, No rubs, No gallops.   Breasts: Symmetric, No masses, No nipple discharge.   Thorax & Lungs: Normal breath sounds, No respiratory distress, No wheezing, No chest tenderness.   Abdomen: Bowel sounds normal, Soft, No tenderness, No masses, No pulsatile masses.   Genitalia: External genitalia appear normal, No masses or lesions.Positive for white vaginal discharge she disclosed no active bleeding mild tenderness in the uterus on palpation no adnexal masses  Skin: Warm, Dry, No erythema, No rash.   Back: No tenderness, " No CVA tenderness.   Extremities: Intact distal pulses, No edema, No tenderness, No cyanosis, No clubbing.   Musculoskeletal: Good range of motion in all major joints. No tenderness to palpation or major deformities noted.   Neurologic: Alert & oriented x 3, Normal motor function, Normal sensory function, No focal deficits noted.   Psychiatric: Affect normal, Judgment normal, Mood normal.         RADIOLOGY/PROCEDURES  US-OB PELVIS TRANSVAGINAL   Final Result      1.  Early intrauterine gestation of approximately 6 weeks 3 days with estimated date of delivery of 6/27/2018.   2.  Small amount of free fluid in the cul-de-sac, nonspecific.            COURSE & MEDICAL DECISION MAKING  Pertinent Labs & Imaging studies reviewed. (See chart for details)  Differential diagnosis: Ectopic pregnancy, miscarriage, discomfort of pregnancy, UTI    Results for orders placed or performed during the hospital encounter of 11/04/17   CBC WITH DIFFERENTIAL   Result Value Ref Range    WBC 5.4 4.8 - 10.8 K/uL    RBC 4.22 4.20 - 5.40 M/uL    Hemoglobin 11.2 (L) 12.0 - 16.0 g/dL    Hematocrit 36.6 (L) 37.0 - 47.0 %    MCV 86.7 81.4 - 97.8 fL    MCH 26.5 (L) 27.0 - 33.0 pg    MCHC 30.6 (L) 33.6 - 35.0 g/dL    RDW 51.7 (H) 35.9 - 50.0 fL    Platelet Count 197 164 - 446 K/uL    MPV 10.6 9.0 - 12.9 fL    Neutrophils-Polys 60.70 44.00 - 72.00 %    Lymphocytes 31.30 22.00 - 41.00 %    Monocytes 5.90 0.00 - 13.40 %    Eosinophils 1.10 0.00 - 6.90 %    Basophils 0.40 0.00 - 1.80 %    Immature Granulocytes 0.60 0.00 - 0.90 %    Nucleated RBC 0.00 /100 WBC    Neutrophils (Absolute) 3.28 2.00 - 7.15 K/uL    Lymphs (Absolute) 1.69 1.00 - 4.80 K/uL    Monos (Absolute) 0.32 0.00 - 0.85 K/uL    Eos (Absolute) 0.06 0.00 - 0.51 K/uL    Baso (Absolute) 0.02 0.00 - 0.12 K/uL    Immature Granulocytes (abs) 0.03 0.00 - 0.11 K/uL    NRBC (Absolute) 0.00 K/uL   RH TYPE FOR RHOGAM FROM E.D.   Result Value Ref Range    Emergency Department Rh Typing POS     Number  Of Rh Doses Indicated ZERO    URINALYSIS CULTURE, IF INDICATED   Result Value Ref Range    Color Yellow     Character Clear     Specific Gravity 1.022 <1.035    Ph 6.0 5.0 - 8.0    Glucose Negative Negative mg/dL    Ketones Negative Negative mg/dL    Protein Negative Negative mg/dL    Bilirubin Negative Negative    Urobilinogen, Urine 0.2 Negative    Nitrite Negative Negative    Leukocyte Esterase Negative Negative    Occult Blood Negative Negative    Micro Urine Req see below     Culture Indicated No UA Culture   HCG QUANTITATIVE SERUM   Result Value Ref Range    Bhcg 62609.5 (H) 0.0 - 5.0 mIU/mL   CMP   Result Value Ref Range    Sodium 135 135 - 145 mmol/L    Potassium 3.8 3.6 - 5.5 mmol/L    Chloride 105 96 - 112 mmol/L    Co2 22 20 - 33 mmol/L    Anion Gap 8.0 0.0 - 11.9    Glucose 85 65 - 99 mg/dL    Bun 13 8 - 22 mg/dL    Creatinine 0.67 0.50 - 1.40 mg/dL    Calcium 9.2 8.5 - 10.5 mg/dL    AST(SGOT) 12 12 - 45 U/L    ALT(SGPT) 8 2 - 50 U/L    Alkaline Phosphatase 42 30 - 99 U/L    Total Bilirubin 0.2 0.1 - 1.5 mg/dL    Albumin 4.3 3.2 - 4.9 g/dL    Total Protein 7.2 6.0 - 8.2 g/dL    Globulin 2.9 1.9 - 3.5 g/dL    A-G Ratio 1.5 g/dL   ESTIMATED GFR   Result Value Ref Range    GFR If African American >60 >60 mL/min/1.73 m 2    GFR If Non African American >60 >60 mL/min/1.73 m 2   WET PREP   Result Value Ref Range    Significant Indicator NEG     Source GEN     Site VAGINAL     Wet Prep For Parasites       Few clue cells seen.  No yeast.  No motile Trichomonas seen.     CHLAMYDIA & GC BY PCR   Result Value Ref Range    Source Vaginal         6:33 AM The patient looks very uncomfortable. An IV was started and gave her 2 milligrams of morphine and 4 of Zofran.    8:12 AM The patient understands she has been any pregnancy and no evidence of ectopic. She does have some free fluid and may have ruptured an ovarian cyst. She also has some bacterial vaginosis which can be treated after the 1st trimester. The patient is  to follow-up with the pregnancy Center and return for any worsening pain, heavy bleeding or worsening symptoms.    [facility]  [address] [place] [address]  [place] [address]  [phone]    Call in 2 days  for recheck, to establish care    Current Outpatient Prescriptions   Medication Sig Dispense Refill   • oxcarbazepine (TRILEPTAL) 300 MG Tab Take 1 Tab by mouth 2 times a day. 60 Tab 0   • nitrofurantoin monohydr macro (MACROBID) 100 MG Cap Take 1 Cap by mouth 2 times a day. 20 Cap 0         FINAL IMPRESSION  1. Less than 8 weeks gestation of pregnancy    2. BV (bacterial vaginosis)            Electronically signed by: Kira Drake, 11/4/2017 6:30 AM

## 2017-11-04 NOTE — ED NOTES
Assumed care of patient. Patient complains of lower abdominal cramping similar to menstrual cycle with dark red blood noted when she wipes.  Patient alert and oriented x 4. Patient denies any other complaints at this time. Patient side rails up x 2 and call bell within reach.

## 2018-02-19 ENCOUNTER — HOSPITAL ENCOUNTER (EMERGENCY)
Facility: MEDICAL CENTER | Age: 23
End: 2018-02-19
Attending: EMERGENCY MEDICINE
Payer: MEDICAID

## 2018-02-19 ENCOUNTER — APPOINTMENT (OUTPATIENT)
Dept: RADIOLOGY | Facility: MEDICAL CENTER | Age: 23
End: 2018-02-19
Attending: EMERGENCY MEDICINE
Payer: MEDICAID

## 2018-02-19 VITALS
SYSTOLIC BLOOD PRESSURE: 114 MMHG | HEIGHT: 60 IN | DIASTOLIC BLOOD PRESSURE: 73 MMHG | HEART RATE: 123 BPM | OXYGEN SATURATION: 99 % | BODY MASS INDEX: 24.76 KG/M2 | TEMPERATURE: 98.3 F | RESPIRATION RATE: 16 BRPM | WEIGHT: 126.1 LBS

## 2018-02-19 DIAGNOSIS — O20.0 THREATENED MISCARRIAGE: ICD-10-CM

## 2018-02-19 LAB
APPEARANCE UR: CLEAR
B-HCG SERPL-ACNC: ABNORMAL MIU/ML (ref 0–5)
BACTERIA #/AREA URNS HPF: NEGATIVE /HPF
BILIRUB UR QL STRIP.AUTO: NEGATIVE
COLOR UR: YELLOW
CULTURE IF INDICATED INDCX: YES UA CULTURE
EPI CELLS #/AREA URNS HPF: ABNORMAL /HPF
GLUCOSE UR STRIP.AUTO-MCNC: NEGATIVE MG/DL
HYALINE CASTS #/AREA URNS LPF: ABNORMAL /LPF
KETONES UR STRIP.AUTO-MCNC: NEGATIVE MG/DL
LEUKOCYTE ESTERASE UR QL STRIP.AUTO: ABNORMAL
MICRO URNS: ABNORMAL
MUCOUS THREADS #/AREA URNS HPF: ABNORMAL /HPF
NITRITE UR QL STRIP.AUTO: NEGATIVE
NUMBER OF RH DOSES IND 8505RD: NORMAL
PH UR STRIP.AUTO: 6 [PH]
PROT UR QL STRIP: 30 MG/DL
RBC # URNS HPF: ABNORMAL /HPF
RBC UR QL AUTO: NEGATIVE
RH BLD: NORMAL
SP GR UR STRIP.AUTO: 1.02
UROBILINOGEN UR STRIP.AUTO-MCNC: 2 MG/DL
WBC #/AREA URNS HPF: ABNORMAL /HPF

## 2018-02-19 PROCEDURE — 84702 CHORIONIC GONADOTROPIN TEST: CPT

## 2018-02-19 PROCEDURE — 86901 BLOOD TYPING SEROLOGIC RH(D): CPT

## 2018-02-19 PROCEDURE — 76817 TRANSVAGINAL US OBSTETRIC: CPT

## 2018-02-19 PROCEDURE — 87086 URINE CULTURE/COLONY COUNT: CPT

## 2018-02-19 PROCEDURE — 99284 EMERGENCY DEPT VISIT MOD MDM: CPT

## 2018-02-19 PROCEDURE — 81001 URINALYSIS AUTO W/SCOPE: CPT

## 2018-02-19 ASSESSMENT — PAIN SCALES - GENERAL: PAINLEVEL_OUTOF10: 7

## 2018-02-19 NOTE — ED PROVIDER NOTES
ED Provider Note    CHIEF COMPLAINT  Chief Complaint   Patient presents with   • Cramping     16wks pregnant    • Diarrhea   • Vaginal Bleeding     spotting onset this morning       HPI  Ines Friedman is a 22 y.o. female who presents for evaluation of crampy pelvic pain in pregnancy. She believes she is 16 weeks pregnant. She is a  SAB 1. This morning she started having spotting. She is also had some diarrhea. She said no fevers or chills. She said no urinary symptoms. She's had no vomiting. She has had no prenatal care to this point.    REVIEW OF SYSTEMS  See HPI for further details. All other systems are negative.     PAST MEDICAL HISTORY  Past Medical History:   Diagnosis Date   • Seizure disorder (CMS-HCC)     once       FAMILY HISTORY  No family history on file.    SOCIAL HISTORY  Social History     Social History   • Marital status: Single     Spouse name: N/A   • Number of children: N/A   • Years of education: N/A     Social History Main Topics   • Smoking status: Current Every Day Smoker     Packs/day: 0.50     Types: Cigarettes   • Smokeless tobacco: Never Used   • Alcohol use No   • Drug use: No   • Sexual activity: Yes     Partners: Male      Comment: NONE      Other Topics Concern   • Not on file     Social History Narrative   • No narrative on file       SURGICAL HISTORY  Past Surgical History:   Procedure Laterality Date   • DILATION AND EVACUATION N/A 2017    Procedure: DILATION AND EVACUATION;  Surgeon: Pawan Brooks M.D.;  Location: SURGERY SAME DAY [facility];  Service:    • PRIMARY C SECTION  2015    Procedure: PRIMARY C SECTION;  Surgeon: Jenni Worrell M.D.;  Location: LABOR AND DELIVERY;  Service:        CURRENT MEDICATIONS  Home Medications    **Home medications have not yet been reviewed for this encounter**         ALLERGIES  No Known Allergies    PHYSICAL EXAM  VITAL SIGNS: /73   Pulse (!) 123   Temp 36.8 °C (98.3 °F)   Resp 16   Ht 1.524 m (5')   Wt  57.2 kg (126 lb 1.7 oz)   LMP 2016   SpO2 99%   BMI 24.63 kg/m²     Constitutional: Well developed, Well nourished, No acute distress, Non-toxic appearance.   HENT: Normocephalic, Atraumatic.   Eyes:  EOMI, Conjunctiva normal, No discharge.   Cardiovascular: Tachycardic.   Thorax & Lungs: No respiratory distress.   Abdomen: Soft, gravid. Minimal left lower quadrant tenderness.  Skin: Warm, Dry.   Extremities:  No edema, No cyanosis. No calf tenderness or swelling.  Musculoskeletal: Good range of motion in all major joints.  Neurologic: Awake alert.    RADIOLOGY/PROCEDURES  US-OB PELVIS TRANSVAGINAL   Final Result      Single live intrauterine pregnancy with estimated menstrual age of 20 weeks 6 days.      Anterior/fundal placenta without evidence of previa.      Fetal anatomy not evaluated.            COURSE & MEDICAL DECISION MAKING  Pertinent Labs & Imaging studies reviewed. (See chart for details)  This is a 22-year-old  SAB 1 who is here for evaluation of vaginal spotting in pregnancy. Her quantitative hCG is 39,000. She is Rh positive and does not require RhoGAM. Pelvic ultrasound demonstrates a single live IUP at 20 weeks and 6 days. I discussed results of the studies with the patient. She states that she is currently awaiting a callback from the pregnancy center to schedule her 1st appointment. At this point she will be discharged home. She is given a discharge instruction sheet on pelvic rest and threatened miscarriages.    FINAL IMPRESSION  1. Threatened miscarriage  2.   3.         Electronically signed by: Everardo Melo, 2018 10:18 AM

## 2018-02-19 NOTE — ED TRIAGE NOTES
Chief Complaint   Patient presents with   • Cramping     16wks pregnant    • Diarrhea   • Vaginal Bleeding     spotting onset this morning     Pt ambulated to triage,nad. Approximately 16wks pregnant c/o vaginal spotting ,cramping and diarrhea onset this morning. No prenatal care .   She mentioned that her stool had white discharge.

## 2018-02-19 NOTE — DISCHARGE INSTRUCTIONS
Threatened Miscarriage  A threatened miscarriage occurs when you have vaginal bleeding during your first 20 weeks of pregnancy but the pregnancy has not ended. If you have vaginal bleeding during this time, your health care provider will do tests to make sure you are still pregnant. If the tests show you are still pregnant and the developing baby (fetus) inside your womb (uterus) is still growing, your condition is considered a threatened miscarriage.  A threatened miscarriage does not mean your pregnancy will end, but it does increase the risk of losing your pregnancy (complete miscarriage).  CAUSES   The cause of a threatened miscarriage is usually not known. If you go on to have a complete miscarriage, the most common cause is an abnormal number of chromosomes in the developing baby. Chromosomes are the structures inside cells that hold all your genetic material.  Some causes of vaginal bleeding that do not result in miscarriage include:  · Having sex.  · Having an infection.  · Normal hormone changes of pregnancy.  · Bleeding that occurs when an egg implants in your uterus.  RISK FACTORS  Risk factors for bleeding in early pregnancy include:  · Obesity.  · Smoking.  · Drinking excessive amounts of alcohol or caffeine.  · Recreational drug use.  SIGNS AND SYMPTOMS  · Light vaginal bleeding.  · Mild abdominal pain or cramps.  DIAGNOSIS   If you have bleeding with or without abdominal pain before 20 weeks of pregnancy, your health care provider will do tests to check whether you are still pregnant. One important test involves using sound waves and a computer (ultrasound) to create images of the inside of your uterus. Other tests include an internal exam of your vagina and uterus (pelvic exam) and measurement of your baby's heart rate.   You may be diagnosed with a threatened miscarriage if:  · Ultrasound testing shows you are still pregnant.  · Your baby's heart rate is strong.  · A pelvic exam shows that the  opening between your uterus and your vagina (cervix) is closed.  · Your heart rate and blood pressure are stable.  · Blood tests confirm you are still pregnant.  TREATMENT   No treatments have been shown to prevent a threatened miscarriage from going on to a complete miscarriage. However, the right home care is important.   HOME CARE INSTRUCTIONS   · Make sure you keep all your appointments for prenatal care. This is very important.  · Get plenty of rest.  · Do not have sex or use tampons if you have vaginal bleeding.  · Do not douche.  · Do not smoke or use recreational drugs.  · Do not drink alcohol.  · Avoid caffeine.  SEEK MEDICAL CARE IF:  · You have light vaginal bleeding or spotting while pregnant.  · You have abdominal pain or cramping.  · You have a fever.  SEEK IMMEDIATE MEDICAL CARE IF:  · You have heavy vaginal bleeding.  · You have blood clots coming from your vagina.  · You have severe low back pain or abdominal cramps.  · You have fever, chills, and severe abdominal pain.  MAKE SURE YOU:  · Understand these instructions.  · Will watch your condition.  · Will get help right away if you are not doing well or get worse.     This information is not intended to replace advice given to you by your health care provider. Make sure you discuss any questions you have with your health care provider.     Document Released: 2006 Document Revised: 2014 Document Reviewed: 10/14/2014  [employer] Interactive Patient Education ©6 [employer]    Pelvic Rest  Pelvic rest is sometimes recommended for women when:   · The placenta is partially or completely covering the opening of the cervix (placenta previa).  · There is bleeding between the uterine wall and the amniotic sac in the first trimester (subchorionic hemorrhage).  · The cervix begins to open without labor starting (incompetent cervix, cervical insufficiency).  · The labor is too early ( labor).  HOME CARE INSTRUCTIONS  · Do not have sexual  intercourse, stimulation, or an orgasm.  · Do not use tampons, douche, or put anything in the vagina.  · Do not lift anything over 10 pounds (4.5 kg).  · Avoid strenuous activity or straining your pelvic muscles.  SEEK MEDICAL CARE IF:   · You have any vaginal bleeding during pregnancy. Treat this as a potential emergency.  · You have cramping pain felt low in the stomach (stronger than menstrual cramps).  · You notice vaginal discharge (watery, mucus, or bloody).  · You have a low, dull backache.  · There are regular contractions or uterine tightening.  SEEK IMMEDIATE MEDICAL CARE IF:  You have vaginal bleeding and have placenta previa.      This information is not intended to replace advice given to you by your health care provider. Make sure you discuss any questions you have with your health care provider.     Document Released: 04/13/2012 Document Revised: 03/11/2013 Document Reviewed: 04/13/2012  [employer] Interactive Patient Education ©2016 [employer]

## 2018-02-21 LAB
BACTERIA UR CULT: NORMAL
SIGNIFICANT IND 70042: NORMAL
SITE SITE: NORMAL
SOURCE SOURCE: NORMAL

## 2018-06-07 ENCOUNTER — HOSPITAL ENCOUNTER (OUTPATIENT)
Facility: MEDICAL CENTER | Age: 23
End: 2018-06-08
Attending: OBSTETRICS & GYNECOLOGY | Admitting: OBSTETRICS & GYNECOLOGY
Payer: MEDICAID

## 2018-06-07 VITALS
BODY MASS INDEX: 23.05 KG/M2 | HEART RATE: 112 BPM | TEMPERATURE: 98.2 F | SYSTOLIC BLOOD PRESSURE: 116 MMHG | HEIGHT: 64 IN | DIASTOLIC BLOOD PRESSURE: 71 MMHG | WEIGHT: 135 LBS

## 2018-06-07 LAB
ABO GROUP BLD: NORMAL
BLD GP AB SCN SERPL QL: NORMAL
RH BLD: NORMAL

## 2018-06-07 PROCEDURE — 86780 TREPONEMA PALLIDUM: CPT

## 2018-06-07 PROCEDURE — 87340 HEPATITIS B SURFACE AG IA: CPT

## 2018-06-07 PROCEDURE — 59025 FETAL NON-STRESS TEST: CPT | Performed by: NURSE PRACTITIONER

## 2018-06-07 PROCEDURE — 86762 RUBELLA ANTIBODY: CPT

## 2018-06-07 PROCEDURE — 86803 HEPATITIS C AB TEST: CPT

## 2018-06-07 PROCEDURE — 86850 RBC ANTIBODY SCREEN: CPT

## 2018-06-07 PROCEDURE — 85025 COMPLETE CBC W/AUTO DIFF WBC: CPT

## 2018-06-07 PROCEDURE — 87389 HIV-1 AG W/HIV-1&-2 AB AG IA: CPT

## 2018-06-07 PROCEDURE — 36415 COLL VENOUS BLD VENIPUNCTURE: CPT

## 2018-06-07 PROCEDURE — 86900 BLOOD TYPING SEROLOGIC ABO: CPT

## 2018-06-07 PROCEDURE — 86901 BLOOD TYPING SEROLOGIC RH(D): CPT

## 2018-06-08 ENCOUNTER — HOSPITAL ENCOUNTER (OUTPATIENT)
Dept: RADIOLOGY | Facility: MEDICAL CENTER | Age: 23
End: 2018-06-08
Attending: NURSE PRACTITIONER

## 2018-06-08 LAB
AMPHET UR QL SCN: NEGATIVE
APPEARANCE UR: CLEAR
BACTERIA #/AREA URNS HPF: ABNORMAL /HPF
BARBITURATES UR QL SCN: NEGATIVE
BASOPHILS # BLD AUTO: 0.3 % (ref 0–1.8)
BASOPHILS # BLD: 0.03 K/UL (ref 0–0.12)
BENZODIAZ UR QL SCN: NEGATIVE
BILIRUB UR QL STRIP.AUTO: NEGATIVE
BZE UR QL SCN: NEGATIVE
CANNABINOIDS UR QL SCN: POSITIVE
COLOR UR: YELLOW
EOSINOPHIL # BLD AUTO: 0.09 K/UL (ref 0–0.51)
EOSINOPHIL NFR BLD: 1 % (ref 0–6.9)
EPI CELLS #/AREA URNS HPF: ABNORMAL /HPF
ERYTHROCYTE [DISTWIDTH] IN BLOOD BY AUTOMATED COUNT: 42.8 FL (ref 35.9–50)
GLUCOSE UR STRIP.AUTO-MCNC: NEGATIVE MG/DL
HBV SURFACE AG SER QL: NEGATIVE
HCT VFR BLD AUTO: 26.8 % (ref 37–47)
HCV AB SER QL: NEGATIVE
HGB BLD-MCNC: 8.4 G/DL (ref 12–16)
HIV 1+2 AB+HIV1 P24 AG SERPL QL IA: NON REACTIVE
HYALINE CASTS #/AREA URNS LPF: ABNORMAL /LPF
IMM GRANULOCYTES # BLD AUTO: 0.14 K/UL (ref 0–0.11)
IMM GRANULOCYTES NFR BLD AUTO: 1.6 % (ref 0–0.9)
KETONES UR STRIP.AUTO-MCNC: NEGATIVE MG/DL
LEUKOCYTE ESTERASE UR QL STRIP.AUTO: ABNORMAL
LYMPHOCYTES # BLD AUTO: 1.93 K/UL (ref 1–4.8)
LYMPHOCYTES NFR BLD: 21.9 % (ref 22–41)
MCH RBC QN AUTO: 26 PG (ref 27–33)
MCHC RBC AUTO-ENTMCNC: 31.3 G/DL (ref 33.6–35)
MCV RBC AUTO: 83 FL (ref 81.4–97.8)
METHADONE UR QL SCN: NEGATIVE
MICRO URNS: ABNORMAL
MONOCYTES # BLD AUTO: 0.67 K/UL (ref 0–0.85)
MONOCYTES NFR BLD AUTO: 7.6 % (ref 0–13.4)
NEUTROPHILS # BLD AUTO: 5.96 K/UL (ref 2–7.15)
NEUTROPHILS NFR BLD: 67.6 % (ref 44–72)
NITRITE UR QL STRIP.AUTO: NEGATIVE
NRBC # BLD AUTO: 0.08 K/UL
NRBC BLD-RTO: 0.9 /100 WBC
OPIATES UR QL SCN: NEGATIVE
OXYCODONE UR QL SCN: NEGATIVE
PCP UR QL SCN: NEGATIVE
PH UR STRIP.AUTO: 6.5 [PH]
PLATELET # BLD AUTO: 169 K/UL (ref 164–446)
PMV BLD AUTO: 10 FL (ref 9–12.9)
PROPOXYPH UR QL SCN: NEGATIVE
PROT UR QL STRIP: NEGATIVE MG/DL
RBC # BLD AUTO: 3.23 M/UL (ref 4.2–5.4)
RBC # URNS HPF: ABNORMAL /HPF
RBC UR QL AUTO: NEGATIVE
RUBV AB SER QL: 26.8 IU/ML
SP GR UR STRIP.AUTO: 1.02
TREPONEMA PALLIDUM IGG+IGM AB [PRESENCE] IN SERUM OR PLASMA BY IMMUNOASSAY: NON REACTIVE
UROBILINOGEN UR STRIP.AUTO-MCNC: 2 MG/DL
WBC # BLD AUTO: 8.8 K/UL (ref 4.8–10.8)
WBC #/AREA URNS HPF: ABNORMAL /HPF

## 2018-06-08 PROCEDURE — 80307 DRUG TEST PRSMV CHEM ANLYZR: CPT

## 2018-06-08 PROCEDURE — 76816 OB US FOLLOW-UP PER FETUS: CPT

## 2018-06-08 PROCEDURE — 81001 URINALYSIS AUTO W/SCOPE: CPT | Mod: XU

## 2018-06-08 PROCEDURE — 87653 STREP B DNA AMP PROBE: CPT

## 2018-06-08 NOTE — H&P
[facility]ALK-[facility] TRIAGE NOTE    PATIENT ID:  NAME:  Ines Friedman  MRN:               [id number]  YOB: 1995    CC:  Having abdominal pain    HPI:  Ines Friedman is a 22 y.o. female G4 P 2012 at 36 2/7 week gestation by an ER ultrasound on 2/19/18 listing an bre of 7/3/18.  Patient presents complaining of abdominal cramping. No loss of fluid or vaginal bleeding. Reports good fetal movement. Pregnancy was complicated by no prenatal care and hx of c/s x2.     ROS: Patient denies any fever chills, nausea, vomiting, headache, chest pain, shortness of breath, or dysuria or unusual swelling of hands or feet.     Prenatal Care: none    Prenatal Labs:   HepBsAg: neg HIV: neg Rubella: immune   RPR: neg PAP: not done GBS: pending   GC/CT: not done O pos/ Ab neg Quad Screen: too late     Recent Labs      06/07/18   2233   WBC  8.8   RBC  3.23*   HEMOGLOBIN  8.4*   HEMATOCRIT  26.8*   MCV  83.0   MCH  26.0*   RDW  42.8   PLATELETCT  169   MPV  10.0   NEUTSPOLYS  67.60   LYMPHOCYTES  21.90*   MONOCYTES  7.60   EOSINOPHILS  1.00   BASOPHILS  0.30     No results for input(s): SODIUM, POTASSIUM, CHLORIDE, CO2, GLUCOSE, BUN, CPKTOTAL in the last 72 hours.       IMAGING:  OB ultrasound:     Images     Show images for US-OB PELVIS TRANSVAGINAL   Imaging Result Status     Status: Final result (Exam End: 2/19/2018 12:40 PM)   Imaging Previous Results     Open Hard Copy Result Report (Order #[id number] - US-OB PELVIS TRANSVAGINAL)   Narrative       2/19/2018 11:26 AM    HISTORY/REASON FOR EXAM:  Vaginal bleeding  Pregnant    TECHNIQUE/EXAM DESCRIPTION: Real-time OB transvaginal pelvis ultrasound was performed with grey-scale, color and duplex Doppler imaging.    COMPARISON:  None.    FINDINGS:  A single live intrauterine pregnancy is identified. The fetus is in cephalic presentation. The placenta is anterior/fundal without evidence of previa. No retroplacental hemorrhage identified.    Amniotic fluid volume  is within normal limits with an TANGELA of 18.0 cm. Heart rate is measured at 153 bpm. Estimated menstrual age is 20 weeks 6 days with estimated delivery of July 3, 2018.    No maternal adnexal mass is identified. The left ovary is unremarkable. The right ovary is not visualized. The cervix is closed measuring 3.8 cm in length.    Fetal anatomy not evaluated.   Impression       Single live intrauterine pregnancy with estimated menstrual age of 20 weeks 6 days.    Anterior/fundal placenta without evidence of previa.    Fetal anatomy not evaluated.   Reading Provider Reading Date   Enrike Wiley M.D. 2018   Signing Provider Signing Date Signing Time   Enrike Wiley M.D. 2018 12:49 PM   Order Detail Report     US-OB PELVIS TRANSVAGINAL (Order #[id number]) on 18       Ultrasound done this evening, normal growth scan. Unable to copy  and paste into this note. See ultrasound results.     POB Hx:   G 4 P 2015 c/s preeclampsia 37 weeks male  2016 c/s repeat 40 2/7 weeks female  16 SAB with D&E at 6-8 week gestation    PMH/Problem List:    Past Medical History:   Diagnosis Date   • Seizure disorder (CMS-[facility])     once     Patient Active Problem List    Diagnosis Date Noted   • Hx of preeclampsia, prior pregnancy, currently pregnant 2016   • Encounter for supervision of other normal pregnancy 2016   • History of  delivery affecting pregnancy 2016   • Keloid scar 2016   • History of seizure 2016       Current Outpatient Medications:    None, no pnv    PSH:    Past Surgical History:   Procedure Laterality Date   • DILATION AND EVACUATION N/A 2017    Procedure: DILATION AND EVACUATION;  Surgeon: Pawan Brooks M.D.;  Location: SURGERY SAME DAY [facility];  Service:    • PRIMARY C SECTION  2015    Procedure: PRIMARY C SECTION;  Surgeon: Jenni Worrell M.D.;  Location: LABOR AND DELIVERY;  Service:        Allergies:   No Known  "Allergies    Psych/soc    States no substances or alcohol. Smokes 2 cigarettes per day.       PHYSICAL EXAM:  Vitals:    18 2159   BP: 116/71   Pulse: (!) 112   Temp: 36.8 °C (98.2 °F)   TempSrc: Temporal   Weight: 61.2 kg (135 lb)   Height: 1.626 m (5' 4\")     Temp (24hrs), Av.8 °C (98.2 °F), Min:36.8 °C (98.2 °F), Max:36.8 °C (98.2 °F)    General: No acute distress, resting comfortably in bed.  HEENT: normocephalic, nontraumatic, PERRLA, EOMI  Cardiovascular: Heart RRR with no murmurs, rubs or gallops. Distal Pulses 2+  Respiratory: symmetric chest expansion, lungs CTA bilaterally with no wheezes rales or  rhonci  Abdomen: gravid, nontender  Musculoskeletal: strength 5/5 in four extremities  Neuro: non focal with no numbness, tingling or changes in sensation    SVE: none, deferred  [place]: Rare, irregular UC  FHT - category one strip    A/P: Intrauterine pregancy at 36 2/7 week gestation   1. Not in labor  2. No prenatal care with hx of c/s   3. Ultrasound and prenatal labs updated as patient will be staying in this area and ultimately needs a repeat c/s.  4. Patient dc home stable, reassuring maternal/fetal status.          "

## 2018-06-09 LAB — GP B STREP DNA SPEC QL NAA+PROBE: NEGATIVE

## 2018-06-15 ENCOUNTER — NON-PROVIDER VISIT (OUTPATIENT)
Dept: OBGYN | Facility: CLINIC | Age: 23
End: 2018-06-15
Payer: MEDICAID

## 2018-06-15 DIAGNOSIS — N92.6 MISSED PERIOD: ICD-10-CM

## 2018-06-19 ENCOUNTER — HOSPITAL ENCOUNTER (OUTPATIENT)
Facility: MEDICAL CENTER | Age: 23
End: 2018-06-19
Attending: OBSTETRICS & GYNECOLOGY | Admitting: OBSTETRICS & GYNECOLOGY
Payer: MEDICAID

## 2018-06-19 VITALS — TEMPERATURE: 97.6 F | HEIGHT: 64 IN | WEIGHT: 135 LBS | BODY MASS INDEX: 23.05 KG/M2

## 2018-06-19 PROCEDURE — 59025 FETAL NON-STRESS TEST: CPT | Performed by: STUDENT IN AN ORGANIZED HEALTH CARE EDUCATION/TRAINING PROGRAM

## 2018-06-19 PROCEDURE — 81002 URINALYSIS NONAUTO W/O SCOPE: CPT

## 2018-06-19 NOTE — PROGRESS NOTES
22 y.o.  EDC 18 EGA 38.0 based off an ER US on 18   NO prenatal care. Pt states she has been moving back and forth between [facility] and here throughout her pregnancy so she has not been able to obtain prenatal care.   PT presents to triage c/o white creamy vaginal discharge, left sided abdominal pain under her rib, and decreased fetal movement since 0600. Pt reports vomiting this morning one time after attempting to eat. Pt states she was seen here on  and the nurse told her to come for her C/S today.  NO encounter for  documented. Pt was seen here on  where US was performed and prenatal labs were collected and SARAHI Boggs evaluated her. UDS at that time positive for Mariajuana.   EFM & [place] applied, Visible and audible fetal movement.   , moderate variability, accels present, pt moving braoken tracing. Pt wedged to left, monitors readjusted.   0825 Dr. Nash at bedside, assessment performed. SVE attempted. Pt not tolerating exam well. MD unable to reach cervix.   Reactive NST with occasional contractions. Discharge order received. PT educated to return to the hospital if contractions increase, for vaginal bleeding, LOF, or decreased fetal movement.   Pt verbalized understanding.     Pt states she is giving up this baby for adoption. That she is working with an adoption agency and that the family is here from [place].     Pt states that she and the FOB are not together, that he has her 2 other children in [place] and is living with his Grandparents. Pt states she is staying with friends here in [place].     Male and female visitors at bedside. Discharge instrcutions signed.   1010 DE discharged to home, ambulatory.

## 2018-06-19 NOTE — PROGRESS NOTES
[facility] TRIAGE PROGRESS NOTE    PATIENT ID:  NAME:  Ines Friedman  MRN:               [id number]  YOB: 1995     22 y.o. female  at 38w0d (dating by US in February)     Subjective: Pt reports to triage because of some vaginal discharge and some abdominal discomfort. Also reports slightly decreased fetal movement. Says that she is supposed to have her  - hx of 2 prior c/s.    For prenatal care - reports that she had 1 visit in [place]. Moved back and forth from [place] to [place] and never established for care.    Pregnancy complicated by prior c/s x 2, no PNC, cannabis use.    positive  For CTXS.   positive Feels pain   negative for LOF  negative for vaginal bleeding.   positive for fetal movement    ROS: Patient denies any fever chills, nausea, vomiting, headache, chest pain, shortness of breath, or dysuria or unusual swelling of hands or feet.     Objective:    There were no vitals filed for this visit.  No data recorded.    General: uncomfortable appearing female, nontoxic appearing  HEENT: normocephalic, nontraumatic, PERRLA, EOMI  Cardiovascular: Heart RRR with gentle systolic murmur. Distal Pulses 2+  Respiratory: symmetric chest expansion, lungs CTAB, with no wheezes, rales, rhonci  Abdomen: gravid, leopolds cephalic, fundal height 34 cm,   Musculoskeletal: strength 5/5 in four extremities  Neuro: non focal with no numbness, tingling or changes in sensation    Cervix:  did not tolerate cervical exam - high and posterior  [place]: Uterine Contractions Q20 minutes  FHRM: initially with baseline 140-150, Accels to 165 - moderate variability and broken tracing     Assessment: 22 y.o. female  at 38w0d by 2nd trimester US - poor PNC and hx of c/s x 2  Urine dip w/o infection     Plan:     Once patient has reactive tracing, plan on d/c home w/ routine return precautions (regular cxn's, vag bleeding, decreased movement, loss of fluid)  Counseled about increased PO  hydration, avoiding overheating, good diet.   Unable to establish care w/ presentation at term and no PNC    Discussed case with Dr. Knight, TPN Attending. Case was discussed and attending agreed with plan prior to discharge of patient.

## 2018-06-21 ENCOUNTER — HOSPITAL ENCOUNTER (INPATIENT)
Facility: MEDICAL CENTER | Age: 23
LOS: 1 days | End: 2018-06-21
Attending: OBSTETRICS & GYNECOLOGY | Admitting: OBSTETRICS & GYNECOLOGY
Payer: MEDICAID

## 2018-06-21 ENCOUNTER — HOSPITAL ENCOUNTER (INPATIENT)
Facility: MEDICAL CENTER | Age: 23
LOS: 4 days | End: 2018-06-25
Attending: OBSTETRICS & GYNECOLOGY | Admitting: OBSTETRICS & GYNECOLOGY
Payer: MEDICAID

## 2018-06-21 VITALS
RESPIRATION RATE: 20 BRPM | SYSTOLIC BLOOD PRESSURE: 114 MMHG | HEART RATE: 85 BPM | HEIGHT: 64 IN | TEMPERATURE: 97.8 F | BODY MASS INDEX: 23.05 KG/M2 | DIASTOLIC BLOOD PRESSURE: 78 MMHG | WEIGHT: 135 LBS

## 2018-06-21 DIAGNOSIS — Z98.891 HX OF CESAREAN SECTION: ICD-10-CM

## 2018-06-21 LAB
ABO GROUP BLD: NORMAL
ABO GROUP BLD: NORMAL
APPEARANCE UR: CLEAR
BASOPHILS # BLD AUTO: 0.2 % (ref 0–1.8)
BASOPHILS # BLD: 0.02 K/UL (ref 0–0.12)
BLD GP AB SCN SERPL QL: NORMAL
COLOR UR AUTO: ABNORMAL
EOSINOPHIL # BLD AUTO: 0.06 K/UL (ref 0–0.51)
EOSINOPHIL NFR BLD: 0.7 % (ref 0–6.9)
ERYTHROCYTE [DISTWIDTH] IN BLOOD BY AUTOMATED COUNT: 42.5 FL (ref 35.9–50)
ERYTHROCYTE [DISTWIDTH] IN BLOOD BY AUTOMATED COUNT: 43.6 FL (ref 35.9–50)
GLUCOSE UR QL STRIP.AUTO: NEGATIVE MG/DL
HCT VFR BLD AUTO: 28.1 % (ref 37–47)
HCT VFR BLD AUTO: 29.5 % (ref 37–47)
HGB BLD-MCNC: 8.8 G/DL (ref 12–16)
HGB BLD-MCNC: 9.3 G/DL (ref 12–16)
HOLDING TUBE BB 8507: NORMAL
IMM GRANULOCYTES # BLD AUTO: 0.14 K/UL (ref 0–0.11)
IMM GRANULOCYTES NFR BLD AUTO: 1.6 % (ref 0–0.9)
KETONES UR QL STRIP.AUTO: NEGATIVE MG/DL
LEUKOCYTE ESTERASE UR QL STRIP.AUTO: ABNORMAL
LYMPHOCYTES # BLD AUTO: 1.68 K/UL (ref 1–4.8)
LYMPHOCYTES NFR BLD: 19.7 % (ref 22–41)
MCH RBC QN AUTO: 25.7 PG (ref 27–33)
MCH RBC QN AUTO: 25.7 PG (ref 27–33)
MCHC RBC AUTO-ENTMCNC: 31.3 G/DL (ref 33.6–35)
MCHC RBC AUTO-ENTMCNC: 31.5 G/DL (ref 33.6–35)
MCV RBC AUTO: 81.5 FL (ref 81.4–97.8)
MCV RBC AUTO: 82.2 FL (ref 81.4–97.8)
MONOCYTES # BLD AUTO: 0.61 K/UL (ref 0–0.85)
MONOCYTES NFR BLD AUTO: 7.2 % (ref 0–13.4)
NEUTROPHILS # BLD AUTO: 6.01 K/UL (ref 2–7.15)
NEUTROPHILS NFR BLD: 70.6 % (ref 44–72)
NITRITE UR QL STRIP.AUTO: NEGATIVE
NRBC # BLD AUTO: 0.07 K/UL
NRBC BLD-RTO: 0.8 /100 WBC
PH UR STRIP.AUTO: 7.5 [PH]
PLATELET # BLD AUTO: 160 K/UL (ref 164–446)
PLATELET # BLD AUTO: 173 K/UL (ref 164–446)
PMV BLD AUTO: 10.3 FL (ref 9–12.9)
PMV BLD AUTO: 9.9 FL (ref 9–12.9)
PROT UR QL STRIP: 100 MG/DL
RBC # BLD AUTO: 3.42 M/UL (ref 4.2–5.4)
RBC # BLD AUTO: 3.62 M/UL (ref 4.2–5.4)
RBC UR QL AUTO: NEGATIVE
RH BLD: NORMAL
RH BLD: NORMAL
SP GR UR: 1.02
WBC # BLD AUTO: 8.5 K/UL (ref 4.8–10.8)
WBC # BLD AUTO: 9.8 K/UL (ref 4.8–10.8)

## 2018-06-21 PROCEDURE — 700105 HCHG RX REV CODE 258: Performed by: ANESTHESIOLOGY

## 2018-06-21 PROCEDURE — 700111 HCHG RX REV CODE 636 W/ 250 OVERRIDE (IP): Performed by: NURSE PRACTITIONER

## 2018-06-21 PROCEDURE — 304964 HCHG RECOVERY ROOM TIME 1HR: Performed by: OBSTETRICS & GYNECOLOGY

## 2018-06-21 PROCEDURE — 700111 HCHG RX REV CODE 636 W/ 250 OVERRIDE (IP): Performed by: ANESTHESIOLOGY

## 2018-06-21 PROCEDURE — 59514 CESAREAN DELIVERY ONLY: CPT

## 2018-06-21 PROCEDURE — 700102 HCHG RX REV CODE 250 W/ 637 OVERRIDE(OP): Performed by: ANESTHESIOLOGY

## 2018-06-21 PROCEDURE — 86850 RBC ANTIBODY SCREEN: CPT

## 2018-06-21 PROCEDURE — 304966 HCHG RECOVERY SVSC TIME ADDL 1/2 HR: Performed by: OBSTETRICS & GYNECOLOGY

## 2018-06-21 PROCEDURE — 700101 HCHG RX REV CODE 250

## 2018-06-21 PROCEDURE — 700102 HCHG RX REV CODE 250 W/ 637 OVERRIDE(OP): Performed by: OBSTETRICS & GYNECOLOGY

## 2018-06-21 PROCEDURE — 86901 BLOOD TYPING SEROLOGIC RH(D): CPT

## 2018-06-21 PROCEDURE — 700112 HCHG RX REV CODE 229: Performed by: OBSTETRICS & GYNECOLOGY

## 2018-06-21 PROCEDURE — 770002 HCHG ROOM/CARE - OB PRIVATE (112)

## 2018-06-21 PROCEDURE — 306828 HCHG ANES-TIME GENERAL: Performed by: OBSTETRICS & GYNECOLOGY

## 2018-06-21 PROCEDURE — 85025 COMPLETE CBC W/AUTO DIFF WBC: CPT

## 2018-06-21 PROCEDURE — 36415 COLL VENOUS BLD VENIPUNCTURE: CPT

## 2018-06-21 PROCEDURE — 305385 HCHG SURGICAL SERVICES 1/4 HOUR: Performed by: OBSTETRICS & GYNECOLOGY

## 2018-06-21 PROCEDURE — A9270 NON-COVERED ITEM OR SERVICE: HCPCS | Performed by: OBSTETRICS & GYNECOLOGY

## 2018-06-21 PROCEDURE — 700111 HCHG RX REV CODE 636 W/ 250 OVERRIDE (IP)

## 2018-06-21 PROCEDURE — 85027 COMPLETE CBC AUTOMATED: CPT

## 2018-06-21 PROCEDURE — 86900 BLOOD TYPING SEROLOGIC ABO: CPT

## 2018-06-21 PROCEDURE — 700105 HCHG RX REV CODE 258

## 2018-06-21 RX ORDER — VITAMIN A ACETATE, BETA CAROTENE, ASCORBIC ACID, CHOLECALCIFEROL, .ALPHA.-TOCOPHEROL ACETATE, DL-, THIAMINE MONONITRATE, RIBOFLAVIN, NIACINAMIDE, PYRIDOXINE HYDROCHLORIDE, FOLIC ACID, CYANOCOBALAMIN, CALCIUM CARBONATE, FERROUS FUMARATE, ZINC OXIDE, CUPRIC OXIDE 3080; 12; 120; 400; 1; 1.84; 3; 20; 22; 920; 25; 200; 27; 10; 2 [IU]/1; UG/1; MG/1; [IU]/1; MG/1; MG/1; MG/1; MG/1; MG/1; [IU]/1; MG/1; MG/1; MG/1; MG/1; MG/1
1 TABLET, FILM COATED ORAL EVERY MORNING
Status: DISCONTINUED | OUTPATIENT
Start: 2018-06-21 | End: 2018-06-22

## 2018-06-21 RX ORDER — OXYCODONE HYDROCHLORIDE AND ACETAMINOPHEN 5; 325 MG/1; MG/1
2 TABLET ORAL EVERY 4 HOURS PRN
Status: DISCONTINUED | OUTPATIENT
Start: 2018-06-21 | End: 2018-06-22

## 2018-06-21 RX ORDER — CITRIC ACID/SODIUM CITRATE 334-500MG
30 SOLUTION, ORAL ORAL ONCE
Status: COMPLETED | OUTPATIENT
Start: 2018-06-21 | End: 2018-06-21

## 2018-06-21 RX ORDER — ONDANSETRON 2 MG/ML
4 INJECTION INTRAMUSCULAR; INTRAVENOUS EVERY 6 HOURS PRN
Status: DISCONTINUED | OUTPATIENT
Start: 2018-06-21 | End: 2018-06-22

## 2018-06-21 RX ORDER — SODIUM CHLORIDE, SODIUM GLUCONATE, SODIUM ACETATE, POTASSIUM CHLORIDE AND MAGNESIUM CHLORIDE 526; 502; 368; 37; 30 MG/100ML; MG/100ML; MG/100ML; MG/100ML; MG/100ML
1500 INJECTION, SOLUTION INTRAVENOUS ONCE
Status: COMPLETED | OUTPATIENT
Start: 2018-06-21 | End: 2018-06-21

## 2018-06-21 RX ORDER — SODIUM CHLORIDE, SODIUM LACTATE, POTASSIUM CHLORIDE, CALCIUM CHLORIDE 600; 310; 30; 20 MG/100ML; MG/100ML; MG/100ML; MG/100ML
INJECTION, SOLUTION INTRAVENOUS PRN
Status: DISCONTINUED | OUTPATIENT
Start: 2018-06-21 | End: 2018-06-25 | Stop reason: HOSPADM

## 2018-06-21 RX ORDER — NALOXONE HYDROCHLORIDE 0.4 MG/ML
0.1 INJECTION, SOLUTION INTRAMUSCULAR; INTRAVENOUS; SUBCUTANEOUS PRN
Status: DISCONTINUED | OUTPATIENT
Start: 2018-06-21 | End: 2018-06-22

## 2018-06-21 RX ORDER — DIPHENHYDRAMINE HYDROCHLORIDE 50 MG/ML
12.5 INJECTION INTRAMUSCULAR; INTRAVENOUS EVERY 6 HOURS PRN
Status: DISCONTINUED | OUTPATIENT
Start: 2018-06-21 | End: 2018-06-22

## 2018-06-21 RX ORDER — OXYTOCIN 10 [USP'U]/ML
10 INJECTION, SOLUTION INTRAMUSCULAR; INTRAVENOUS ONCE
Status: DISCONTINUED | OUTPATIENT
Start: 2018-06-21 | End: 2018-06-21 | Stop reason: HOSPADM

## 2018-06-21 RX ORDER — METOCLOPRAMIDE HYDROCHLORIDE 5 MG/ML
10 INJECTION INTRAMUSCULAR; INTRAVENOUS ONCE
Status: COMPLETED | OUTPATIENT
Start: 2018-06-21 | End: 2018-06-21

## 2018-06-21 RX ORDER — VITAMIN A ACETATE, BETA CAROTENE, ASCORBIC ACID, CHOLECALCIFEROL, .ALPHA.-TOCOPHEROL ACETATE, DL-, THIAMINE MONONITRATE, RIBOFLAVIN, NIACINAMIDE, PYRIDOXINE HYDROCHLORIDE, FOLIC ACID, CYANOCOBALAMIN, CALCIUM CARBONATE, FERROUS FUMARATE, ZINC OXIDE, CUPRIC OXIDE 3080; 12; 120; 400; 1; 1.84; 3; 20; 22; 920; 25; 200; 27; 10; 2 [IU]/1; UG/1; MG/1; [IU]/1; MG/1; MG/1; MG/1; MG/1; MG/1; [IU]/1; MG/1; MG/1; MG/1; MG/1; MG/1
1 TABLET, FILM COATED ORAL EVERY MORNING
Status: DISCONTINUED | OUTPATIENT
Start: 2018-06-21 | End: 2018-06-25 | Stop reason: HOSPADM

## 2018-06-21 RX ORDER — IBUPROFEN 600 MG/1
600 TABLET ORAL EVERY 6 HOURS PRN
Status: DISCONTINUED | OUTPATIENT
Start: 2018-06-22 | End: 2018-06-25 | Stop reason: HOSPADM

## 2018-06-21 RX ORDER — METHYLERGONOVINE MALEATE 0.2 MG/ML
0.2 INJECTION INTRAVENOUS
Status: DISCONTINUED | OUTPATIENT
Start: 2018-06-21 | End: 2018-06-25 | Stop reason: HOSPADM

## 2018-06-21 RX ORDER — MISOPROSTOL 200 UG/1
600 TABLET ORAL
Status: DISCONTINUED | OUTPATIENT
Start: 2018-06-21 | End: 2018-06-25 | Stop reason: HOSPADM

## 2018-06-21 RX ORDER — SODIUM CHLORIDE, SODIUM LACTATE, POTASSIUM CHLORIDE, CALCIUM CHLORIDE 600; 310; 30; 20 MG/100ML; MG/100ML; MG/100ML; MG/100ML
INJECTION, SOLUTION INTRAVENOUS PRN
Status: DISCONTINUED | OUTPATIENT
Start: 2018-06-21 | End: 2018-06-22

## 2018-06-21 RX ORDER — SODIUM CHLORIDE, SODIUM LACTATE, POTASSIUM CHLORIDE, CALCIUM CHLORIDE 600; 310; 30; 20 MG/100ML; MG/100ML; MG/100ML; MG/100ML
INJECTION, SOLUTION INTRAVENOUS
Status: COMPLETED
Start: 2018-06-21 | End: 2018-06-21

## 2018-06-21 RX ORDER — OXYCODONE AND ACETAMINOPHEN 10; 325 MG/1; MG/1
1 TABLET ORAL EVERY 4 HOURS PRN
Status: DISCONTINUED | OUTPATIENT
Start: 2018-06-21 | End: 2018-06-21

## 2018-06-21 RX ORDER — OXYCODONE HYDROCHLORIDE AND ACETAMINOPHEN 5; 325 MG/1; MG/1
1 TABLET ORAL EVERY 4 HOURS PRN
Status: DISCONTINUED | OUTPATIENT
Start: 2018-06-21 | End: 2018-06-22

## 2018-06-21 RX ORDER — KETOROLAC TROMETHAMINE 30 MG/ML
30 INJECTION, SOLUTION INTRAMUSCULAR; INTRAVENOUS EVERY 6 HOURS
Status: DISCONTINUED | OUTPATIENT
Start: 2018-06-21 | End: 2018-06-21 | Stop reason: SINTOL

## 2018-06-21 RX ORDER — SIMETHICONE 80 MG
80 TABLET,CHEWABLE ORAL 4 TIMES DAILY PRN
Status: DISCONTINUED | OUTPATIENT
Start: 2018-06-21 | End: 2018-06-25 | Stop reason: HOSPADM

## 2018-06-21 RX ORDER — SODIUM CHLORIDE, SODIUM LACTATE, POTASSIUM CHLORIDE, CALCIUM CHLORIDE 600; 310; 30; 20 MG/100ML; MG/100ML; MG/100ML; MG/100ML
INJECTION, SOLUTION INTRAVENOUS CONTINUOUS
Status: DISCONTINUED | OUTPATIENT
Start: 2018-06-21 | End: 2018-06-21 | Stop reason: HOSPADM

## 2018-06-21 RX ORDER — MORPHINE SULFATE 4 MG/ML
4 INJECTION, SOLUTION INTRAMUSCULAR; INTRAVENOUS
Status: DISCONTINUED | OUTPATIENT
Start: 2018-06-21 | End: 2018-06-22

## 2018-06-21 RX ORDER — ONDANSETRON 4 MG/1
4 TABLET, ORALLY DISINTEGRATING ORAL EVERY 6 HOURS PRN
Status: DISCONTINUED | OUTPATIENT
Start: 2018-06-21 | End: 2018-06-22

## 2018-06-21 RX ORDER — KETOROLAC TROMETHAMINE 30 MG/ML
30 INJECTION, SOLUTION INTRAMUSCULAR; INTRAVENOUS EVERY 6 HOURS
Status: DISCONTINUED | OUTPATIENT
Start: 2018-06-21 | End: 2018-06-21

## 2018-06-21 RX ORDER — ONDANSETRON 2 MG/ML
4 INJECTION INTRAMUSCULAR; INTRAVENOUS EVERY 6 HOURS PRN
Status: DISCONTINUED | OUTPATIENT
Start: 2018-06-22 | End: 2018-06-25 | Stop reason: HOSPADM

## 2018-06-21 RX ORDER — OXYCODONE HYDROCHLORIDE AND ACETAMINOPHEN 5; 325 MG/1; MG/1
1 TABLET ORAL EVERY 4 HOURS PRN
Status: DISCONTINUED | OUTPATIENT
Start: 2018-06-22 | End: 2018-06-25 | Stop reason: HOSPADM

## 2018-06-21 RX ORDER — MORPHINE SULFATE 4 MG/ML
4 INJECTION, SOLUTION INTRAMUSCULAR; INTRAVENOUS
Status: DISCONTINUED | OUTPATIENT
Start: 2018-06-22 | End: 2018-06-25 | Stop reason: HOSPADM

## 2018-06-21 RX ORDER — OXYCODONE AND ACETAMINOPHEN 10; 325 MG/1; MG/1
1 TABLET ORAL EVERY 4 HOURS PRN
Status: DISCONTINUED | OUTPATIENT
Start: 2018-06-21 | End: 2018-06-25 | Stop reason: HOSPADM

## 2018-06-21 RX ORDER — DOCUSATE SODIUM 100 MG/1
100 CAPSULE, LIQUID FILLED ORAL 2 TIMES DAILY PRN
Status: DISCONTINUED | OUTPATIENT
Start: 2018-06-21 | End: 2018-06-25 | Stop reason: HOSPADM

## 2018-06-21 RX ORDER — MISOPROSTOL 200 UG/1
600 TABLET ORAL
Status: DISCONTINUED | OUTPATIENT
Start: 2018-06-21 | End: 2018-06-22

## 2018-06-21 RX ORDER — ONDANSETRON 4 MG/1
4 TABLET, ORALLY DISINTEGRATING ORAL EVERY 6 HOURS PRN
Status: DISCONTINUED | OUTPATIENT
Start: 2018-06-22 | End: 2018-06-25 | Stop reason: HOSPADM

## 2018-06-21 RX ORDER — MISOPROSTOL 200 UG/1
800 TABLET ORAL
Status: DISCONTINUED | OUTPATIENT
Start: 2018-06-21 | End: 2018-06-21 | Stop reason: HOSPADM

## 2018-06-21 RX ORDER — METHYLERGONOVINE MALEATE 0.2 MG/ML
0.2 INJECTION INTRAVENOUS
Status: DISCONTINUED | OUTPATIENT
Start: 2018-06-21 | End: 2018-06-22

## 2018-06-21 RX ORDER — CARBOPROST TROMETHAMINE 250 UG/ML
250 INJECTION, SOLUTION INTRAMUSCULAR
Status: DISCONTINUED | OUTPATIENT
Start: 2018-06-21 | End: 2018-06-22

## 2018-06-21 RX ADMIN — SODIUM CHLORIDE, SODIUM GLUCONATE, SODIUM ACETATE, POTASSIUM CHLORIDE AND MAGNESIUM CHLORIDE 1000 ML: 526; 502; 368; 37; 30 INJECTION, SOLUTION INTRAVENOUS at 04:29

## 2018-06-21 RX ADMIN — OXYCODONE HYDROCHLORIDE AND ACETAMINOPHEN 1 TABLET: 10; 325 TABLET ORAL at 20:12

## 2018-06-21 RX ADMIN — FAMOTIDINE 20 MG: 10 INJECTION INTRAVENOUS at 04:47

## 2018-06-21 RX ADMIN — KETOROLAC TROMETHAMINE 30 MG: 30 INJECTION, SOLUTION INTRAMUSCULAR at 11:47

## 2018-06-21 RX ADMIN — SODIUM CITRATE AND CITRIC ACID MONOHYDRATE 30 ML: 500; 334 SOLUTION ORAL at 04:47

## 2018-06-21 RX ADMIN — METOCLOPRAMIDE 10 MG: 5 INJECTION, SOLUTION INTRAMUSCULAR; INTRAVENOUS at 04:47

## 2018-06-21 RX ADMIN — OXYCODONE HYDROCHLORIDE AND ACETAMINOPHEN 1 TABLET: 10; 325 TABLET ORAL at 16:21

## 2018-06-21 RX ADMIN — DIPHENHYDRAMINE HYDROCHLORIDE 12.5 MG: 50 INJECTION, SOLUTION INTRAMUSCULAR; INTRAVENOUS at 20:09

## 2018-06-21 RX ADMIN — Medication 125 ML/HR: at 06:38

## 2018-06-21 RX ADMIN — DOCUSATE SODIUM 100 MG: 100 CAPSULE ORAL at 16:21

## 2018-06-21 RX ADMIN — SODIUM CHLORIDE, SODIUM LACTATE, POTASSIUM CHLORIDE, CALCIUM CHLORIDE: 600; 310; 30; 20 INJECTION, SOLUTION INTRAVENOUS at 04:17

## 2018-06-21 RX ADMIN — DIPHENHYDRAMINE HYDROCHLORIDE 12.5 MG: 50 INJECTION, SOLUTION INTRAMUSCULAR; INTRAVENOUS at 12:29

## 2018-06-21 RX ADMIN — NALOXONE HYDROCHLORIDE 0.4 MG: 0.4 INJECTION, SOLUTION INTRAMUSCULAR; INTRAVENOUS; SUBCUTANEOUS at 23:54

## 2018-06-21 RX ADMIN — SODIUM CHLORIDE, POTASSIUM CHLORIDE, SODIUM LACTATE AND CALCIUM CHLORIDE: 600; 310; 30; 20 INJECTION, SOLUTION INTRAVENOUS at 04:17

## 2018-06-21 RX ADMIN — Medication 2000 ML/HR: at 05:30

## 2018-06-21 ASSESSMENT — PAIN SCALES - GENERAL
PAINLEVEL_OUTOF10: 0
PAINLEVEL_OUTOF10: 3
PAINLEVEL_OUTOF10: 0
PAINLEVEL_OUTOF10: 7
PAINLEVEL_OUTOF10: 0
PAINLEVEL_OUTOF10: 0

## 2018-06-21 ASSESSMENT — COPD QUESTIONNAIRES
HAVE YOU SMOKED AT LEAST 100 CIGARETTES IN YOUR ENTIRE LIFE: NO/DON'T KNOW
COPD SCREENING SCORE: 0
DO YOU EVER COUGH UP ANY MUCUS OR PHLEGM?: NO/ONLY WITH OCCASIONAL COLDS OR INFECTIONS
DURING THE PAST 4 WEEKS HOW MUCH DID YOU FEEL SHORT OF BREATH: NONE/LITTLE OF THE TIME
IN THE PAST 12 MONTHS DO YOU DO LESS THAN YOU USED TO BECAUSE OF YOUR BREATHING PROBLEMS: DISAGREE/UNSURE

## 2018-06-21 ASSESSMENT — LIFESTYLE VARIABLES
ALCOHOL_USE: NO
EVER_SMOKED: NEVER
ALCOHOL_USE: NO

## 2018-06-21 ASSESSMENT — PATIENT HEALTH QUESTIONNAIRE - PHQ9
1. LITTLE INTEREST OR PLEASURE IN DOING THINGS: NOT AT ALL
SUM OF ALL RESPONSES TO PHQ9 QUESTIONS 1 AND 2: 0
2. FEELING DOWN, DEPRESSED, IRRITABLE, OR HOPELESS: NOT AT ALL

## 2018-06-21 NOTE — OR SURGEON
Immediate Post OP Note    PreOp Diagnosis: IUP at 39 1/7 weeks active labor, previous csection X 2    PostOp Diagnosis: same    Procedure(s):  REPEAT C SECTION - Wound Class: Clean Contaminated    Surgeon(s):  MERCEDEZ Martinez CNM    Anesthesiologist/Type of Anesthesia:  Anesthesiologist: Vishnu Benson M.D./Spinal    Surgical Staff:  Circulator: Jaylene Reilly R.N.  Scrub Person: Rosa Maria Joshua  First Assist: DONALDO Viveros    Specimens removed if any:  * No specimens in log *    Estimated Blood Loss: 600 cc    Findings: female presentation vertex APGAR 8/9  Uterus Normal, Tubes Normal, ovaries Normal  Thin MIRNA    Complications: none        2018 6:00 AM Jenni Worrell M.D.

## 2018-06-21 NOTE — PROGRESS NOTES
0430 Report received from ILA Euceda RN. POC discussed.   0502 Pt transferred to OR 2 via bed with side rails up.  0529 Delivery of baby girl, apgars 8,9. Weight 3320 g.   0530 Manual delivery of intact placenta. .  0604 Transferred to PACU in stable condition.   0700 Report given to TRACIE Machado RN.

## 2018-06-21 NOTE — PROGRESS NOTES
0355 Pt presents via [facility] with complaints of SROM and UC's. Pt has a history of C/S x 2. Please reference MR# [id number] for prenatal labs and US.     0358 TOCO and EFM applied, VSS. Pt states her water broke around 0230.

## 2018-06-21 NOTE — PROGRESS NOTES
0700: Report received from AIDEN Reilly RN. POC discussed. Pt and infant in PACU. VSS. Denies pain. Lochia WNL. Pitocin infusing. Abdominal dressing c/d/I. Echeverria to gravity with moderate, clear output.  0725: Pt and infant transferred to  via gurney. Report given to WHIT Cardenas. POC discussed.

## 2018-06-21 NOTE — DISCHARGE PLANNING
:     Referral: Adoption     Intervention:  Met with Ines KEANE who is working with [employer] [facility] [facility] for the plan of adoption.  The Adoption SW is Deborah Agustin (cell: [phone]) and e-mail: [email or web address].  LAKHWINDER signed the Release of Information and would like the baby to discharge directly to the adoptive parents after she signs relinquishment.  Spoke with Deborah Agustin and relinquishment will be signed on Sunday.  The adoptive parents are Vazquez Palma and Neetu Palma from [place].  They plan on arriving on Saturday or Sunday.       JOSE will e-mail medical records and birth confirmation to Deborah Agustin.  Infant's UDS was negative.

## 2018-06-21 NOTE — H&P
History and Physical    Nunu Friedman is a 22 y.o. female  -Para:     Gestational Age:  Unknown  Admitted for:   Active Labor and Repeat  repeat  Admitted to  [facility] Labor and Delivery.  Patient received prenatal care: No prenatal care  LMP 17 which she is fairly sure of that correlates with 20 week TVUS in ED.   Reports no medical issues  Reports no other surgeries besides C/S. Last c/s in 2016.   Denies any drugs, tobacco and no alcohol in pregnancy although she had positive marijuana in drug screen.   Denies any infections in pregnancy.     HPI: Patient is admitted with the above mentioned Chief Complaint and States   Loss of fluid:   Positive at 02:30 with mec  Abdominal Pain:  negative  Uterine Contractions:  positive  Vaginal Bleeding:  negative  Fetal Movement:  normal  Patient denies fever, chills, nausea, vomiting , headache, visual disturbance, or dysuria  Patient's last menstrual period was 2018.  Estimated Date of Delivery: None noted.  Final PEPE: Not found.    There are no active problems to display for this patient.      Admitting DX: Pregnancy   Labor and delivery, indication for care  Pregnancy Complications:  No prenatal care  OB Risk Factors:   No prenatal care  Labor State:    Active phase labor.    History:   has a past medical history of Multiple sclerosis ([facility]); Schizophrenia ([facility]); and Seizure disorder ([facility]).     has no past surgical history on file.    OB History    Para Term  AB Living   1             SAB TAB Ectopic Molar Multiple Live Births                    # Outcome Date GA Lbr Serg/2nd Weight Sex Delivery Anes PTL Lv   1 Current                   Medications:  No current facility-administered medications on file prior to encounter.      Current Outpatient Prescriptions on File Prior to Encounter   Medication Sig Dispense Refill   • ondansetron (ZOFRAN ODT) 4 MG TABLET DISPERSIBLE Take 1 Tab by mouth every 8 hours as needed. 20  "Tab 0       Allergies:  Patient has no known allergies.    ROS:   Neuro: negative    Cardiovascular: negative  Gastro intestinal: negative  Genitourinary: negative            Physical Exam:  Ht 1.626 m (5' 4\")   Wt 61.2 kg (135 lb)   LMP 05/14/2018   BMI 23.17 kg/m²   Constitutional: healthy-appearing, Well-developed  No JVD: while supine  HEENT: PERRLA  Breast Exam: negative  Cardio: regular rate and rhythm, S1, S2 normal, no murmur, click, rub or gallop  Lung: unlabored respirations, no intercostal retractions or accessory muscle use  Abdomen: abdomen is soft without significant tenderness, masses, organomegaly or guarding  Extremity: extremities, peripheral pulses and reflexes normal, no edema, redness or tenderness in the calves or thighs    Cervical Exam: 100%  Cervix Dilatation: 2  Station: negative 2  Pelvis: Normal  Fetal Assessment: Fetal heart variability: moderate  Estimated Fetal Weight: 2500 - 3000g      Labs:      Prenatal Results    The patient does not have a working PEPE. Some results will not display without a working PEPE.   1st Trimester     Test Value Date Time    ABO       RH       Antibody       CBC/PLT/DIFF       HGB       Platelets       HGB A1C        1 Hr GCT       3 Hr GTT       Rubella       RPR       Urine Culture       24 Urine Protein        24 Urine Creatinine        HBsAg       Hep CAB        HIV       Gonorrhea       Chlamydia       TSH        Free T4         TB       Pap       SYPHILUS TREP QUAL [id number] [[id number]][             2nd Trimester     Test Value Date Time    HCT       HGB       1 Hr GCT       3 Hr GTT             24-28 Weeks     Test Value Date Time    1 Hr GCT        TSH        Free T4        24 Urine Protein       24 Urine Creatinine       BUN       Creatinine       GFR       AST       ALT       Uric Acid       LDH             3rd Trimester     Test Value Date Time    HCT       HGB       TSH       Free T4       24 Hr Urine Protein       24 Hr Urine Creatinine       " SYPHILUS TREP QUAL             35-37 Weeks     Test Value Date Time    GBS PCR LB       GBS PCR             Genetic Screening     Test Value Date Time    Cystic Fibrosis       AFP Quad       Sickle Cell                     Assessment:  Gestational Age:  Unknown  Labor State:   Labor, Active  Risk Factors:   No prenatal care  Pregnancy Complications: no prenatal care    There are no active problems to display for this patient.      Plan:   Admitted for: Active Labor and Repeat  repeat  RCS  CBC  routine urinalysis  Antibiotics: For c/s; GBS negative   Prenatal labs done 18 show all wnl except anemia      NORBERT Viveros.

## 2018-06-21 NOTE — PROGRESS NOTES
0400: pt arrived via [facility] c/o srom at 0230.  Meconium seen leaking out of patient's vagina.  sve 2/100/-1.  Pt has had minimal prenatal care but an early u/s confirms due date of 6/27.  Pt states she has a history of c/s x2.  efm and toco applied. vss.  Report to sunshine sellers cnm, pt prepped for a c/s.  0415: dr. rosenthal at bs, consented the patient for anesthesia.  0430: pt prepped for c section.  Report to sunshine watts rn.  Dr. gambino at bs, consented the patient for repeat c section.

## 2018-06-21 NOTE — PROGRESS NOTES
c/o itchiness after giving toradol ,Interventions rendered, see MAR for reference.  Referred to MD Dr Knight and ordered to bella toradol.

## 2018-06-21 NOTE — PROGRESS NOTES
Transferred In from [facility] via [facility], report received in the room with pt listening, confirmed has wrong name in the record.  [place] with 2men assistance.  Mom and baby band checked.

## 2018-06-22 LAB
ERYTHROCYTE [DISTWIDTH] IN BLOOD BY AUTOMATED COUNT: 43.6 FL (ref 35.9–50)
HCT VFR BLD AUTO: 26.2 % (ref 37–47)
HGB BLD-MCNC: 8.1 G/DL (ref 12–16)
MCH RBC QN AUTO: 25.7 PG (ref 27–33)
MCHC RBC AUTO-ENTMCNC: 30.9 G/DL (ref 33.6–35)
MCV RBC AUTO: 83.2 FL (ref 81.4–97.8)
PLATELET # BLD AUTO: 168 K/UL (ref 164–446)
PMV BLD AUTO: 10.4 FL (ref 9–12.9)
RBC # BLD AUTO: 3.15 M/UL (ref 4.2–5.4)
WBC # BLD AUTO: 12.2 K/UL (ref 4.8–10.8)

## 2018-06-22 PROCEDURE — 700102 HCHG RX REV CODE 250 W/ 637 OVERRIDE(OP): Performed by: NURSE PRACTITIONER

## 2018-06-22 PROCEDURE — A9270 NON-COVERED ITEM OR SERVICE: HCPCS | Performed by: NURSE PRACTITIONER

## 2018-06-22 PROCEDURE — 85027 COMPLETE CBC AUTOMATED: CPT

## 2018-06-22 PROCEDURE — A9270 NON-COVERED ITEM OR SERVICE: HCPCS | Performed by: OBSTETRICS & GYNECOLOGY

## 2018-06-22 PROCEDURE — 36415 COLL VENOUS BLD VENIPUNCTURE: CPT

## 2018-06-22 PROCEDURE — 700102 HCHG RX REV CODE 250 W/ 637 OVERRIDE(OP): Performed by: OBSTETRICS & GYNECOLOGY

## 2018-06-22 PROCEDURE — 700112 HCHG RX REV CODE 229: Performed by: OBSTETRICS & GYNECOLOGY

## 2018-06-22 PROCEDURE — 770002 HCHG ROOM/CARE - OB PRIVATE (112)

## 2018-06-22 RX ORDER — FERROUS SULFATE 325(65) MG
325 TABLET ORAL 2 TIMES DAILY WITH MEALS
Status: DISCONTINUED | OUTPATIENT
Start: 2018-06-22 | End: 2018-06-25 | Stop reason: HOSPADM

## 2018-06-22 RX ADMIN — IBUPROFEN 600 MG: 600 TABLET, FILM COATED ORAL at 15:47

## 2018-06-22 RX ADMIN — Medication 325 MG: at 17:49

## 2018-06-22 RX ADMIN — OXYCODONE HYDROCHLORIDE AND ACETAMINOPHEN 1 TABLET: 10; 325 TABLET ORAL at 09:01

## 2018-06-22 RX ADMIN — OXYCODONE HYDROCHLORIDE AND ACETAMINOPHEN 1 TABLET: 10; 325 TABLET ORAL at 13:11

## 2018-06-22 RX ADMIN — DOCUSATE SODIUM 100 MG: 100 CAPSULE ORAL at 09:01

## 2018-06-22 RX ADMIN — Medication 325 MG: at 09:01

## 2018-06-22 RX ADMIN — OXYCODONE HYDROCHLORIDE AND ACETAMINOPHEN 1 TABLET: 10; 325 TABLET ORAL at 02:59

## 2018-06-22 RX ADMIN — IBUPROFEN 600 MG: 600 TABLET, FILM COATED ORAL at 10:00

## 2018-06-22 RX ADMIN — OXYCODONE HYDROCHLORIDE AND ACETAMINOPHEN 1 TABLET: 10; 325 TABLET ORAL at 17:49

## 2018-06-22 RX ADMIN — Medication 1 TABLET: at 09:01

## 2018-06-22 ASSESSMENT — PAIN SCALES - GENERAL
PAINLEVEL_OUTOF10: 5
PAINLEVEL_OUTOF10: 7
PAINLEVEL_OUTOF10: 5
PAINLEVEL_OUTOF10: 5
PAINLEVEL_OUTOF10: 7
PAINLEVEL_OUTOF10: 3
PAINLEVEL_OUTOF10: 7
PAINLEVEL_OUTOF10: 7
PAINLEVEL_OUTOF10: 3
PAINLEVEL_OUTOF10: 7
PAINLEVEL_OUTOF10: 5
PAINLEVEL_OUTOF10: 3

## 2018-06-22 NOTE — PROGRESS NOTES
Post Partum Progress Note    Name:   Ines Friedman  Also note chart MR [id number]  Date/Time:  2018 - 6:49 AM  Chief Admitting Dx:  Pregnancy  Labor and delivery, indication for care  Delivery Type:   for repeat  Post-Op/Post Partum Days #:  1    Subjective:  Abdominal pain: no  Ambulating:   yes  Tolerating liquids:  yes  Tolerating food:  yes common adult  Flatus:   yes  BM:    no  Bleeding:   without any bleeding  Voiding:   yes  Dizziness:   no  Feeding:   bottle - Similac with iron    Vitals:    18 1600 18 2000 18 0000 18 0400   BP: 116/59 100/58 106/53 109/67   Pulse: 89 65 86 83   Resp:    Temp: 37 °C (98.6 °F) 36.5 °C (97.7 °F) 36.6 °C (97.8 °F) 36.4 °C (97.6 °F)   SpO2: 98%      Weight:       Height:           Exam:  Breast: Tenderness no  Abdomen: Abdomen soft, non-tender. BS normal. No masses,  No organomegaly  Fundal Tenderness:  yes and no  Fundus Firm: yes  Incision: dry and intact  Below umbilicus: yes  Perineum: perineum intact  Lochia: mild  Extremities: Normal extremities, peripheral pulses and reflexes normal    Meds:  Current Facility-Administered Medications   Medication Dose   • ferrous sulfate tablet 325 mg  325 mg   • oxytocin (PITOCIN) infusion (for postpartum)  2,000 mL/hr    Followed by   • oxytocin (PITOCIN) infusion (for postpartum)   mL/hr   • LR infusion     • miSOPROStol (CYTOTEC) tablet 600 mcg  600 mcg   • methylergonovine (METHERGINE) injection 0.2 mg  0.2 mg   • docusate sodium (COLACE) capsule 100 mg  100 mg   • prenatal plus vitamin (STUARTNATAL 1+1) 27-1 MG tablet 1 Tab  1 Tab   • simethicone (MYLICON) chewable tab 80 mg  80 mg   • ibuprofen (MOTRIN) tablet 600 mg  600 mg   • oxyCODONE-acetaminophen (PERCOCET) 5-325 MG per tablet 1 Tab  1 Tab   • oxyCODONE-acetaminophen (PERCOCET-10)  MG per tablet 1 Tab  1 Tab   • morphine (pf) 4 mg/ml injection 4 mg  4 mg   • ondansetron (ZOFRAN) syringe/vial injection 4 mg  4 mg     Or   • ondansetron (ZOFRAN ODT) dispertab 4 mg  4 mg   • naloxone 0.4 mg in NS 1,000 mL  0.4 mg       Labs:   Recent Labs      18   0427   WBC  12.2*   RBC  3.15*   HEMOGLOBIN  8.1*   HEMATOCRIT  26.2*   MCV  83.2   MCH  25.7*   MCHC  30.9*   RDW  43.6   PLATELETCT  168   MPV  10.4       Assessment:  Chief Admitting Dx:  Pregnancy  Labor and delivery, indication for care  Delivery Type:   for repeat  Tubal Ligation:  no    Plan:  Continue routine post partum care.  Patient will be adopting out. Does not wish to breast.     ANGELICA MastP.

## 2018-06-22 NOTE — PROGRESS NOTES
8629- Report received from WHIT Shea.  Assumed care of patient.  Patient sleeping.  0855- Patient sitting up in bedside chair.  Patient medicated for c/o incisional pain.  0950- Patient assessment done.  Patient stated that she is voiding without difficulty and passing flatus.  Patient denied dizziness and stated that she is walking without difficulty.  Discussed pain management plan and patient prefers to be offered pain medication as it becomes available.  Reviewed plan of care.  Patient instructed to ambulate in hallways.  Patient instructed to use incentive spirometer hourly.  Patient verbalized understanding.  1100- IV discontinued by Anna [employer] student nurse, with this RN present.  IV cathlon intact.  1547- Patient back in room after ambulating in hallways.  Patient ambulated with steady gait.  Patient medicated for c/o incisional pain.  7526- Discharge education reviewed with patient who verbalized understanding.

## 2018-06-22 NOTE — PROGRESS NOTES
Jacki hernandez and escorted to the restroom to do josé antonio care.  Advises to drink lots of fluids and expected will use restroom at 8pm-10pm

## 2018-06-22 NOTE — PROGRESS NOTES
Pt care assumed.  Assessment completed. Incision covered with tegaderm, cdi.  Medicated pain per MAR.  Pt having sever c/o itching and benadryl ineffective.  Phnd MD and recvd orders for Narcan continuous drip yeilding positive results.  IV stopped.  Pt up self and voiding.  Q2 rounds in place, call light within reach

## 2018-06-22 NOTE — OP REPORT
DATE OF SERVICE:  2018    PREOPERATIVE DIAGNOSES:  Intrauterine pregnancy at 39 weeks and 1/7 days with   history of previous  section x2, no prenatal care and active labor   with spontaneous rupture of membranes.    POSTOPERATIVE DIAGNOSES:  Intrauterine pregnancy at 39 weeks and 1/7 days with   history of previous  section x2, no prenatal care and active labor   with spontaneous rupture of membranes.    PROCEDURE PERFORMED:  Repeat low transverse .    SURGEON:  Jenni Worrell MD    ANESTHESIOLOGIST:  Vishnu Benson MD    ANESTHESIA:  Spinal.    ESTIMATED BLOOD LOSS:  600 mL.    INTRAOPERATIVE FINDINGS:  A female infant in vertex presentation with Apgars   of 8 and 9.  Normal uterus, tubes, and ovaries.  Of note, the lower uterine   segment had a window of approximately 5x5 cm.    OPERATIVE PROCEDURE:  After informed consent had been obtained, the patient   was taken to the operating room where spinal anesthesia was then found to be   adequate.  She was prepped and draped in the usual sterile fashion.  A   Pfannenstiel skin incision made around the previous incision and the incision   was then removed.  Incision was carried down to the underlying layer of fascia   with the knife.  The fascia was nicked in the midline, elevated and the   fascial incision extended laterally on both sides with Edmondson scissors.  Rectus   muscles were then dissected off the posterior aspect of the fascia both   superiorly and inferiorly, they were then  in the midline.  The   peritoneum was entered bluntly and the peritoneal incision then extended   superiorly and inferiorly with good visualization of bowel and bladder.    Vesicouterine peritoneum identified.  A bladder flap created.  The uterus was   incised in a low transverse fashion and extended bluntly.  The female infant   was then delivered through the incision.  The cord was clamped x2 and cut and   the infant handed to awaiting  nursing staff.  The placenta delivered manually.    The uterus was then exteriorized, cleared of all clots and debris with a   moist lap sponge.  The uterus was closed with #1 chromic in a running locked   fashion.  No second layer was placed.  The abdomen was irrigated, cleared of   all clots and debris with a moist lap sponge.  Hemostasis was noted to be   adequate at the hysterotomy site.  The peritoneum was then closed with 0   Vicryl.  The muscles reapproximated with a Vicryl stitch.  The fascia closed   with a 0 Vicryl in a running fashion.  The subcutaneous layers were checked   for hemostasis, which was noted to be adequate, reapproximated with a 2-0   plain and the skin was closed in a subcuticular fashion with a 4-0 Vicryl and   a Prineo was placed on top.       ____________________________________     MD STEPHANIE DELONG / TYE    DD:  06/21/2018 06:10:52  DT:  06/21/2018 06:36:48    D#:  [id number]  Job#:  [id number]

## 2018-06-22 NOTE — DISCHARGE PLANNING
:     Received e-mail from Deborah Agustin asking for the medical records and birth confirmation to be faxed to her at [phone].  Records were faxed.     Plan:  Check-in with Deborah over the weekend (cell: [phone]) to confirm the plan for relinquishment to be signed on Sunday, 6/24.  Once relinquishment has been signed, infant to be discharged to adoptive parents: Rubio Palma.

## 2018-06-23 PROCEDURE — A9270 NON-COVERED ITEM OR SERVICE: HCPCS | Performed by: OBSTETRICS & GYNECOLOGY

## 2018-06-23 PROCEDURE — 700102 HCHG RX REV CODE 250 W/ 637 OVERRIDE(OP): Performed by: NURSE PRACTITIONER

## 2018-06-23 PROCEDURE — 700112 HCHG RX REV CODE 229: Performed by: OBSTETRICS & GYNECOLOGY

## 2018-06-23 PROCEDURE — 770002 HCHG ROOM/CARE - OB PRIVATE (112)

## 2018-06-23 PROCEDURE — 700102 HCHG RX REV CODE 250 W/ 637 OVERRIDE(OP): Performed by: OBSTETRICS & GYNECOLOGY

## 2018-06-23 PROCEDURE — A9270 NON-COVERED ITEM OR SERVICE: HCPCS | Performed by: NURSE PRACTITIONER

## 2018-06-23 RX ADMIN — OXYCODONE HYDROCHLORIDE AND ACETAMINOPHEN 1 TABLET: 10; 325 TABLET ORAL at 20:31

## 2018-06-23 RX ADMIN — IBUPROFEN 600 MG: 600 TABLET, FILM COATED ORAL at 16:45

## 2018-06-23 RX ADMIN — DOCUSATE SODIUM 100 MG: 100 CAPSULE ORAL at 20:31

## 2018-06-23 RX ADMIN — OXYCODONE HYDROCHLORIDE AND ACETAMINOPHEN 1 TABLET: 10; 325 TABLET ORAL at 16:45

## 2018-06-23 RX ADMIN — Medication 325 MG: at 08:00

## 2018-06-23 RX ADMIN — DOCUSATE SODIUM 100 MG: 100 CAPSULE ORAL at 08:01

## 2018-06-23 RX ADMIN — Medication 1 TABLET: at 08:01

## 2018-06-23 RX ADMIN — IBUPROFEN 600 MG: 600 TABLET, FILM COATED ORAL at 06:28

## 2018-06-23 RX ADMIN — Medication 325 MG: at 19:27

## 2018-06-23 RX ADMIN — OXYCODONE HYDROCHLORIDE AND ACETAMINOPHEN 1 TABLET: 10; 325 TABLET ORAL at 11:47

## 2018-06-23 RX ADMIN — OXYCODONE HYDROCHLORIDE AND ACETAMINOPHEN 1 TABLET: 10; 325 TABLET ORAL at 06:30

## 2018-06-23 ASSESSMENT — PAIN SCALES - GENERAL
PAINLEVEL_OUTOF10: 5
PAINLEVEL_OUTOF10: 8
PAINLEVEL_OUTOF10: 10
PAINLEVEL_OUTOF10: 5
PAINLEVEL_OUTOF10: 4
PAINLEVEL_OUTOF10: 0
PAINLEVEL_OUTOF10: 4
PAINLEVEL_OUTOF10: 7
PAINLEVEL_OUTOF10: 7
PAINLEVEL_OUTOF10: 5

## 2018-06-23 NOTE — PROGRESS NOTES
0707- Bedside report received from WHIT Shea.  Patient denied needs.  0800- Patient assessment done.  Patient stated that she is voiding without difficulty and passing flatus.  Patient denied dizziness and stated that she is walking without difficulty.  Discussed pain management plan and patient prefers to call for pain intervention as needed.  Reviewed plan of care.  Patient instructed to ambulate in hallways.  Patient instructed to use incentive spirometer hourly.  Patient verbalized understanding.  1030- Patient ambulated in hallways with steady gait and tolerated well.

## 2018-06-23 NOTE — PROGRESS NOTES
OT care assumed and assessment completed.  PT is aao and up self.  Medicating pain per mar.  Fundus firm lochia light passing small clots occasionally.  Pt tearful over adoption.  Visitors at bedside.  Pt went for walk off unit, educated on not leaving camous, verbalized understanding.  Very attentive to infant.  Bottle feeding.  All needs addressed.  Call light within reach and q2 rounds in place

## 2018-06-23 NOTE — PROGRESS NOTES
Name:   Ines Friedman   Date/Time:  2018 7:46 AM  Gestational Age:  Unknown  Admit Date:   2018  Admitting Dx:   Pregnancy  Labor and delivery, indication for care    POD# 2 S/P RLTCS    S:  Abdominal pain yes - complains of significant pain  Ambulating   yes  Tolerating PO  yes  Flatus    yes  Bleeding   yes  Voiding   yes   Dizziness   no  Breast feeding  no  Breast tenderness  no    O:  Pulse: 64  Blood Pressure: 108/63     Temp  Av.7 °C (98 °F)  Min: 36.4 °C (97.6 °F)  Max: 36.9 °C (98.4 °F)  Heart: regular rate and rhythm without gallops or murmurs  Lungs: clear bases  Abdomen: flat and soft/ bowelsounds present / incision clean and dry. - fundus and incision appropriately tender  Extremities: non-tender  Catheter: DC'd  No intake or output data in the 24 hours ending 18 0746    A:  POD# 2 S/P RLTCS  Stable/progressing well    P:  Routine C/S Postpartum care, continue pain management, encourage ambulation, anticipate DC POD#3     Malcom Nash D.O.

## 2018-06-24 PROCEDURE — 700102 HCHG RX REV CODE 250 W/ 637 OVERRIDE(OP): Performed by: NURSE PRACTITIONER

## 2018-06-24 PROCEDURE — 700102 HCHG RX REV CODE 250 W/ 637 OVERRIDE(OP): Performed by: OBSTETRICS & GYNECOLOGY

## 2018-06-24 PROCEDURE — A9270 NON-COVERED ITEM OR SERVICE: HCPCS | Performed by: NURSE PRACTITIONER

## 2018-06-24 PROCEDURE — 770002 HCHG ROOM/CARE - OB PRIVATE (112)

## 2018-06-24 PROCEDURE — 700111 HCHG RX REV CODE 636 W/ 250 OVERRIDE (IP): Performed by: STUDENT IN AN ORGANIZED HEALTH CARE EDUCATION/TRAINING PROGRAM

## 2018-06-24 PROCEDURE — 700111 HCHG RX REV CODE 636 W/ 250 OVERRIDE (IP): Performed by: OBSTETRICS & GYNECOLOGY

## 2018-06-24 PROCEDURE — 3E023GC INTRODUCTION OF OTHER THERAPEUTIC SUBSTANCE INTO MUSCLE, PERCUTANEOUS APPROACH: ICD-10-PCS | Performed by: OBSTETRICS & GYNECOLOGY

## 2018-06-24 PROCEDURE — A9270 NON-COVERED ITEM OR SERVICE: HCPCS | Performed by: OBSTETRICS & GYNECOLOGY

## 2018-06-24 RX ORDER — MEDROXYPROGESTERONE ACETATE 150 MG/ML
150 INJECTION, SUSPENSION INTRAMUSCULAR ONCE
Status: COMPLETED | OUTPATIENT
Start: 2018-06-24 | End: 2018-06-24

## 2018-06-24 RX ADMIN — IBUPROFEN 600 MG: 600 TABLET, FILM COATED ORAL at 07:55

## 2018-06-24 RX ADMIN — IBUPROFEN 600 MG: 600 TABLET, FILM COATED ORAL at 00:40

## 2018-06-24 RX ADMIN — MEDROXYPROGESTERONE ACETATE 150 MG: 150 INJECTION, SUSPENSION INTRAMUSCULAR at 10:18

## 2018-06-24 RX ADMIN — IBUPROFEN 600 MG: 600 TABLET, FILM COATED ORAL at 14:37

## 2018-06-24 RX ADMIN — OXYCODONE HYDROCHLORIDE AND ACETAMINOPHEN 1 TABLET: 10; 325 TABLET ORAL at 00:40

## 2018-06-24 RX ADMIN — Medication 325 MG: at 07:55

## 2018-06-24 RX ADMIN — IBUPROFEN 600 MG: 600 TABLET, FILM COATED ORAL at 21:04

## 2018-06-24 RX ADMIN — OXYCODONE HYDROCHLORIDE AND ACETAMINOPHEN 1 TABLET: 10; 325 TABLET ORAL at 22:43

## 2018-06-24 RX ADMIN — OXYCODONE HYDROCHLORIDE AND ACETAMINOPHEN 1 TABLET: 10; 325 TABLET ORAL at 10:18

## 2018-06-24 RX ADMIN — OXYCODONE HYDROCHLORIDE AND ACETAMINOPHEN 1 TABLET: 10; 325 TABLET ORAL at 06:00

## 2018-06-24 RX ADMIN — Medication 1 TABLET: at 07:55

## 2018-06-24 RX ADMIN — OXYCODONE HYDROCHLORIDE AND ACETAMINOPHEN 1 TABLET: 10; 325 TABLET ORAL at 18:33

## 2018-06-24 RX ADMIN — Medication 325 MG: at 18:33

## 2018-06-24 RX ADMIN — OXYCODONE HYDROCHLORIDE AND ACETAMINOPHEN 1 TABLET: 10; 325 TABLET ORAL at 14:37

## 2018-06-24 RX ADMIN — ONDANSETRON 4 MG: 4 TABLET, ORALLY DISINTEGRATING ORAL at 07:55

## 2018-06-24 ASSESSMENT — PAIN SCALES - GENERAL
PAINLEVEL_OUTOF10: 5
PAINLEVEL_OUTOF10: 5
PAINLEVEL_OUTOF10: 7
PAINLEVEL_OUTOF10: 7
PAINLEVEL_OUTOF10: 5
PAINLEVEL_OUTOF10: 0
PAINLEVEL_OUTOF10: 7
PAINLEVEL_OUTOF10: 6
PAINLEVEL_OUTOF10: 7
PAINLEVEL_OUTOF10: 8
PAINLEVEL_OUTOF10: 3

## 2018-06-24 NOTE — DISCHARGE PLANNING
Medical Social Work    Referral: Discharge planning      Intervention: MOB and baby will stay admitted today. MOB request counseling resources. Counseling resources provided. This writer updated Deborah Agustin with  [facility] for the plan of adoption.  The Adoption SW is Deborha Agustin (cell: [phone]) and was updated of discharge plan. Deborah request that the MOB is discharge tomorrow and paperwork of the relinquish paperwork.    Plan: MOB and Baby are projected to discharge tomorrow if medically cleared.  Once relinquishment has been signed, infant to be discharged to adoptive parents: Rubio Palma

## 2018-06-24 NOTE — CARE PLAN
Problem: Alteration in comfort related to surgical incision and/or after birth pains  Goal: Patient is able to ambulate, care for self and infant with acceptable pain level  Outcome: PROGRESSING AS EXPECTED  Patient demonstrates ability to care for self and infant.     Problem: Potential knowledge deficit related to lack of understanding of self and  care  Goal: Patient will verbalize understanding of self and infant care  Outcome: PROGRESSING AS EXPECTED  Patient verbalizes understanding of self and infant care. Discharge education reviewed.

## 2018-06-24 NOTE — PROGRESS NOTES
Assessment complete. Fundus firm, lochia light. VSS. Tolerating diet. Voiding without difficulty. Incision dressing CDI. Pain controlled with prn medications per mar. Will offer pain medications as they become available. Breasts are firm and engorged; icepacks given, suggested wearing cabbage leaves and tight fitting sports bra. Adoptive parents at bedside, bonding with pt/baby. POC discussed with pt. Encouraged to call with needs. Call light in place.

## 2018-06-24 NOTE — PROGRESS NOTES
Report received at 0700. Assessment completed: VSS. Abdominal incision approximated - Tegaderm cdi. Patient ambulates by self. Fundus firm, lochia light rubra. Patient states pain 6/10; medicated per MAR. Patient would like medication offered when available. Bed in lowest locked position. Call light in place. All questions and concerns addressed. Will continue to monitor.

## 2018-06-24 NOTE — PROGRESS NOTES
Name:   Ines Friedman   Date/Time:  2018 7:06 AM  Gestational Age:  Unknown  Admit Date:   2018  Admitting Dx:   Pregnancy  Labor and delivery, indication for care    POD# 3 S/P RLTCS    S:  Abdominal pain yes   Ambulating   yes  Tolerating PO  Yes - has some nausea  Flatus    yes  Bleeding   Yes - minimal  Voiding   yes   Dizziness   no  Breast feeding  yes  Breast tenderness  yes    O:  Pulse: 65  Blood Pressure: 114/78     Temp  Av.4 °C (97.5 °F)  Min: 36.3 °C (97.4 °F)  Max: 36.4 °C (97.6 °F)  Heart: regular rate and rhythm without gallops or murmurs  Lungs: clear bases  Abdomen: abdomen soft, BS+, nondistended, fundus appropriately tender, incision clean and dry  Extremities: non-tender  Catheter: DC'd  No intake or output data in the 24 hours ending 18 0706    A:  POD# 3 S/P RLTCS  Stable/progressing well    P:  Routine C/S Postpartum care, continue pain management, encourage ambulation,  Patient is planning on adopting out and would like to stay one day longer to allow adoptive parents to get here.  SW advising that mom and baby stay here until adoptive parents available - tomorrow  Breasts tender - advised to put on tight bra, use ice packs and not express milk unless absolutely necessary  Desires depo shot for now - will order   anticipate DC POD#4     Malcom Nash D.O.

## 2018-06-24 NOTE — DISCHARGE PLANNING
Medical Social Work    MSW received call from Deborah ([phone]) requesting update on pt. Deborah met with pt this morning. Deborah requesting pt and infant be discharged on Monday morning. MSW will relay information to bedside RN.     MSW called bedside RN and updated her on request. Will update physician.     Bedside RN also placed consult for resources for pt after adoption. MSW spoke with Deborah again regarding any support that can be provided after adoption is complete. Deborah stated she spoke to pt in length regarding assistance this morning. Deborah stated the adoption agency can assist with anything that she will need in that area once discharged.    Deborah is on call this weekend and can be notified if anything changes. MSW to leave report for Unit SW tomorrow.

## 2018-06-24 NOTE — CARE PLAN
Problem: Altered physiologic condition related to postoperative  delivery  Goal: Patient physiologically stable as evidenced by normal lochia, palpable uterine involution and vital signs within normal limits  Outcome: PROGRESSING AS EXPECTED  VSS. Fundus firm. Lochia light.    Problem: Alteration in comfort related to surgical incision and/or after birth pains  Goal: Patient verbalizes acceptable pain level  Outcome: PROGRESSING AS EXPECTED  Pt pain managed with PRN pain meds per MAR.

## 2018-06-25 VITALS
OXYGEN SATURATION: 97 % | SYSTOLIC BLOOD PRESSURE: 126 MMHG | HEART RATE: 100 BPM | WEIGHT: 135 LBS | DIASTOLIC BLOOD PRESSURE: 77 MMHG | HEIGHT: 64 IN | RESPIRATION RATE: 18 BRPM | BODY MASS INDEX: 23.05 KG/M2 | TEMPERATURE: 98.7 F

## 2018-06-25 PROCEDURE — A9270 NON-COVERED ITEM OR SERVICE: HCPCS | Performed by: NURSE PRACTITIONER

## 2018-06-25 PROCEDURE — 700102 HCHG RX REV CODE 250 W/ 637 OVERRIDE(OP): Performed by: OBSTETRICS & GYNECOLOGY

## 2018-06-25 PROCEDURE — A9270 NON-COVERED ITEM OR SERVICE: HCPCS | Performed by: OBSTETRICS & GYNECOLOGY

## 2018-06-25 PROCEDURE — 700102 HCHG RX REV CODE 250 W/ 637 OVERRIDE(OP): Performed by: NURSE PRACTITIONER

## 2018-06-25 PROCEDURE — 700112 HCHG RX REV CODE 229: Performed by: OBSTETRICS & GYNECOLOGY

## 2018-06-25 RX ORDER — IBUPROFEN 600 MG/1
600 TABLET ORAL EVERY 6 HOURS PRN
Qty: 30 TAB | Refills: 0 | Status: SHIPPED | OUTPATIENT
Start: 2018-06-25 | End: 2021-11-21

## 2018-06-25 RX ORDER — FERROUS SULFATE 325(65) MG
325 TABLET ORAL 2 TIMES DAILY WITH MEALS
Qty: 30 TAB | Refills: 1 | Status: SHIPPED | OUTPATIENT
Start: 2018-06-25 | End: 2021-11-21

## 2018-06-25 RX ORDER — PSEUDOEPHEDRINE HCL 30 MG
100 TABLET ORAL 2 TIMES DAILY PRN
Qty: 60 CAP | Refills: 0 | Status: SHIPPED | OUTPATIENT
Start: 2018-06-25 | End: 2021-11-21

## 2018-06-25 RX ORDER — HYDROCODONE BITARTRATE AND ACETAMINOPHEN 5; 325 MG/1; MG/1
1-2 TABLET ORAL EVERY 4 HOURS PRN
Qty: 20 TAB | Refills: 0 | Status: SHIPPED | OUTPATIENT
Start: 2018-06-25 | End: 2018-06-29

## 2018-06-25 RX ADMIN — Medication 1 TABLET: at 09:47

## 2018-06-25 RX ADMIN — IBUPROFEN 600 MG: 600 TABLET, FILM COATED ORAL at 09:47

## 2018-06-25 RX ADMIN — OXYCODONE HYDROCHLORIDE AND ACETAMINOPHEN 1 TABLET: 10; 325 TABLET ORAL at 03:29

## 2018-06-25 RX ADMIN — IBUPROFEN 600 MG: 600 TABLET, FILM COATED ORAL at 03:29

## 2018-06-25 RX ADMIN — Medication 325 MG: at 09:47

## 2018-06-25 RX ADMIN — DOCUSATE SODIUM 100 MG: 100 CAPSULE ORAL at 09:47

## 2018-06-25 RX ADMIN — OXYCODONE HYDROCHLORIDE AND ACETAMINOPHEN 1 TABLET: 10; 325 TABLET ORAL at 09:47

## 2018-06-25 ASSESSMENT — PAIN SCALES - GENERAL
PAINLEVEL_OUTOF10: 8
PAINLEVEL_OUTOF10: 5
PAINLEVEL_OUTOF10: 3
PAINLEVEL_OUTOF10: 4
PAINLEVEL_OUTOF10: 7

## 2018-06-25 NOTE — CARE PLAN
Problem: Potential for postpartum infection related to surgical incision, compromised uterine condition, urinary tract or respiratory compromise  Goal: Patient will be afebrile and free from signs and symptoms of infection  Outcome: PROGRESSING AS EXPECTED  Patient afebrile. Abdominal incision approximated. No redness or swelling.     Problem: Potential knowledge deficit related to lack of understanding of self and  care  Goal: Patient will demonstrate ability to care for self and infant  Outcome: PROGRESSING AS EXPECTED  Patient demonstrates self and infant care. Requesting formula for the infant. Participating in the infant cares. RN encouraged patient to ambulate the halls more frequently. Verbalizes understanding.

## 2018-06-25 NOTE — DISCHARGE PLANNING
:     Spoke with RN, notified MOB and infant will be ready for discharge today.  Message left and e-mailed Deborah Agustin with [employer] [facility] to discuss the discharge plan.     Waiting to hear back from Adoption SW.

## 2018-06-25 NOTE — PROGRESS NOTES
Report received at 0700. Assessment completed: Abdominal incision approximated - Tegaderm cdi. Patient ambulates by self. Fundus firm, lochia scant. Patient states pain 7/10; medicated per MAR. Patient would like medication offered when available. Bed in lowest locked position. Call light in place. All questions and concerns addressed. Will continue to monitor.

## 2018-06-25 NOTE — DISCHARGE INSTRUCTIONS
POSTPARTUM DISCHARGE INSTRUCTIONS FOR MOM    YOB: 1995   Age: 22 y.o.               Admit Date: 2018     Discharge Date: 2018  Attending Doctor:  Jenni Worrell M.D.                  Allergies:  Toradol    Discharged to home by car. Discharged via wheelchair, hospital escort: Yes.  Special equipment needed: Not Applicable  Belongings with: Personal  Be sure to schedule a follow-up appointment with your primary care doctor or any specialists as instructed.     Discharge Plan:   Diet Plan: Discussed  Activity Level: Discussed  Confirmed Follow up Appointment: Patient to Call and Schedule Appointment  Confirmed Symptoms Management: Discussed  Medication Reconciliation Updated: Yes    REASONS TO CALL YOUR OBSTETRICIAN:  1.   Persistent fever or shaking chills (Temperature higher than 100.4)  2.   Heavy bleeding (soaking more than 1 pad per hour); Passing clots  3.   Foul odor from vagina  4.   Mastitis (Breast infection; breast pain, chills, fever, redness)  5.   Urinary pain, burning or frequency    7.   Abdominal incision infection  8.   Severe depression longer than 24 hours    HAND WASHING  · Prior to handling the baby.  · Before breastfeeding or bottle feeding baby.  · After using the bathroom or changing the baby's diaper.    WOUND CARE  Ask your physician for additional care instructions.  In general:    ·  Incision:      · Keep clean and dry.    · Do NOT lift anything heavier than your baby for up to 6 weeks.    · There should not be any opening or pus.      VAGINAL CARE  · Nothing inside vagina for 6 weeks: no sexual intercourse, tampons or douching.  · Bleeding may continue for 2-4 weeks.  Amount may vary.    · Call your physician for heavy bleeding which means soaking more than 1 pad per hour    BIRTH CONTROL  · It is possible to become pregnant at any time after delivery and while breastfeeding.  · Plan to discuss a method of birth control with your physician at your follow  "up visit. visit.    DIET AND ELIMINATION  · Eating more fiber (bran cereal, fruits, and vegetables) and drinking plenty of fluids will help to avoid constipation.  · Urinary frequency after childbirth is normal.    POSTPARTUM BLUES  During the first few days after birth, you may experience a sense of the \"blues\" which may include impatience, irritability or even crying.  These feeling come and go quickly.  However, as many as 1 in 10 women experience emotional symptoms known as postpartum depression.    Postpartum depression:  May start as early as the second or third day after delivery or take several weeks or months to develop.  Symptoms of \"blues\" are present, but are more intense:  Crying spells; loss of appetite; feelings of hopelessness or loss of control; fear of touching the baby; over concern or no concern at all about the baby; little or no concern about your own appearance/caring for yourself; and/or inability to sleep or excessive sleeping.  Contact your physician if you are experiencing any of these symptoms.    Crisis Hotline:  · [place] Crisis Hotline:  4-221-SKTUXAR  Or 1-546.625.6962  · [place] Crisis Hotline:  1-852.309.5359  Or 804-686-2720    PREVENTING SHAKEN BABY:  If you are angry or stressed, PUT THE BABY IN THE CRIB, step away, take some deep breaths, and wait until you are calm to care for the baby.  DO NOT SHAKE THE BABY.  You are not alone, call a supporter for help.    · Crisis Call Center 24/7 crisis line 241-065-0418 or 1-825.756.5091  · You can also text them, text \"ANSWER\" to 884625    QUIT SMOKING/TOBACCO USE:  I understand the use of any tobacco products increases my chance of suffering from future heart disease and could cause other illnesses which may shorten my life. Quitting the use of tobacco products is the single most important thing I can do to improve my health. For further information on smoking / tobacco cessation call a Toll Free Quit Line at 1-813.387.6649 (*National " Cancer [place]) or 1-938.692.8972 (American Lung Association) or you can access the web based program at www.lungusa.org.    · [place] Tobacco Users Help Line:  (209) 161-4930       Toll Free: 1-454.775.3452  · Quit Tobacco Program [facility] (293)150-1180    DEPRESSION / SUICIDE RISK:  As you are discharged from this [facility], it is important to learn how to keep safe from harming yourself.    Recognize the warning signs:  · Abrupt changes in personality, positive or negative- including increase in energy   · Giving away possessions  · Change in eating patterns- significant weight changes-  positive or negative  · Change in sleeping patterns- unable to sleep or sleeping all the time   · Unwillingness or inability to communicate  · Depression  · Unusual sadness, discouragement and loneliness  · Talk of wanting to die  · Neglect of personal appearance   · Rebelliousness- reckless behavior  · Withdrawal from people/activities they love  · Confusion- inability to concentrate     If you or a loved one observes any of these behaviors or has concerns about self-harm, here's what you can do:  · Talk about it- your feelings and reasons for harming yourself  · Remove any means that you might use to hurt yourself (examples: pills, rope, extension cords, firearm)  · Get professional help from the community (Mental Health, Substance Abuse, psychological counseling)  · Do not be alone:Call your Safe Contact- someone whom you trust who will be there for you.  · Call your local CRISIS HOTLINE 062-6335 or 065-375-4888  · Call your local Children's Mobile Crisis Response Team [place] (772) 438-1744 or www.bepretty  · Call the toll free National Suicide Prevention Hotlines   · National Suicide Prevention Lifeline 986-785-FZTA (7743)  · National Hope Line Network 800-SUICIDE (008-3507)    DISCHARGE SURVEY:  Thank you for choosing [facility].  We hope we provided you with very good  care.  You may be receiving a survey in the mail.  Please fill it out.  Your opinion is valuable to us.    ADDITIONAL EDUCATIONAL MATERIALS GIVEN TO PATIENT:        My signature on this form indicates that:  1.  I have reviewed and understand the above information  2.  My questions regarding this information have been answered to my satisfaction.  3.  I have formulated a plan with my discharge nurse to obtain my prescribed medication for home.

## 2018-06-25 NOTE — PROGRESS NOTES
Assessment complete. VSS- Fundus firm, lochia scant. Patient ambulating and voiding without difficulty. POC discussed at bedside. Feeding plan discussed; infant bottle feeding. Patient would like pain medications scheduled throughout the night.

## 2018-06-25 NOTE — DISCHARGE PLANNING
:    Spoke with Deborah Valdez with [employer] who plans on picking MOB up around 12:30 pm and will have her sign relinquishment.  Once paperwork has been signed, Deborah will contact hospital SW and notify and then infant can be discharged to the adoptive parents: Vazquez and Neetu Palma.  Updated RN of the plan.

## 2018-06-25 NOTE — DISCHARGE SUMMARY
[facility]  SECTION DISHCHARGE SUMMARY    PATIENT ID:  NAME:  Ines Friedman  MRN:               [id number]  YOB: 1995  DATE OF ADMISSION: 2018   DATE OF DISCHARGE:2018     DISCHARGE DIAGNOSES:  1. Intrauterine gestation at term.  2. Delivered viable female infant.    PROCEDURES PERFORMED:  1. Repeat low transverse  section.    COMPLICATIONS: None.    HOSPITAL COURSE: This is a 22 y.o. year-old female  4, now para 3013, who was admitted at 39.1 for Repeat  section and sterilization. Pregnancy was complicated by lack of prenatal care. Her  course was uncomplicated.  [facility] Informed consent was obtained. Under spinal anesthesia an uncomplicated Repeat low transverse  section and bilateral tubal ligation were performed. A viable female infant with Apgars of 8 and 9 was delivered. The patient's postoperative course was uneventful. She remained afebrile with stable vital signs. Pt has been ambulating, tolerating a regular diet and has had return of normal GI function. Pain has been well controlled with morphine and percoet. The wound is healing nicely. Prior to discharge staples were removed and steri strips were placed.    LABS:   No results for input(s): WBC, RBC, HEMOGLOBIN, HEMATOCRIT, MCV, MCH, RDW, PLATELETCT, MPV, NEUTSPOLYS, LYMPHOCYTES, MONOCYTES, EOSINOPHILS, BASOPHILS, RBCMORPHOLO in the last 72 hours.     Ines Friedman JAVIER   Home Medication Instructions MEGHAN:[id number]    Printed on:18 0628   Medication Information                      docusate sodium 100 MG Cap  Take 100 mg by mouth 2 times a day as needed for Constipation.             ferrous sulfate 325 (65 Fe) MG tablet  Take 1 Tab by mouth 2 times a day, with meals.             HYDROcodone-acetaminophen (NORCO) 5-325 MG Tab per tablet  Take 1-2 Tabs by mouth every four hours as needed for up to 4 days.             ibuprofen (MOTRIN) 600 MG Tab  Take 1 Tab by  mouth every 6 hours as needed (For cramping after delivery; do not give if patient is receiving ketorolac (Toradol)).             oxcarbazepine (TRILEPTAL) 300 MG Tab  Take 1 Tab by mouth 2 times a day.                 CONDITION: Stable.    DISPOSITION: Home.    ACTIVITY: Slow increase as tolerated. No lifting heavier than baby. Nothing in the vagina (ie Tampons, douching, intercourse,...) for until follow-up in the office in five weeks.  No soaking in bathtubs or hot tubs until follow-up appointment in 5 weeks.  Continue to take your prenatal vitamins while breastfeeding.  Return to ER or see your primary care physician if your symptoms worsen or for any new problems, questions or concerns.    DIET:  Regular    FOLLOW UP:   1) [facility] and also in 4-6 weeks.

## 2018-06-25 NOTE — PROGRESS NOTES
Discharge instructions reviewed and signed; copy given to patient and placed in chart. Opiate consent signed and copy given to patient. F/U appointment discussed. Patient verbalizes understanding.

## 2018-07-09 NOTE — OP REPORT
DATE OF SERVICE:  06/25/2018    ADDENDUM    ASSISTANT SURGEON:  DEVEN Viveros, certified nurse midwife.       ____________________________________     MD STEPHANIE DELONG / TYE    DD:  07/09/2018 09:02:39  DT:  07/09/2018 09:11:38    D#:  [id number]  Job#:  [id number]

## 2018-07-13 ENCOUNTER — TELEPHONE (OUTPATIENT)
Dept: OBGYN | Facility: CLINIC | Age: 23
End: 2018-07-13

## 2018-07-13 NOTE — TELEPHONE ENCOUNTER
Pt called and requested a release to go back to work. Pt is only 3 weeks PP and was delivered by [facility] as a [facility] admit to L&D in active labor. Pt has appt on 8/13/18 for her PP.  I consulted with Vale Walters CNM and she is not comfortable releasing her to work with out physically seeing her to make sure every thing is ok.  I let pt know what our provider said and pt had no further questions. Advised pt to keep her appt and she can get her release letter at that time.

## 2018-08-21 ENCOUNTER — GYNECOLOGY VISIT (OUTPATIENT)
Dept: OBGYN | Facility: CLINIC | Age: 23
End: 2018-08-21
Payer: MEDICAID

## 2018-08-21 VITALS
HEIGHT: 64 IN | HEART RATE: 76 BPM | WEIGHT: 118 LBS | SYSTOLIC BLOOD PRESSURE: 112 MMHG | BODY MASS INDEX: 20.14 KG/M2 | DIASTOLIC BLOOD PRESSURE: 62 MMHG

## 2018-08-21 DIAGNOSIS — Z30.09 CONTRACEPTIVE EDUCATION: ICD-10-CM

## 2018-08-21 PROCEDURE — 99213 OFFICE O/P EST LOW 20 MIN: CPT | Performed by: OBSTETRICS & GYNECOLOGY

## 2018-08-21 ASSESSMENT — ENCOUNTER SYMPTOMS
CARDIOVASCULAR NEGATIVE: 1
GASTROINTESTINAL NEGATIVE: 1
RESPIRATORY NEGATIVE: 1
PSYCHIATRIC NEGATIVE: 1
EYES NEGATIVE: 1
CONSTITUTIONAL NEGATIVE: 1
NEUROLOGICAL NEGATIVE: 1
MUSCULOSKELETAL NEGATIVE: 1

## 2018-08-21 NOTE — PROGRESS NOTES
Pt here for C/S check delivered on 7/21/18.  Currently bottle feeding.   # [phone]  Pt states no complaints.

## 2018-08-21 NOTE — LETTER
August 21, 2018       Patient: Ines Friedman   YOB: 1995   Date of Visit: 8/21/2018         To Whom It May Concern:    It is my medical opinion that Ines Friedman return to full duty, no restrictions 8/21/18.    If you have any questions or concerns, please don't hesitate to call [phone]          Sincerely,                Vinayak Hernández M.D.

## 2018-08-21 NOTE — PROGRESS NOTES
Subjective:      Ines Friedman is a 22 y.o. female who presents to clinic for gynecologic evaluation/to discuss Contraception            HPI patient is a 22-year-old  5 para 4014 who presents today to discuss contraception options.  Patient had a repeat  in  of this year as a walk-in patient without any prenatal care.  Patient has not followed up since delivery.  Patient states she is doing well and has no complaints or concerns.  Reports normal bowel and bladder function.  Denies any pain.  She denies any vaginal bleeding.  Reports normal menstrual.. Denies any depression symptoms.  Patient is bottlefeeding.  Patient states she had used Depo-Provera previously which she desires a long-acting reversible contraception.    Review of Systems   Constitutional: Negative.    HENT: Negative.    Eyes: Negative.    Respiratory: Negative.    Cardiovascular: Negative.    Gastrointestinal: Negative.    Musculoskeletal: Negative.    Skin: Negative.    Neurological: Negative.    Endo/Heme/Allergies: Negative.    Psychiatric/Behavioral: Negative.    All other systems reviewed and are negative.    Past Medical History:   Diagnosis Date   • Seizure disorder (HCC)     once       Past Surgical History:   Procedure Laterality Date   • DILATION AND EVACUATION N/A 2017    Procedure: DILATION AND EVACUATION;  Surgeon: Pawan Brooks M.D.;  Location: SURGERY SAME DAY [facility];  Service:    • PRIMARY C SECTION  2015    Procedure: PRIMARY C SECTION;  Surgeon: Jenni Worrell M.D.;  Location: LABOR AND DELIVERY;  Service:      Obstetric History       T3      L3     SAB1   TAB0   Ectopic0   Molar0   Multiple0   Live Births3        Current Outpatient Prescriptions:   •  ibuprofen (MOTRIN) 600 MG Tab, Take 1 Tab by mouth 3 times a day, with meals. As needed for pain. (Patient not taking: Reported on 2018), Disp: 20 Tab, Rfl: 0  •  ondansetron (ZOFRAN ODT) 4 MG TABLET DISPERSIBLE,  "Take 1 Tab by mouth every 8 hours as needed. (Patient not taking: Reported on 8/21/2018), Disp: 12 Tab, Rfl: 0     No Known Allergies  Social History     Social History   • Marital status: Single     Spouse name: N/A   • Number of children: N/A   • Years of education: N/A     Occupational History   • Not on file.     Social History Main Topics   • Smoking status: Current Every Day Smoker     Packs/day: 0.50     Types: Cigarettes   • Smokeless tobacco: Never Used      Comment: Pt states smoking about 1 pack every 4 days   • Alcohol use No   • Drug use: No   • Sexual activity: Yes     Partners: Male      Comment: NONE      Other Topics Concern   • Not on file     Social History Narrative   • No narrative on file       Family History   Problem Relation Age of Onset   • No Known Problems Mother    • No Known Problems Father           Objective:     /62   Pulse 76   Ht 1.626 m (5' 4\")   Wt 53.5 kg (118 lb)   BMI 20.25 kg/m²      Physical Exam   Constitutional: She is oriented to person, place, and time. She appears well-developed and well-nourished. No distress.   HENT:   Head: Normocephalic and atraumatic.   Cardiovascular: Normal rate, normal heart sounds and intact distal pulses.    Abdominal: Soft. Bowel sounds are normal. She exhibits no distension and no mass. There is no tenderness. There is no guarding.   Silver dressing from surgery is still present and was removed.  Incision is healed, intact and nontender   Genitourinary: Vagina normal and uterus normal.   Musculoskeletal: Normal range of motion. She exhibits no edema or deformity.   Neurological: She is alert and oriented to person, place, and time.   Skin: She is not diaphoretic.   Psychiatric: She has a normal mood and affect. Her behavior is normal. Thought content normal.   Nursing note and vitals reviewed.       Discussion: We discussed long-term  Reversible forms of contraception available including IUDs contraception implant/Nexplanon.  " Reviewed risks, benefits and potential complications were each of these options,.  I also discussed options including Depo-Provera, OCP, patch, vaginal ring, condoms with spermicide options.  After extensive discussion, patient states she will like to have Mirena IUD and she was counseled on this device including risks, benefits potential complications including expulsion of migration,, failure rates, side effects, potential bleeding changes, need for barrier contraception for STD prevention.  Brochures were provided.  Patient agrees for Mirena IUD.  Insurance authorization sent.  Patient states she will use condoms with spermicide until IUD placement.       Assessment/Plan:     1. Contraceptive education  22-year-old multiparous female who presents today for discussion of birth control options.  Patient had a repeat  couple months ago and did not follow-up post operatively.  Her incision is healed and intact.  We discussed LARC options available.  Patient desires Mirena IUD.  - REFERRAL TO OB/GYN    Precautions and plan of care reviewed  Safe sex practices reviewed      Patient given note for return to work without restrictions    Plan of care discussed.

## 2018-09-20 ENCOUNTER — APPOINTMENT (OUTPATIENT)
Dept: RADIOLOGY | Facility: MEDICAL CENTER | Age: 23
End: 2018-09-20
Attending: EMERGENCY MEDICINE

## 2018-09-20 ENCOUNTER — HOSPITAL ENCOUNTER (EMERGENCY)
Facility: MEDICAL CENTER | Age: 23
End: 2018-09-20
Attending: EMERGENCY MEDICINE

## 2018-09-20 VITALS
HEART RATE: 78 BPM | SYSTOLIC BLOOD PRESSURE: 123 MMHG | DIASTOLIC BLOOD PRESSURE: 77 MMHG | WEIGHT: 113 LBS | OXYGEN SATURATION: 100 % | RESPIRATION RATE: 18 BRPM | BODY MASS INDEX: 19.29 KG/M2 | TEMPERATURE: 98.4 F | HEIGHT: 64 IN

## 2018-09-20 DIAGNOSIS — D72.819 LEUKOPENIA, UNSPECIFIED TYPE: ICD-10-CM

## 2018-09-20 DIAGNOSIS — N39.0 ACUTE UTI: ICD-10-CM

## 2018-09-20 DIAGNOSIS — E87.6 HYPOKALEMIA: ICD-10-CM
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
## 2018-09-20 DIAGNOSIS — R79.1 ELEVATED INR: ICD-10-CM

## 2018-09-20 LAB
ALBUMIN SERPL BCP-MCNC: 5.1 G/DL (ref 3.2–4.9)
ALBUMIN/GLOB SERPL: 1.8 G/DL
ALP SERPL-CCNC: 47 U/L (ref 30–99)
ALT SERPL-CCNC: 33 U/L (ref 2–50)
ANION GAP SERPL CALC-SCNC: 13 MMOL/L (ref 0–11.9)
APPEARANCE UR: CLEAR
APTT PPP: 24.4 SEC (ref 24.7–36)
AST SERPL-CCNC: 24 U/L (ref 12–45)
BACTERIA #/AREA URNS HPF: ABNORMAL /HPF
BASOPHILS # BLD AUTO: 0.2 % (ref 0–1.8)
BASOPHILS # BLD: 0.01 K/UL (ref 0–0.12)
BILIRUB SERPL-MCNC: 0.5 MG/DL (ref 0.1–1.5)
BILIRUB UR QL STRIP.AUTO: NEGATIVE
BUN SERPL-MCNC: 11 MG/DL (ref 8–22)
CALCIUM SERPL-MCNC: 9.9 MG/DL (ref 8.5–10.5)
CHLORIDE SERPL-SCNC: 106 MMOL/L (ref 96–112)
CO2 SERPL-SCNC: 22 MMOL/L (ref 20–33)
COLOR UR: ABNORMAL
COMMENT 1642: NORMAL
CREAT SERPL-MCNC: 0.99 MG/DL (ref 0.5–1.4)
EOSINOPHIL # BLD AUTO: 0.02 K/UL (ref 0–0.51)
EOSINOPHIL NFR BLD: 0.5 % (ref 0–6.9)
EPI CELLS #/AREA URNS HPF: ABNORMAL /HPF
ERYTHROCYTE [DISTWIDTH] IN BLOOD BY AUTOMATED COUNT: 40.5 FL (ref 35.9–50)
GLOBULIN SER CALC-MCNC: 2.8 G/DL (ref 1.9–3.5)
GLUCOSE SERPL-MCNC: 94 MG/DL (ref 65–99)
GLUCOSE UR STRIP.AUTO-MCNC: NEGATIVE MG/DL
HCG SERPL QL: NEGATIVE
HCT VFR BLD AUTO: 38.9 % (ref 37–47)
HGB BLD-MCNC: 12.6 G/DL (ref 12–16)
HYALINE CASTS #/AREA URNS LPF: ABNORMAL /LPF
IMM GRANULOCYTES # BLD AUTO: 0.02 K/UL (ref 0–0.11)
IMM GRANULOCYTES NFR BLD AUTO: 0.5 % (ref 0–0.9)
INR PPP: 1.26 (ref 0.87–1.13)
KETONES UR STRIP.AUTO-MCNC: 40 MG/DL
LEUKOCYTE ESTERASE UR QL STRIP.AUTO: NEGATIVE
LYMPHOCYTES # BLD AUTO: 1.26 K/UL (ref 1–4.8)
LYMPHOCYTES NFR BLD: 29.5 % (ref 22–41)
MCH RBC QN AUTO: 26.1 PG (ref 27–33)
MCHC RBC AUTO-ENTMCNC: 32.4 G/DL (ref 33.6–35)
MCV RBC AUTO: 80.7 FL (ref 81.4–97.8)
MICRO URNS: ABNORMAL
MONOCYTES # BLD AUTO: 0.29 K/UL (ref 0–0.85)
MONOCYTES NFR BLD AUTO: 6.8 % (ref 0–13.4)
MORPHOLOGY BLD-IMP: NORMAL
NEUTROPHILS # BLD AUTO: 2.67 K/UL (ref 2–7.15)
NEUTROPHILS NFR BLD: 62.5 % (ref 44–72)
NITRITE UR QL STRIP.AUTO: NEGATIVE
NRBC # BLD AUTO: 0 K/UL
NRBC BLD-RTO: 0 /100 WBC
PH UR STRIP.AUTO: 6 [PH]
PLATELET # BLD AUTO: 202 K/UL (ref 164–446)
PLATELET BLD QL SMEAR: NORMAL
PMV BLD AUTO: 10.6 FL (ref 9–12.9)
POTASSIUM SERPL-SCNC: 3.1 MMOL/L (ref 3.6–5.5)
PROT SERPL-MCNC: 7.9 G/DL (ref 6–8.2)
PROT UR QL STRIP: 100 MG/DL
PROTHROMBIN TIME: 15.9 SEC (ref 12–14.6)
RBC # BLD AUTO: 4.82 M/UL (ref 4.2–5.4)
RBC # URNS HPF: ABNORMAL /HPF
RBC BLD AUTO: NORMAL
RBC UR QL AUTO: ABNORMAL
SODIUM SERPL-SCNC: 141 MMOL/L (ref 135–145)
SP GR UR STRIP.AUTO: >=1.045
UROBILINOGEN UR STRIP.AUTO-MCNC: 1 MG/DL
WBC # BLD AUTO: 4.3 K/UL (ref 4.8–10.8)
WBC #/AREA URNS HPF: ABNORMAL /HPF

## 2018-09-20 PROCEDURE — 70498 CT ANGIOGRAPHY NECK: CPT

## 2018-09-20 PROCEDURE — 70450 CT HEAD/BRAIN W/O DYE: CPT

## 2018-09-20 PROCEDURE — 90715 TDAP VACCINE 7 YRS/> IM: CPT | Performed by: EMERGENCY MEDICINE

## 2018-09-20 PROCEDURE — 85610 PROTHROMBIN TIME: CPT

## 2018-09-20 PROCEDURE — 72128 CT CHEST SPINE W/O DYE: CPT

## 2018-09-20 PROCEDURE — 80053 COMPREHEN METABOLIC PANEL: CPT

## 2018-09-20 PROCEDURE — 36415 COLL VENOUS BLD VENIPUNCTURE: CPT

## 2018-09-20 PROCEDURE — 700102 HCHG RX REV CODE 250 W/ 637 OVERRIDE(OP): Performed by: EMERGENCY MEDICINE

## 2018-09-20 PROCEDURE — 85730 THROMBOPLASTIN TIME PARTIAL: CPT

## 2018-09-20 PROCEDURE — 84703 CHORIONIC GONADOTROPIN ASSAY: CPT

## 2018-09-20 PROCEDURE — 72125 CT NECK SPINE W/O DYE: CPT

## 2018-09-20 PROCEDURE — 87086 URINE CULTURE/COLONY COUNT: CPT

## 2018-09-20 PROCEDURE — 700111 HCHG RX REV CODE 636 W/ 250 OVERRIDE (IP): Performed by: EMERGENCY MEDICINE

## 2018-09-20 PROCEDURE — 99285 EMERGENCY DEPT VISIT HI MDM: CPT

## 2018-09-20 PROCEDURE — 85025 COMPLETE CBC W/AUTO DIFF WBC: CPT

## 2018-09-20 PROCEDURE — 90471 IMMUNIZATION ADMIN: CPT

## 2018-09-20 PROCEDURE — 81001 URINALYSIS AUTO W/SCOPE: CPT

## 2018-09-20 PROCEDURE — 700105 HCHG RX REV CODE 258: Performed by: EMERGENCY MEDICINE

## 2018-09-20 PROCEDURE — A9270 NON-COVERED ITEM OR SERVICE: HCPCS | Performed by: EMERGENCY MEDICINE

## 2018-09-20 PROCEDURE — 700117 HCHG RX CONTRAST REV CODE 255: Performed by: EMERGENCY MEDICINE

## 2018-09-20 PROCEDURE — 71045 X-RAY EXAM CHEST 1 VIEW: CPT

## 2018-09-20 PROCEDURE — 72131 CT LUMBAR SPINE W/O DYE: CPT

## 2018-09-20 RX ORDER — POTASSIUM CHLORIDE 20 MEQ/1
40 TABLET, EXTENDED RELEASE ORAL ONCE
Status: COMPLETED | OUTPATIENT
Start: 2018-09-20 | End: 2018-09-20

## 2018-09-20 RX ORDER — ACETAMINOPHEN 325 MG/1
650 TABLET ORAL ONCE
Status: COMPLETED | OUTPATIENT
Start: 2018-09-20 | End: 2018-09-20

## 2018-09-20 RX ORDER — SODIUM CHLORIDE 9 MG/ML
1000 INJECTION, SOLUTION INTRAVENOUS ONCE
Status: COMPLETED | OUTPATIENT
Start: 2018-09-20 | End: 2018-09-20

## 2018-09-20 RX ORDER — CEPHALEXIN 500 MG/1
500 CAPSULE ORAL 2 TIMES DAILY
Qty: 14 CAP | Refills: 0 | Status: SHIPPED | OUTPATIENT
Start: 2018-09-20 | End: 2018-09-27

## 2018-09-20 RX ADMIN — POTASSIUM CHLORIDE 40 MEQ: 1500 TABLET, EXTENDED RELEASE ORAL at 21:12

## 2018-09-20 RX ADMIN — ACETAMINOPHEN 650 MG: 325 TABLET, FILM COATED ORAL at 16:39

## 2018-09-20 RX ADMIN — IOHEXOL 100 ML: 350 INJECTION, SOLUTION INTRAVENOUS at 18:44

## 2018-09-20 RX ADMIN — CLOSTRIDIUM TETANI TOXOID ANTIGEN (FORMALDEHYDE INACTIVATED), CORYNEBACTERIUM DIPHTHERIAE TOXOID ANTIGEN (FORMALDEHYDE INACTIVATED), BORDETELLA PERTUSSIS TOXOID ANTIGEN (GLUTARALDEHYDE INACTIVATED), BORDETELLA PERTUSSIS FILAMENTOUS HEMAGGLUTININ ANTIGEN (FORMALDEHYDE INACTIVATED), BORDETELLA PERTUSSIS PERTACTIN ANTIGEN, AND BORDETELLA PERTUSSIS FIMBRIAE 2/3 ANTIGEN 0.5 ML: 5; 2; 2.5; 5; 3; 5 INJECTION, SUSPENSION INTRAMUSCULAR at 21:13

## 2018-09-20 RX ADMIN — SODIUM CHLORIDE 1000 ML: 9 INJECTION, SOLUTION INTRAVENOUS at 16:40

## 2018-09-20 ASSESSMENT — LIFESTYLE VARIABLES: DO YOU DRINK ALCOHOL: NO

## 2018-09-20 ASSESSMENT — PAIN SCALES - GENERAL: PAINLEVEL_OUTOF10: 7
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
## 2018-09-21 NOTE — ED NOTES
Xray and CT scans have resulted, pt ready for re-eval. Pt will be ambulated to the bathroom for UA after ERP assess scans.
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
## 2018-09-22 LAB
BACTERIA UR CULT: NORMAL
SIGNIFICANT IND 70042: NORMAL
SITE SITE: NORMAL
SOURCE SOURCE: NORMAL

## 2018-11-05 NOTE — ED NOTES
Verbalizes understanding of POC. Blood drawn and sent to lab. Aware of need for urine sample, unable to provide at this time. Given water as requested. VSS, call light within reach.    no

## 2019-02-12 ENCOUNTER — APPOINTMENT (OUTPATIENT)
Dept: RADIOLOGY | Facility: MEDICAL CENTER | Age: 24
End: 2019-02-12
Attending: EMERGENCY MEDICINE
Payer: COMMERCIAL

## 2019-02-12 ENCOUNTER — HOSPITAL ENCOUNTER (EMERGENCY)
Facility: MEDICAL CENTER | Age: 24
End: 2019-02-12
Attending: EMERGENCY MEDICINE
Payer: COMMERCIAL

## 2019-02-12 VITALS
HEIGHT: 64 IN | RESPIRATION RATE: 16 BRPM | HEART RATE: 89 BPM | TEMPERATURE: 97.4 F | OXYGEN SATURATION: 98 % | SYSTOLIC BLOOD PRESSURE: 124 MMHG | DIASTOLIC BLOOD PRESSURE: 82 MMHG | WEIGHT: 113 LBS | BODY MASS INDEX: 19.29 KG/M2

## 2019-02-12 DIAGNOSIS — S70.01XA CONTUSION OF RIGHT HIP, INITIAL ENCOUNTER: ICD-10-CM

## 2019-02-12 LAB
ABO GROUP BLD: NORMAL
ALBUMIN SERPL BCP-MCNC: 4.4 G/DL (ref 3.2–4.9)
ALBUMIN/GLOB SERPL: 2 G/DL
ALP SERPL-CCNC: 42 U/L (ref 30–99)
ALT SERPL-CCNC: 13 U/L (ref 2–50)
ANION GAP SERPL CALC-SCNC: 7 MMOL/L (ref 0–11.9)
AST SERPL-CCNC: 15 U/L (ref 12–45)
BASOPHILS # BLD AUTO: 0.3 % (ref 0–1.8)
BASOPHILS # BLD: 0.01 K/UL (ref 0–0.12)
BILIRUB SERPL-MCNC: 0.3 MG/DL (ref 0.1–1.5)
BLD GP AB SCN SERPL QL: NORMAL
BUN SERPL-MCNC: 11 MG/DL (ref 8–22)
CALCIUM SERPL-MCNC: 8.7 MG/DL (ref 8.5–10.5)
CHLORIDE SERPL-SCNC: 111 MMOL/L (ref 96–112)
CO2 SERPL-SCNC: 23 MMOL/L (ref 20–33)
CREAT SERPL-MCNC: 0.76 MG/DL (ref 0.5–1.4)
EOSINOPHIL # BLD AUTO: 0.02 K/UL (ref 0–0.51)
EOSINOPHIL NFR BLD: 0.6 % (ref 0–6.9)
ERYTHROCYTE [DISTWIDTH] IN BLOOD BY AUTOMATED COUNT: 43.6 FL (ref 35.9–50)
ETHANOL BLD-MCNC: 0 G/DL
GLOBULIN SER CALC-MCNC: 2.2 G/DL (ref 1.9–3.5)
GLUCOSE SERPL-MCNC: 95 MG/DL (ref 65–99)
HCG SERPL QL: NEGATIVE
HCT VFR BLD AUTO: 38.2 % (ref 37–47)
HGB BLD-MCNC: 11.8 G/DL (ref 12–16)
IMM GRANULOCYTES # BLD AUTO: 0.01 K/UL (ref 0–0.11)
IMM GRANULOCYTES NFR BLD AUTO: 0.3 % (ref 0–0.9)
LYMPHOCYTES # BLD AUTO: 1.08 K/UL (ref 1–4.8)
LYMPHOCYTES NFR BLD: 31.5 % (ref 22–41)
MCH RBC QN AUTO: 26.4 PG (ref 27–33)
MCHC RBC AUTO-ENTMCNC: 30.9 G/DL (ref 33.6–35)
MCV RBC AUTO: 85.5 FL (ref 81.4–97.8)
MONOCYTES # BLD AUTO: 0.18 K/UL (ref 0–0.85)
MONOCYTES NFR BLD AUTO: 5.2 % (ref 0–13.4)
NEUTROPHILS # BLD AUTO: 2.13 K/UL (ref 2–7.15)
NEUTROPHILS NFR BLD: 62.1 % (ref 44–72)
NRBC # BLD AUTO: 0 K/UL
NRBC BLD-RTO: 0 /100 WBC
PLATELET # BLD AUTO: 197 K/UL (ref 164–446)
PMV BLD AUTO: 10.5 FL (ref 9–12.9)
POTASSIUM SERPL-SCNC: 3.3 MMOL/L (ref 3.6–5.5)
PROT SERPL-MCNC: 6.6 G/DL (ref 6–8.2)
RBC # BLD AUTO: 4.47 M/UL (ref 4.2–5.4)
RH BLD: NORMAL
SODIUM SERPL-SCNC: 141 MMOL/L (ref 135–145)
WBC # BLD AUTO: 3.4 K/UL (ref 4.8–10.8)

## 2019-02-12 PROCEDURE — 70450 CT HEAD/BRAIN W/O DYE: CPT

## 2019-02-12 PROCEDURE — 86850 RBC ANTIBODY SCREEN: CPT

## 2019-02-12 PROCEDURE — 80053 COMPREHEN METABOLIC PANEL: CPT

## 2019-02-12 PROCEDURE — 72131 CT LUMBAR SPINE W/O DYE: CPT

## 2019-02-12 PROCEDURE — 71045 X-RAY EXAM CHEST 1 VIEW: CPT

## 2019-02-12 PROCEDURE — 305948 HCHG GREEN TRAUMA ACT PRE-NOTIFY NO CC

## 2019-02-12 PROCEDURE — 86900 BLOOD TYPING SEROLOGIC ABO: CPT

## 2019-02-12 PROCEDURE — 84703 CHORIONIC GONADOTROPIN ASSAY: CPT

## 2019-02-12 PROCEDURE — 72125 CT NECK SPINE W/O DYE: CPT

## 2019-02-12 PROCEDURE — 73552 X-RAY EXAM OF FEMUR 2/>: CPT | Mod: RT

## 2019-02-12 PROCEDURE — 86901 BLOOD TYPING SEROLOGIC RH(D): CPT

## 2019-02-12 PROCEDURE — 80307 DRUG TEST PRSMV CHEM ANLYZR: CPT

## 2019-02-12 PROCEDURE — 85025 COMPLETE CBC W/AUTO DIFF WBC: CPT

## 2019-02-12 PROCEDURE — 72170 X-RAY EXAM OF PELVIS: CPT

## 2019-02-12 PROCEDURE — 99285 EMERGENCY DEPT VISIT HI MDM: CPT

## 2019-02-12 PROCEDURE — 73700 CT LOWER EXTREMITY W/O DYE: CPT | Mod: RT

## 2019-02-12 RX ORDER — IBUPROFEN 200 MG
400 TABLET ORAL EVERY 8 HOURS PRN
Status: SHIPPED | COMMUNITY
End: 2021-11-21

## 2019-02-12 ASSESSMENT — LIFESTYLE VARIABLES: DO YOU DRINK ALCOHOL: NO
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
## 2019-02-20 NOTE — DISCHARGE PLANNING
Pt in triage requesting clearance to return to work. She has not followed up with Dr Garcia and ERP does not mention returning to work but to follow up with Dr Garcia. Pt was told that note could not be written and to call Dr Garcia's office.

## 2019-02-21 ENCOUNTER — HOSPITAL ENCOUNTER (EMERGENCY)
Facility: MEDICAL CENTER | Age: 24
End: 2019-02-21
Attending: EMERGENCY MEDICINE
Payer: COMMERCIAL

## 2019-02-21 VITALS
DIASTOLIC BLOOD PRESSURE: 85 MMHG | WEIGHT: 113.1 LBS | HEIGHT: 64 IN | OXYGEN SATURATION: 95 % | RESPIRATION RATE: 14 BRPM | HEART RATE: 95 BPM | SYSTOLIC BLOOD PRESSURE: 119 MMHG | TEMPERATURE: 97 F | BODY MASS INDEX: 19.31 KG/M2

## 2019-02-21 DIAGNOSIS — S79.911A INJURY OF RIGHT HIP, INITIAL ENCOUNTER: ICD-10-CM

## 2019-02-21 PROCEDURE — 99283 EMERGENCY DEPT VISIT LOW MDM: CPT

## 2019-02-21 ASSESSMENT — LIFESTYLE VARIABLES: DO YOU DRINK ALCOHOL: NO

## 2019-02-21 NOTE — ED TRIAGE NOTES
Chief Complaint   Patient presents with   • T-5000 GLF     Patient states she slipped on ice this am, c/o right sided hip pain. VSS. Ambulating in triage without difficulty.
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
## 2019-02-21 NOTE — DISCHARGE INSTRUCTIONS
Please contact your primary care physician or the clinic listed for a follow-up appointment.  Return to the emergency department if you develop any new or worsening symptoms including worsening pain, difficulty walking, redness of the hip, swelling, or any further concerns.

## 2019-06-25 ENCOUNTER — TELEPHONE (OUTPATIENT)
Dept: SCHEDULING | Facility: IMAGING CENTER | Age: 24
End: 2019-06-25

## 2020-01-09 ENCOUNTER — HOSPITAL ENCOUNTER (EMERGENCY)
Dept: HOSPITAL 8 - ED | Age: 25
Discharge: LEFT BEFORE DISCHARGE | End: 2020-01-09
Payer: MEDICAID

## 2020-01-09 VITALS — BODY MASS INDEX: 21.08 KG/M2 | WEIGHT: 123.46 LBS | HEIGHT: 64 IN

## 2020-01-09 VITALS — DIASTOLIC BLOOD PRESSURE: 47 MMHG | SYSTOLIC BLOOD PRESSURE: 117 MMHG

## 2020-01-09 DIAGNOSIS — Z53.21: Primary | ICD-10-CM

## 2021-07-13 ENCOUNTER — HOSPITAL ENCOUNTER (EMERGENCY)
Facility: MEDICAL CENTER | Age: 26
End: 2021-07-13
Attending: EMERGENCY MEDICINE
Payer: MEDICAID

## 2021-07-13 ENCOUNTER — APPOINTMENT (OUTPATIENT)
Dept: RADIOLOGY | Facility: MEDICAL CENTER | Age: 26
End: 2021-07-13
Attending: EMERGENCY MEDICINE
Payer: MEDICAID

## 2021-07-13 VITALS
SYSTOLIC BLOOD PRESSURE: 114 MMHG | HEIGHT: 64 IN | OXYGEN SATURATION: 94 % | RESPIRATION RATE: 18 BRPM | DIASTOLIC BLOOD PRESSURE: 78 MMHG | HEART RATE: 89 BPM | TEMPERATURE: 96.9 F | WEIGHT: 113.1 LBS | BODY MASS INDEX: 19.31 KG/M2

## 2021-07-13 DIAGNOSIS — S01.81XA FACIAL LACERATION, INITIAL ENCOUNTER: ICD-10-CM
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
## 2021-07-13 DIAGNOSIS — S02.40DA CLOSED FRACTURE OF LEFT SIDE OF MAXILLA, INITIAL ENCOUNTER (HCC): ICD-10-CM

## 2021-07-13 PROCEDURE — 70486 CT MAXILLOFACIAL W/O DYE: CPT

## 2021-07-13 PROCEDURE — 73030 X-RAY EXAM OF SHOULDER: CPT | Mod: LT

## 2021-07-13 PROCEDURE — 304217 HCHG IRRIGATION SYSTEM

## 2021-07-13 PROCEDURE — 700102 HCHG RX REV CODE 250 W/ 637 OVERRIDE(OP): Performed by: EMERGENCY MEDICINE

## 2021-07-13 PROCEDURE — 96375 TX/PRO/DX INJ NEW DRUG ADDON: CPT

## 2021-07-13 PROCEDURE — 304999 HCHG REPAIR-SIMPLE/INTERMED LEVEL 1

## 2021-07-13 PROCEDURE — 71045 X-RAY EXAM CHEST 1 VIEW: CPT

## 2021-07-13 PROCEDURE — A9270 NON-COVERED ITEM OR SERVICE: HCPCS | Performed by: EMERGENCY MEDICINE

## 2021-07-13 PROCEDURE — 303747 HCHG EXTRA SUTURE

## 2021-07-13 PROCEDURE — 96374 THER/PROPH/DIAG INJ IV PUSH: CPT

## 2021-07-13 PROCEDURE — 99284 EMERGENCY DEPT VISIT MOD MDM: CPT

## 2021-07-13 PROCEDURE — 700101 HCHG RX REV CODE 250: Performed by: EMERGENCY MEDICINE

## 2021-07-13 RX ORDER — LIDOCAINE HYDROCHLORIDE AND EPINEPHRINE 10; 10 MG/ML; UG/ML
10 INJECTION, SOLUTION INFILTRATION; PERINEURAL ONCE
Status: COMPLETED | OUTPATIENT
Start: 2021-07-13 | End: 2021-07-13

## 2021-07-13 RX ORDER — IBUPROFEN 600 MG/1
600 TABLET ORAL ONCE
Status: COMPLETED | OUTPATIENT
Start: 2021-07-13 | End: 2021-07-13

## 2021-07-13 RX ORDER — CEPHALEXIN 500 MG/1
500 CAPSULE ORAL 4 TIMES DAILY
Qty: 20 CAPSULE | Refills: 0 | Status: SHIPPED | OUTPATIENT
Start: 2021-07-13 | End: 2021-07-18

## 2021-07-13 RX ADMIN — IBUPROFEN 600 MG: 600 TABLET, FILM COATED ORAL at 15:27

## 2021-07-13 RX ADMIN — LIDOCAINE HYDROCHLORIDE,EPINEPHRINE BITARTRATE 10 ML: 10; .01 INJECTION, SOLUTION INFILTRATION; PERINEURAL at 15:45

## 2021-07-13 ASSESSMENT — LIFESTYLE VARIABLES: DO YOU DRINK ALCOHOL: YES
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
## 2021-07-13 NOTE — ED NOTES
Pt stated nervousness and hesitation to getting stitches. RN informed Pt on process involved suturing, removal of sutures, and healing process of wound. Pt stated that explanation was reassuring and felt a bit easier about impending suture placement.

## 2021-07-18 ENCOUNTER — HOSPITAL ENCOUNTER (EMERGENCY)
Facility: MEDICAL CENTER | Age: 26
End: 2021-07-18
Attending: EMERGENCY MEDICINE | Admitting: EMERGENCY MEDICINE
Payer: MEDICAID

## 2021-07-18 VITALS
OXYGEN SATURATION: 99 % | HEART RATE: 70 BPM | DIASTOLIC BLOOD PRESSURE: 70 MMHG | HEIGHT: 64 IN | RESPIRATION RATE: 17 BRPM | BODY MASS INDEX: 18.93 KG/M2 | TEMPERATURE: 98 F | SYSTOLIC BLOOD PRESSURE: 110 MMHG | WEIGHT: 110.89 LBS

## 2021-07-18 DIAGNOSIS — Z48.02 VISIT FOR SUTURE REMOVAL: ICD-10-CM

## 2021-07-18 DIAGNOSIS — Z51.89 ENCOUNTER FOR WOUND RE-CHECK: ICD-10-CM

## 2021-07-18 PROCEDURE — 99282 EMERGENCY DEPT VISIT SF MDM: CPT

## 2021-07-18 PROCEDURE — 99281 EMR DPT VST MAYX REQ PHY/QHP: CPT | Mod: EDC
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
## 2021-08-31 ENCOUNTER — HOSPITAL ENCOUNTER (EMERGENCY)
Dept: HOSPITAL 8 - ED | Age: 26
End: 2021-08-31
Payer: MEDICAID

## 2021-08-31 VITALS — HEIGHT: 64 IN | WEIGHT: 122.8 LBS | BODY MASS INDEX: 20.96 KG/M2

## 2021-08-31 VITALS — SYSTOLIC BLOOD PRESSURE: 116 MMHG | DIASTOLIC BLOOD PRESSURE: 80 MMHG
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
## 2021-08-31 DIAGNOSIS — M25.519: ICD-10-CM
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
## 2021-08-31 DIAGNOSIS — S16.1XXA: Primary | ICD-10-CM

## 2021-08-31 PROCEDURE — 99282 EMERGENCY DEPT VISIT SF MDM: CPT

## 2021-11-21 ENCOUNTER — APPOINTMENT (OUTPATIENT)
Dept: RADIOLOGY | Facility: MEDICAL CENTER | Age: 26
End: 2021-11-21
Attending: EMERGENCY MEDICINE
Payer: MEDICAID

## 2021-11-21 ENCOUNTER — HOSPITAL ENCOUNTER (EMERGENCY)
Facility: MEDICAL CENTER | Age: 26
End: 2021-11-21
Attending: EMERGENCY MEDICINE
Payer: MEDICAID

## 2021-11-21 VITALS
SYSTOLIC BLOOD PRESSURE: 131 MMHG | WEIGHT: 116.18 LBS | HEIGHT: 64 IN | DIASTOLIC BLOOD PRESSURE: 60 MMHG | HEART RATE: 88 BPM | OXYGEN SATURATION: 100 % | BODY MASS INDEX: 19.84 KG/M2 | TEMPERATURE: 98 F | RESPIRATION RATE: 18 BRPM

## 2021-11-21 DIAGNOSIS — O20.0 THREATENED MISCARRIAGE: ICD-10-CM

## 2021-11-21 LAB
ALBUMIN SERPL BCP-MCNC: 4.7 G/DL (ref 3.2–4.9)
ALBUMIN/GLOB SERPL: 2 G/DL
ALP SERPL-CCNC: 48 U/L (ref 30–99)
ALT SERPL-CCNC: 13 U/L (ref 2–50)
AMORPH CRY #/AREA URNS HPF: PRESENT /HPF
ANION GAP SERPL CALC-SCNC: 10 MMOL/L (ref 7–16)
APPEARANCE UR: CLEAR
AST SERPL-CCNC: 18 U/L (ref 12–45)
B-HCG SERPL-ACNC: 3750 MIU/ML (ref 0–5)
BACTERIA #/AREA URNS HPF: ABNORMAL /HPF
BASOPHILS # BLD AUTO: 0.2 % (ref 0–1.8)
BASOPHILS # BLD: 0.01 K/UL (ref 0–0.12)
BILIRUB SERPL-MCNC: 0.2 MG/DL (ref 0.1–1.5)
BILIRUB UR QL STRIP.AUTO: NEGATIVE
BUN SERPL-MCNC: 11 MG/DL (ref 8–22)
CALCIUM SERPL-MCNC: 9.2 MG/DL (ref 8.5–10.5)
CHLORIDE SERPL-SCNC: 103 MMOL/L (ref 96–112)
CO2 SERPL-SCNC: 23 MMOL/L (ref 20–33)
COLOR UR: YELLOW
CREAT SERPL-MCNC: 0.65 MG/DL (ref 0.5–1.4)
EOSINOPHIL # BLD AUTO: 0.06 K/UL (ref 0–0.51)
EOSINOPHIL NFR BLD: 1.2 % (ref 0–6.9)
EPI CELLS #/AREA URNS HPF: ABNORMAL /HPF
ERYTHROCYTE [DISTWIDTH] IN BLOOD BY AUTOMATED COUNT: 37.3 FL (ref 35.9–50)
GLOBULIN SER CALC-MCNC: 2.4 G/DL (ref 1.9–3.5)
GLUCOSE SERPL-MCNC: 84 MG/DL (ref 65–99)
GLUCOSE UR STRIP.AUTO-MCNC: NEGATIVE MG/DL
HCT VFR BLD AUTO: 37.6 % (ref 37–47)
HGB BLD-MCNC: 12.4 G/DL (ref 12–16)
HYALINE CASTS #/AREA URNS LPF: ABNORMAL /LPF
IMM GRANULOCYTES # BLD AUTO: 0.02 K/UL (ref 0–0.11)
IMM GRANULOCYTES NFR BLD AUTO: 0.4 % (ref 0–0.9)
KETONES UR STRIP.AUTO-MCNC: ABNORMAL MG/DL
LEUKOCYTE ESTERASE UR QL STRIP.AUTO: ABNORMAL
LYMPHOCYTES # BLD AUTO: 2.02 K/UL (ref 1–4.8)
LYMPHOCYTES NFR BLD: 39 % (ref 22–41)
MCH RBC QN AUTO: 28.1 PG (ref 27–33)
MCHC RBC AUTO-ENTMCNC: 33 G/DL (ref 33.6–35)
MCV RBC AUTO: 85.3 FL (ref 81.4–97.8)
MICRO URNS: ABNORMAL
MONOCYTES # BLD AUTO: 0.36 K/UL (ref 0–0.85)
MONOCYTES NFR BLD AUTO: 6.9 % (ref 0–13.4)
MUCOUS THREADS #/AREA URNS HPF: ABNORMAL /HPF
NEUTROPHILS # BLD AUTO: 2.71 K/UL (ref 2–7.15)
NEUTROPHILS NFR BLD: 52.3 % (ref 44–72)
NITRITE UR QL STRIP.AUTO: NEGATIVE
NRBC # BLD AUTO: 0 K/UL
NRBC BLD-RTO: 0 /100 WBC
NUMBER OF RH DOSES IND 8505RD: NORMAL
PH UR STRIP.AUTO: 7.5 [PH] (ref 5–8)
PLATELET # BLD AUTO: 181 K/UL (ref 164–446)
PMV BLD AUTO: 10.2 FL (ref 9–12.9)
POTASSIUM SERPL-SCNC: 3.5 MMOL/L (ref 3.6–5.5)
PROT SERPL-MCNC: 7.1 G/DL (ref 6–8.2)
PROT UR QL STRIP: NEGATIVE MG/DL
RBC # BLD AUTO: 4.41 M/UL (ref 4.2–5.4)
RBC # URNS HPF: ABNORMAL /HPF
RBC UR QL AUTO: NEGATIVE
RH BLD: NORMAL
SODIUM SERPL-SCNC: 136 MMOL/L (ref 135–145)
SP GR UR STRIP.AUTO: 1.03
UROBILINOGEN UR STRIP.AUTO-MCNC: 1 MG/DL
WBC # BLD AUTO: 5.2 K/UL (ref 4.8–10.8)
WBC #/AREA URNS HPF: ABNORMAL /HPF

## 2021-11-21 PROCEDURE — 86901 BLOOD TYPING SEROLOGIC RH(D): CPT

## 2021-11-21 PROCEDURE — 36415 COLL VENOUS BLD VENIPUNCTURE: CPT

## 2021-11-21 PROCEDURE — 76801 OB US < 14 WKS SINGLE FETUS: CPT

## 2021-11-21 PROCEDURE — 80053 COMPREHEN METABOLIC PANEL: CPT

## 2021-11-21 PROCEDURE — 84702 CHORIONIC GONADOTROPIN TEST: CPT

## 2021-11-21 PROCEDURE — 85025 COMPLETE CBC W/AUTO DIFF WBC: CPT

## 2021-11-21 PROCEDURE — 99284 EMERGENCY DEPT VISIT MOD MDM: CPT

## 2021-11-21 PROCEDURE — 81001 URINALYSIS AUTO W/SCOPE: CPT

## 2021-11-22 NOTE — ED TRIAGE NOTES
"Chief Complaint   Patient presents with   • Pregnancy     LMP 10/19. . Pt reports having 2 home pregnancy test and had one gush of blood this morning. Pt has denied any further bleeding or any clots since that one smear of blood.      /90   Pulse 82   Temp 36.7 °C (98.1 °F) (Temporal)   Resp 18   Ht 1.626 m (5' 4\")   Wt 52.7 kg (116 lb 2.9 oz)   SpO2 97% Comment: Room air  BMI 19.94 kg/m²   Pt placed back in lobby, educated on triage process, and told to inform staff of any change in condition.     "

## 2021-11-22 NOTE — ED PROVIDER NOTES
"ED Provider Note    Scribed for Dr. Jorge Rodriges M.D. by Preston Jason. 2021  9:25 PM    Primary care provider: Patient reports no primary care at this time  Means of arrival: Walk-In  History obtained from: Patient  History limited by: None    CHIEF COMPLAINT  Chief Complaint   Patient presents with   • Pregnancy     LMP 10/19. . Pt reports having 2 home pregnancy test and had one gush of blood this morning. Pt has denied any further bleeding or any clots since that one smear of blood.    • Spotting     HPI  Ines Friedman is a 26 y.o. female who presents to the Emergency Department for vaginal bleeding onset this morning. Patient reports having multiple positive home pregnancy tests. Furthermore, she states the bleeding was \"one gush of blood\" which prompted her to present to the ED. She further reports having children previously. Patient reports associated abdominal cramps. There are no alleviating or exacerbating factors reported at this time. She denies associated fever or chills.     REVIEW OF SYSTEMS  Pertinent positives include vaginal bleeding, abdominal cramps. Pertinent negatives include no fever or chills. As above, all other systems reviewed and are negative.   See HPI for further details.     PAST MEDICAL HISTORY       SURGICAL HISTORY   has a past surgical history that includes repeat c section (2018); dilation and evacuation (N/A, 2017); and primary c section (2015).    SOCIAL HISTORY  Social History     Tobacco Use   • Smoking status: Never Smoker   • Smokeless tobacco: Never Used   Substance Use Topics   • Alcohol use: No     Comment: socially   • Drug use: No      Social History     Substance and Sexual Activity   Drug Use No       FAMILY HISTORY  Family History   Problem Relation Age of Onset   • No Known Problems Mother    • No Known Problems Father        CURRENT MEDICATIONS  Home Medications     Reviewed by Alisson Chambers R.N. (Registered Nurse) on 21 at " "1942  Med List Status: Complete   Medication Last Dose Status        Patient Patel Taking any Medications                       ALLERGIES  No Known Allergies    PHYSICAL EXAM  VITAL SIGNS: /90   Pulse 82   Temp 36.7 °C (98.1 °F) (Temporal)   Resp 18   Ht 1.626 m (5' 4\")   Wt 52.7 kg (116 lb 2.9 oz)   SpO2 97% Comment: Room air  BMI 19.94 kg/m²     Constitutional: Well developed, Well nourished, no acute distress, Non-toxic appearance.    .   Lymphatic: No lymphadenopathy noted.   Cardiovascular: Normal heart rate, Normal rhythm.   Thorax & Lungs: Clear to auscultation bilaterally, No respiratory distress, No wheezing, No crackles.   Abdomen: Soft, minimal tenderness, No masses, No pulsatile masses.   Skin: Warm, Dry, No erythema, No rash.   Extremities:, No edema No cyanosis.   Musculoskeletal: No tenderness to palpation or major deformities noted.  Intact distal pulses  Neurologic: Awake, alert. Moves all extremities spontaneously.  Psychiatric: Affect normal, Judgment normal, Mood normal.     LABS  Results for orders placed or performed during the hospital encounter of 11/21/21   CBC WITH DIFFERENTIAL   Result Value Ref Range    WBC 5.2 4.8 - 10.8 K/uL    RBC 4.41 4.20 - 5.40 M/uL    Hemoglobin 12.4 12.0 - 16.0 g/dL    Hematocrit 37.6 37.0 - 47.0 %    MCV 85.3 81.4 - 97.8 fL    MCH 28.1 27.0 - 33.0 pg    MCHC 33.0 (L) 33.6 - 35.0 g/dL    RDW 37.3 35.9 - 50.0 fL    Platelet Count 181 164 - 446 K/uL    MPV 10.2 9.0 - 12.9 fL    Neutrophils-Polys 52.30 44.00 - 72.00 %    Lymphocytes 39.00 22.00 - 41.00 %    Monocytes 6.90 0.00 - 13.40 %    Eosinophils 1.20 0.00 - 6.90 %    Basophils 0.20 0.00 - 1.80 %    Immature Granulocytes 0.40 0.00 - 0.90 %    Nucleated RBC 0.00 /100 WBC    Neutrophils (Absolute) 2.71 2.00 - 7.15 K/uL    Lymphs (Absolute) 2.02 1.00 - 4.80 K/uL    Monos (Absolute) 0.36 0.00 - 0.85 K/uL    Eos (Absolute) 0.06 0.00 - 0.51 K/uL    Baso (Absolute) 0.01 0.00 - 0.12 K/uL    Immature " Granulocytes (abs) 0.02 0.00 - 0.11 K/uL    NRBC (Absolute) 0.00 K/uL   COMP METABOLIC PANEL   Result Value Ref Range    Sodium 136 135 - 145 mmol/L    Potassium 3.5 (L) 3.6 - 5.5 mmol/L    Chloride 103 96 - 112 mmol/L    Co2 23 20 - 33 mmol/L    Anion Gap 10.0 7.0 - 16.0    Glucose 84 65 - 99 mg/dL    Bun 11 8 - 22 mg/dL    Creatinine 0.65 0.50 - 1.40 mg/dL    Calcium 9.2 8.5 - 10.5 mg/dL    AST(SGOT) 18 12 - 45 U/L    ALT(SGPT) 13 2 - 50 U/L    Alkaline Phosphatase 48 30 - 99 U/L    Total Bilirubin 0.2 0.1 - 1.5 mg/dL    Albumin 4.7 3.2 - 4.9 g/dL    Total Protein 7.1 6.0 - 8.2 g/dL    Globulin 2.4 1.9 - 3.5 g/dL    A-G Ratio 2.0 g/dL   RH TYPE FOR RHOGAM FROM E.D.   Result Value Ref Range    Emergency Department Rh Typing POS     Number Of Rh Doses Indicated ZERO    HCG QUANTITATIVE   Result Value Ref Range    Bhcg 3750.0 (H) 0.0 - 5.0 mIU/mL   URINALYSIS,CULTURE IF INDICATED    Specimen: Urine   Result Value Ref Range    Color Yellow     Character Clear     Specific Gravity 1.028 <1.035    Ph 7.5 5.0 - 8.0    Glucose Negative Negative mg/dL    Ketones Trace (A) Negative mg/dL    Protein Negative Negative mg/dL    Bilirubin Negative Negative    Urobilinogen, Urine 1.0 Negative    Nitrite Negative Negative    Leukocyte Esterase Trace (A) Negative    Occult Blood Negative Negative    Micro Urine Req Microscopic    URINE MICROSCOPIC (W/UA)   Result Value Ref Range    WBC 0-2 /hpf    RBC 0-2 /hpf    Bacteria Few (A) None /hpf    Epithelial Cells Few /hpf    Mucous Threads Few /hpf    Amorphous Crystal Present /hpf    Hyaline Cast 0-2 /lpf   ESTIMATED GFR   Result Value Ref Range    GFR If African American >60 >60 mL/min/1.73 m 2    GFR If Non African American >60 >60 mL/min/1.73 m 2      All labs reviewed by me.    RADIOLOGY  US-OB 1ST TRIMESTER WITH TRANSVAGINAL (COMBO)   Final Result      1.  Viable single intrauterine gestation of an estimated 5 weeks 3 days gestation. Estimated date of delivery of 7/24/2022.   2.   Large subchorionic hemorrhage measuring up to 3.4 cm.        The radiologist's interpretation of all radiological studies have been reviewed by me.    COURSE & MEDICAL DECISION MAKING  Pertinent Labs & Imaging studies reviewed. (See chart for details)    9:25 PM - Patient seen and examined at bedside. Ordered US-OB 1st Trimester w/Transvaginal, CBC w/diff, CMP, RH Type for Rhogam, HCG Quantitative, UA, Urine Microscopic to evaluate her symptoms. Discussed plan of care with patient. I informed them that labs and imaging will be ordered to evaluate symptoms. Patient is understanding and agreeable with plan. The differential diagnoses include but are not limited to: dual pregnancy vs miscarriage vs threatened miscarriage.    9:38 PM - Ordered Estimated GFR.    10:53 PM - Patient was reevaluated at bedside. Informed patient that imaging shows she is 5 weeks pregnant and that the results are also reassuring. I then informed the patient of my plan for discharge, which includes strict return precautions for any new or worsening symptoms. She understands and verbalizes agreement to plan of care. She is comfortable going home at this time.      Decision Making:  This is a patient with a 5-week intrauterine pregnancy who had some subchorionic hemorrhage that seems to have stopped with no bleeding since this morning no other red abnormality identified.  Patient will be given instructions related to threatened miscarriage and vaginal bleeding in the first trimester    The patient will return for new or worsening symptoms and is stable at the time of discharge.    The patient is referred to a primary physician for blood pressure management, diabetic screening, and for all other preventative health concerns.    DISPOSITION:  Patient will be discharged home in stable condition.    FOLLOW UP:  No follow-up provider specified.    FINAL IMPRESSION  1. Threatened miscarriage          I, Preston Jason (Scriblakshmi), am scribing for, and in  the presence ofJorge M.D..    Electronically signed by: Preston Jason (Scribe), 11/21/2021    I, Jorge Rodriges M.D. personally performed the services described in this documentation, as scribed by Preston Jason in my presence, and it is both accurate and complete.    The note accurately reflects work and decisions made by me.  Jorge Rodriges M.D.  11/21/2021  11:03 PM

## 2021-11-22 NOTE — ED NOTES
"Pt discharged home. IV discontinued and gauze placed, pt in possession of belongings. Pt provided discharge education and information pertaining to medications and follow up appointments. Pt received copy of discharge instructions and verbalized understanding. /69   Pulse 73   Temp 36.7 °C (98 °F) (Temporal)   Resp 18   Ht 1.626 m (5' 4\")   Wt 52.7 kg (116 lb 2.9 oz)   SpO2 100%   BMI 19.94 kg/m²     "

## 2021-12-02 ENCOUNTER — TELEPHONE (OUTPATIENT)
Dept: OBGYN | Facility: CLINIC | Age: 26
End: 2021-12-02

## 2021-12-02 NOTE — TELEPHONE ENCOUNTER
** Pt called and left message wanting to farooq appt. I called Pt bck 2xs and LVMs to call us back to schedule** OV

## 2021-12-20 ENCOUNTER — APPOINTMENT (OUTPATIENT)
Dept: OBGYN | Facility: CLINIC | Age: 26
End: 2021-12-20

## 2022-01-24 ENCOUNTER — APPOINTMENT (OUTPATIENT)
Dept: OBGYN | Facility: CLINIC | Age: 27
End: 2022-01-24

## 2022-01-24 ENCOUNTER — INITIAL PRENATAL (OUTPATIENT)
Dept: OBGYN | Facility: CLINIC | Age: 27
End: 2022-01-24

## 2022-01-24 VITALS — WEIGHT: 121 LBS | BODY MASS INDEX: 20.77 KG/M2 | DIASTOLIC BLOOD PRESSURE: 70 MMHG | SYSTOLIC BLOOD PRESSURE: 120 MMHG

## 2022-01-24 DIAGNOSIS — O09.299 HX OF PREECLAMPSIA, PRIOR PREGNANCY, CURRENTLY PREGNANT: ICD-10-CM

## 2022-01-24 DIAGNOSIS — Z34.90 PRENATAL CARE, ANTEPARTUM: ICD-10-CM

## 2022-01-24 LAB
APPEARANCE UR: NORMAL
APPEARANCE UR: NORMAL
BILIRUB UR STRIP-MCNC: NORMAL MG/DL
BILIRUB UR STRIP-MCNC: NORMAL MG/DL
COLOR UR AUTO: NORMAL
COLOR UR AUTO: NORMAL
GLUCOSE UR STRIP.AUTO-MCNC: NEGATIVE MG/DL
GLUCOSE UR STRIP.AUTO-MCNC: NEGATIVE MG/DL
KETONES UR STRIP.AUTO-MCNC: NEGATIVE MG/DL
KETONES UR STRIP.AUTO-MCNC: NEGATIVE MG/DL
LEUKOCYTE ESTERASE UR QL STRIP.AUTO: NEGATIVE
LEUKOCYTE ESTERASE UR QL STRIP.AUTO: NEGATIVE
NITRITE UR QL STRIP.AUTO: NEGATIVE
NITRITE UR QL STRIP.AUTO: NEGATIVE
PH UR STRIP.AUTO: 6 [PH] (ref 5–8)
PH UR STRIP.AUTO: 6 [PH] (ref 5–8)
PROT UR QL STRIP: NORMAL MG/DL
PROT UR QL STRIP: NORMAL MG/DL
RBC UR QL AUTO: NORMAL
RBC UR QL AUTO: NORMAL
SP GR UR STRIP.AUTO: >=1.03
SP GR UR STRIP.AUTO: >=1.03
UROBILINOGEN UR STRIP-MCNC: NORMAL MG/DL
UROBILINOGEN UR STRIP-MCNC: NORMAL MG/DL

## 2022-01-24 PROCEDURE — 59402 PR NEW OB HIGH RISK: CPT | Performed by: ADVANCED PRACTICE MIDWIFE

## 2022-01-24 PROCEDURE — 81002 URINALYSIS NONAUTO W/O SCOPE: CPT | Performed by: ADVANCED PRACTICE MIDWIFE

## 2022-01-24 RX ORDER — ONDANSETRON 4 MG/1
4 TABLET, ORALLY DISINTEGRATING ORAL EVERY 6 HOURS PRN
Qty: 10 TABLET | Refills: 1 | Status: SHIPPED | OUTPATIENT
Start: 2022-01-24 | End: 2022-02-10 | Stop reason: SDUPTHER

## 2022-01-24 RX ORDER — FAMOTIDINE 20 MG/1
20 TABLET, FILM COATED ORAL NIGHTLY
Qty: 30 TABLET | Refills: 1 | Status: SHIPPED | OUTPATIENT
Start: 2022-01-24 | End: 2022-02-10 | Stop reason: SDUPTHER

## 2022-01-24 ASSESSMENT — EDINBURGH POSTNATAL DEPRESSION SCALE (EPDS)
I HAVE BEEN ABLE TO LAUGH AND SEE THE FUNNY SIDE OF THINGS: AS MUCH AS I ALWAYS COULD
I HAVE LOOKED FORWARD WITH ENJOYMENT TO THINGS: AS MUCH AS I EVER DID
I HAVE BEEN SO UNHAPPY THAT I HAVE HAD DIFFICULTY SLEEPING: YES, SOMETIMES
TOTAL SCORE: 12
I HAVE FELT SAD OR MISERABLE: NOT VERY OFTEN
I HAVE BEEN SO UNHAPPY THAT I HAVE BEEN CRYING: YES, QUITE OFTEN
I HAVE BEEN ANXIOUS OR WORRIED FOR NO GOOD REASON: YES, SOMETIMES
I HAVE FELT SCARED OR PANICKY FOR NO GOOD REASON: YES, SOMETIMES
THE THOUGHT OF HARMING MYSELF HAS OCCURRED TO ME: NEVER
I HAVE BLAMED MYSELF UNNECESSARILY WHEN THINGS WENT WRONG: YES, SOME OF THE TIME
THINGS HAVE BEEN GETTING ON TOP OF ME: NO, MOST OF THE TIME I HAVE COPED QUITE WELL

## 2022-01-24 ASSESSMENT — FIBROSIS 4 INDEX: FIB4 SCORE: 0.72

## 2022-01-24 NOTE — PROGRESS NOTES
Risk factors:   Preeclampsia in first pregnancy, hx of c/s x 3  Referrals made today:   none    Subjective:   Ines Friedman is a 26 y.o.  who presents for her new OB exam.  She is 13w6d  with an PEPE of Estimated Date of Delivery: 22 by LMP.   She is feeling well and has no concerns at this time. She reports ER visits for bleeding in 2021 but no previous care in this pregnancy. Reports absent fetal movement.    Vaginal bleeding Yes; no bleeding since 2021  Pain   Some lower back  Cramping  no    History reviewed. No pertinent past medical history.    Psych History   Depression:   no  Anxiety    no  Bipolar    no  Postpartum Mood Changes Yes; never treated    Past Surgical History:   Procedure Laterality Date   • REPEAT C SECTION  2018    Procedure: REPEAT C SECTION;  Surgeon: Jenni Worrell M.D.;  Location: LABOR AND DELIVERY;  Service: Labor and Delivery   • DILATION AND EVACUATION N/A 2017    Procedure: DILATION AND EVACUATION;  Surgeon: Pawan Brooks M.D.;  Location: SURGERY SAME DAY [facility];  Service:    • PRIMARY C SECTION  2015    Procedure: PRIMARY C SECTION;  Surgeon: Jenni Worrell M.D.;  Location: LABOR AND DELIVERY;  Service:         OB History    Para Term  AB Living   5 3 3 0 1 3   SAB IAB Ectopic Molar Multiple Live Births   1 0 0 0   3      # Outcome Date GA Lbr Serg/2nd Weight Sex Delivery Anes PTL Lv   5 Current            4 Term 18 39w1d  3.175 kg (7 lb) F  Spinal N RUTH      Birth Comments: Repeat C/S walk in pt.    3 SAB 17 6w0d    SAB         Birth Comments: Dilatation and evacuation of the uterus.   2 Term 16 39w0d  2.863 kg (6 lb 5 oz) F CS-LTranv Spinal N RUTH      Birth Comments: Repeat C/S    1 Term 11/19/15 37w5d  2.9 kg (6 lb 6.3 oz) M CS-LTranv Spinal N RUTH      Birth Comments: Primary C/S due preeclampsia      Complications: Pre-eclampsia      Obstetric Comments   Pregnancy unplanned but desired. FOB no  involved. Pt works for [employer]. On feet for 12 hours.         Gynecological History  STIs  Chlamydia but treated  HSV  no  Abnormal pap no      Family History   Problem Relation Age of Onset   • No Known Problems Mother    • No Known Problems Father      Denies any genetic disorders in family history.     FOB is not involved   Pregnancy is un planned but desired.    She is currently working at [employer] .   Denies alcohol use, drug use, or tobacco use in pregnancy.     Denies any current or hx of sexual, emotional or physical abuse or trauma.     Current Medications: PNV    Allergies: NKDA      Objective:      Vitals:    22 0856   BP: 120/70        See Prenatal Physical and Prenatal Vitals  UA WNL today    Transabdominal Ultrasound  Indication: Dating- prior US with no cardiac activity    Findings:  BPD 2.63 2.50 2.58   HC 9.18 10.09 10.13  AC 8.12 8.53 8.16  FL 1.35 1.26 1.42  CRL 7.16    FHR: 142 bpm      Impression: Single intrauterine pregnancy measuring 14 weeks and 2 days with PEPE 2022.    Per ACOG dating guidelines, final PEPE is 2022 based on LMP consistent with US today.    Ultrasound performed and read by myself.         Assessment:      1.  IUP @ 13w6d per LMP      2.  S=D      3.  See problem list as follows       Patient Active Problem List    Diagnosis Date Noted   • Hx of preeclampsia, prior pregnancy, currently pregnant 2016   • History of  delivery affecting pregnancy 2016   • Keloid scar 2016   • History of seizure 2016         Plan:   -  Pap done today  - Prenatal labs ordered - lab slip provided. AFP added  - Discussed PNV, nutrition, adequate water intake, and exercise/weight gain in pregnancy  - S/sx of pregnancy warning signs and PTL precautions given  - Complete OB US in 6 wks  - Return to clinic in 4 weeks.    History of preeclampsia  We discussed extra labs today. She should start baby aspirin 81mg daily.     History of  x 3  We  discussed today plan for repeat  at 39 weeks. We reviewed risks associated with multiple  deliveries. I have cautioned her to consider BTL related to increased risks associated with repeat  deliveries. She agrees and has been strongly considering BTL. She is aware that we no longer tie tubes but recommend complete removal of both tubes.

## 2022-01-24 NOTE — PROGRESS NOTES
NOB today  LMP: 10/15/21  Last pap: Today  [phone] (home)   Pharmacy confirmed  On PNV  AFP desired   Flu vaccine declined   C/o some nausea and vomiting. Lots of heartburn   EPDS 12

## 2022-02-03 DIAGNOSIS — Z34.90 PRENATAL CARE, ANTEPARTUM: ICD-10-CM

## 2022-02-09 ENCOUNTER — TELEPHONE (OUTPATIENT)
Dept: OBGYN | Facility: CLINIC | Age: 27
End: 2022-02-09

## 2022-02-09 NOTE — TELEPHONE ENCOUNTER
Phone Number Called: [phone] (home)       Call outcome: Left detailed message for patient. Informed to call back with any additional questions.    Message: I LVM that we have her recent pap smear results and to please call us back for those results. I also mentioned that she can view them on [employer] if she does not want to call.

## 2022-02-09 NOTE — TELEPHONE ENCOUNTER
----- Message from ZAC Garcia sent at 2/8/2022 10:50 AM PST -----  Noted; pap WNL. Next pap due in 1/2025. Will treat yeast. Rx sent and will notify patient. Gonorrhea and chlamydia are negative.

## 2022-02-10 RX ORDER — ONDANSETRON 4 MG/1
4 TABLET, ORALLY DISINTEGRATING ORAL EVERY 6 HOURS PRN
Qty: 10 TABLET | Refills: 1 | Status: SHIPPED | OUTPATIENT
Start: 2022-02-10 | End: 2022-03-14 | Stop reason: SDUPTHER

## 2022-02-10 RX ORDER — FAMOTIDINE 20 MG/1
20 TABLET, FILM COATED ORAL NIGHTLY
Qty: 30 TABLET | Refills: 1 | Status: SHIPPED | OUTPATIENT
Start: 2022-02-10 | End: 2022-03-14 | Stop reason: SDUPTHER

## 2022-02-22 ENCOUNTER — ROUTINE PRENATAL (OUTPATIENT)
Dept: OBGYN | Facility: CLINIC | Age: 27
End: 2022-02-22
Payer: MEDICAID

## 2022-02-22 ENCOUNTER — APPOINTMENT (OUTPATIENT)
Dept: OBGYN | Facility: CLINIC | Age: 27
End: 2022-02-22
Payer: MEDICAID

## 2022-02-22 VITALS — DIASTOLIC BLOOD PRESSURE: 48 MMHG | BODY MASS INDEX: 22.31 KG/M2 | WEIGHT: 130 LBS | SYSTOLIC BLOOD PRESSURE: 92 MMHG

## 2022-02-22 DIAGNOSIS — O34.219 HISTORY OF CESAREAN DELIVERY AFFECTING PREGNANCY: ICD-10-CM

## 2022-02-22 DIAGNOSIS — O09.92 SUPERVISION OF HIGH-RISK PREGNANCY, SECOND TRIMESTER: Primary | ICD-10-CM

## 2022-02-22 DIAGNOSIS — O09.299 HX OF PREECLAMPSIA, PRIOR PREGNANCY, CURRENTLY PREGNANT: ICD-10-CM

## 2022-02-22 PROCEDURE — 90040 PR PRENATAL FOLLOW UP: CPT

## 2022-02-22 RX ORDER — ONDANSETRON 4 MG/1
4 TABLET, FILM COATED ORAL EVERY 4 HOURS PRN
Qty: 20 TABLET | Refills: 2 | Status: ON HOLD | OUTPATIENT
Start: 2022-02-22 | End: 2022-07-22

## 2022-02-22 ASSESSMENT — FIBROSIS 4 INDEX: FIB4 SCORE: 0.72

## 2022-02-22 NOTE — PROGRESS NOTES
S: Ines here for routine OB follow up.  Reports possible FM once or twice, nothing consistent that she is sure she felt.  Denies VB, LOF,  RUCs or vaginal DC.  Feeling lots of nausea and vomitting - having trouble eating a balanced diet because so much turns her stomach. She has not collected labs because when she went to [facility] they were closed and she lost the slips.     O:    Vitals:    22 1349   BP: (!) 92/48   Weight: 59 kg (130 lb)     Uterus size U-2      See flow sheet.    A: IUP 18w0d  S=D  Encounter Diagnoses   Name Primary?   • Supervision of high-risk pregnancy, second trimester Yes   • Hx of preeclampsia, prior pregnancy, currently pregnant    • History of  delivery affecting pregnancy        P:  1.  Patient to collect labs this week, reviewed importance and lab slips reprinted.         2.  desires AFP, previously ordered and reprinted today to collect at [facility].        3.  US is scheduled for .        4.  Questions answered.        5.  Encouraged adequate water intake, note for work given to allow having a water bottle with her at work.        6. Reviewed lifestyle changes for N/V of pregnancy including small meals, eating before rising, sour candies, and avoiding triggering foods and situations. Requests zofran refill, sent today.         6.  F/u 4 wks and PRN with concerns    Iram Salinas C.N.M.

## 2022-02-22 NOTE — LETTER
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  Her hours/activities need to be modified.  She needs to be able to have water with her during work hours and have access to it at all times. She should not lift over 20 pounds repetitively.          Thank you,          Iram Salinas C.N.M.    Electronically Signed

## 2022-02-22 NOTE — PROGRESS NOTES
Pt here today for OB follow up  Reports +FM  WT: 130 lb  BP: 92/48  Preferred pharmacy verified with pt.  Pt states she still feeling nauseated. States no other complaints.   Prenatal labs not yet done. Pt states she will get blood work done this week.   Good # [phone]

## 2022-03-07 ENCOUNTER — APPOINTMENT (OUTPATIENT)
Dept: RADIOLOGY | Facility: IMAGING CENTER | Age: 27
End: 2022-03-07
Attending: ADVANCED PRACTICE MIDWIFE
Payer: MEDICAID

## 2022-03-07 DIAGNOSIS — Z34.90 PRENATAL CARE, ANTEPARTUM: ICD-10-CM

## 2022-03-07 PROCEDURE — 76805 OB US >/= 14 WKS SNGL FETUS: CPT | Mod: TC | Performed by: ADVANCED PRACTICE MIDWIFE

## 2022-03-11 ENCOUNTER — TELEPHONE (OUTPATIENT)
Dept: OBGYN | Facility: CLINIC | Age: 27
End: 2022-03-11
Payer: MEDICAID

## 2022-03-11 DIAGNOSIS — O28.3 ABNORMAL FETAL ULTRASOUND: ICD-10-CM

## 2022-03-11 NOTE — TELEPHONE ENCOUNTER
No answer left message for pt to call back.    ----- Message from ZAC Garcia sent at 3/11/2022 11:58 AM PST -----  Noted, refer to perinatology for follow up imaging.

## 2022-03-18 ENCOUNTER — TELEPHONE (OUTPATIENT)
Dept: OBGYN | Facility: CLINIC | Age: 27
End: 2022-03-18
Payer: MEDICAID

## 2022-03-18 NOTE — TELEPHONE ENCOUNTER
Tried calling patient but phone is out of service.  ----- Message from ZAC Garcia sent at 3/11/2022 11:58 AM PST -----  Noted, refer to perinatology for follow up imaging.

## 2022-03-23 ENCOUNTER — ROUTINE PRENATAL (OUTPATIENT)
Dept: OBGYN | Facility: CLINIC | Age: 27
End: 2022-03-23
Payer: MEDICAID

## 2022-03-23 VITALS — WEIGHT: 128.6 LBS | BODY MASS INDEX: 22.07 KG/M2 | SYSTOLIC BLOOD PRESSURE: 116 MMHG | DIASTOLIC BLOOD PRESSURE: 54 MMHG

## 2022-03-23 DIAGNOSIS — O34.219 HISTORY OF CESAREAN DELIVERY AFFECTING PREGNANCY: ICD-10-CM

## 2022-03-23 DIAGNOSIS — O09.92 SUPERVISION OF HIGH RISK PREGNANCY IN SECOND TRIMESTER: Primary | ICD-10-CM

## 2022-03-23 DIAGNOSIS — O28.3 ABNORMAL FETAL ULTRASOUND: ICD-10-CM

## 2022-03-23 DIAGNOSIS — O09.299 HX OF PREECLAMPSIA, PRIOR PREGNANCY, CURRENTLY PREGNANT: ICD-10-CM

## 2022-03-23 PROBLEM — O09.90 SUPERVISION OF HIGH-RISK PREGNANCY: Status: ACTIVE | Noted: 2022-03-23

## 2022-03-23 PROCEDURE — 90040 PR PRENATAL FOLLOW UP: CPT

## 2022-03-23 ASSESSMENT — FIBROSIS 4 INDEX: FIB4 SCORE: 0.72

## 2022-03-23 NOTE — PROGRESS NOTES
"S:  Ines here today with sister.  Reports good FM.  Denies VB, LOF, RUCs, or vaginal DC.  Some mild lower cramping but resolves spontaneously. Has had some zinging pains across abdomen that are c/w round ligament pain vs painful scar tissue.     O:    Vitals:    22 1508   BP: 116/54   Weight: 58.3 kg (128 lb 9.6 oz)     See flow sheet    Complete OB US on 3/7/22  \"IMPRESSION:     Single intrauterine pregnancy of an estimated gestational age of 20 weeks 1 day with an estimated date of delivery of 2022.     Difficult study due to fetal position.     Small bilateral choroid plexus cysts, left greater than right.     Echogenic intracardiac focus in the fetal heart.     Small pericardial effusion.     Slightly increased bowel echogenicity, a nonspecific finding.     Other fetal structures are unremarkable.     Close follow-up is advised.\"    A:  IUP 22w1d  S=D  Poor weight gain in pregnancy  Patient Active Problem List    Diagnosis Date Noted   • Supervision of high-risk pregnancy 2022   • Abnormal fetal ultrasound 2022   • Hx of preeclampsia, prior pregnancy, currently pregnant 2016   • History of  delivery affecting pregnancy 2016   • Keloid scar 2016   • History of seizure 2016        P:  1.  Reviewed labs, ultrasound w pt.          2.  Questions answered.          3.  Encouraged adequate water intake, food intake        4.  F/u 4 wks and PRN        5.  Referral to MFM reviewed, patient to call for appt ASAP        6.   Reviewed comfort measures in prengnancy    Iram Salinas C.N.M.      "

## 2022-03-23 NOTE — PROGRESS NOTES
Pt here today for OB follow up  Reports +FM  WT: 128.6 lb  BP: 116/54  Preferred pharmacy verified with pt.  Pt states was not able to schedule an appt at Dr Gao office.   Pt states she had her blood work done today.   Pt states she last week she felt like a strong contraction that lasted 2 minutes. States no complaints or concerns today  Pt states she has been trying several times contacting Dr. Felipe's office but has been put on hold and since she is at work it's hard to be on the phone.   Good # [phone]

## 2022-03-23 NOTE — PATIENT INSTRUCTIONS
Referral information sent to the following:  Obstetrics & Gynecology, Maternal & Fetal Medicine     DR NATHALIE MORGAN Y  [address]  [place] [address]  Phone: [phone]  Fax: [phone]

## 2022-03-24 ENCOUNTER — TELEPHONE (OUTPATIENT)
Dept: OBGYN | Facility: CLINIC | Age: 27
End: 2022-03-24
Payer: MEDICAID

## 2022-03-24 NOTE — LETTER
March 24, 2022          Dear Ines Friedman,      Please call us regarding your care.  One of the nurses is available to speak with you Monday through Friday, 8 a.m. to 5 p.m.    Please call us at [phone]; thank you for your prompt attention.        Sincerely,          ZAC Garcia    Electronically Signed

## 2022-03-25 RX ORDER — FAMOTIDINE 20 MG/1
20 TABLET, FILM COATED ORAL NIGHTLY
Qty: 30 TABLET | Refills: 1 | Status: ON HOLD | OUTPATIENT
Start: 2022-03-25 | End: 2022-07-22

## 2022-03-25 RX ORDER — ONDANSETRON 4 MG/1
4 TABLET, ORALLY DISINTEGRATING ORAL EVERY 6 HOURS PRN
Qty: 10 TABLET | Refills: 1 | Status: ON HOLD | OUTPATIENT
Start: 2022-03-25 | End: 2022-07-22

## 2022-03-29 ENCOUNTER — TELEPHONE (OUTPATIENT)
Dept: OBGYN | Facility: CLINIC | Age: 27
End: 2022-03-29
Payer: MEDICAID

## 2022-03-29 NOTE — TELEPHONE ENCOUNTER
results from [facility] printed out today. AFP is abnormal and reviewed by Ashley GRIMM, she advised to fax results to Dr. Felipe's office since referral was already sent for abnormal US.  3/29/22@ 9:46am. N/a, msg left for patient to call back.  Patient called back. She was informed as above. She already has appt with Dr. Felipe on 4/15/22 @ 8:30 am. She was advised that going to see specialist will be next step anyway after abnormal AFP.  AFP results faxed to Dr. Felipe's office.  Patient called back and states her appt with Dr. Felipe was moved to 4/2/22.

## 2022-04-18 ENCOUNTER — ROUTINE PRENATAL (OUTPATIENT)
Dept: OBGYN | Facility: CLINIC | Age: 27
End: 2022-04-18
Payer: MEDICAID

## 2022-04-18 VITALS — SYSTOLIC BLOOD PRESSURE: 110 MMHG | BODY MASS INDEX: 23 KG/M2 | WEIGHT: 134 LBS | DIASTOLIC BLOOD PRESSURE: 54 MMHG

## 2022-04-18 DIAGNOSIS — O28.3 ABNORMAL FETAL ULTRASOUND: ICD-10-CM

## 2022-04-18 DIAGNOSIS — O09.92 SUPERVISION OF HIGH RISK PREGNANCY IN SECOND TRIMESTER: Primary | ICD-10-CM

## 2022-04-18 DIAGNOSIS — R77.2 ABNORMAL ALPHA FETOPROTEIN (AFP) LEVEL: ICD-10-CM

## 2022-04-18 PROCEDURE — 90040 PR PRENATAL FOLLOW UP: CPT

## 2022-04-18 ASSESSMENT — FIBROSIS 4 INDEX: FIB4 SCORE: 0.72

## 2022-04-18 NOTE — PROGRESS NOTES
OB follow up   + fetal movement.  No VB, LOF or UC's.  Phone # [phone]  Preferred pharmacy confirmed.  3rd trimester lab orders pended and instructions given to patient.  Dr Felipe seen 4/2/22. Next visit in 4 weeks

## 2022-04-18 NOTE — PROGRESS NOTES
S:  Ines here for routine prenatal follow up.  Reports good FM.  Denies VB, RUCs, LOF or vaginal DC.  Saw Dr. Felipe on , several markers for T21 including positive screen on AFP. Feels she is in a better mental space, does not want further evaluation of genetics and declines NIPT and amnio. Has follow up with Matteo in 4 weeks to reevaluate. Has gained some weight and feels that she is getting more food and healthier food.     O:    Vitals:    22 1520   BP: 110/54   Weight: 60.8 kg (134 lb)   Strong smell of marijuana in the room. Patient did not collect drug screen yet.   See flow sheet.    A:  IUP at 25w6d  S=D but on the smaller side  Diastasis recti  Patient Active Problem List    Diagnosis Date Noted   • Supervision of high-risk pregnancy 2022   • Abnormal fetal ultrasound 2022   • Hx of preeclampsia, prior pregnancy, currently pregnant 2016   • History of  delivery affecting pregnancy 2016   • Keloid scar 2016   • History of seizure 2016       P:  1. Questions answered.           2. Encouraged adequate water intake        3.   labor precautions reviewed.        4.  F/u 4 wks and PRN        5.  28wk labs ordered and instructions provided        6.  F/u with Dr. Felipe's office as instructed     Orders Placed This Encounter   • GLUCOSE 1HR GESTATIONAL   • CBC WITHOUT DIFFERENTIAL   • T.PALLIDUM AB EIA        Iram Salinas C.N.M.

## 2022-05-01 ENCOUNTER — HOSPITAL ENCOUNTER (EMERGENCY)
Facility: MEDICAL CENTER | Age: 27
End: 2022-05-01
Attending: OBSTETRICS & GYNECOLOGY | Admitting: OBSTETRICS & GYNECOLOGY
Payer: MEDICAID

## 2022-05-01 VITALS
BODY MASS INDEX: 22.88 KG/M2 | WEIGHT: 134 LBS | OXYGEN SATURATION: 98 % | HEART RATE: 86 BPM | TEMPERATURE: 98 F | SYSTOLIC BLOOD PRESSURE: 103 MMHG | DIASTOLIC BLOOD PRESSURE: 60 MMHG | RESPIRATION RATE: 16 BRPM | HEIGHT: 64 IN

## 2022-05-01 LAB
APPEARANCE UR: CLEAR
COLOR UR AUTO: YELLOW
GLUCOSE UR QL STRIP.AUTO: NEGATIVE MG/DL
KETONES UR QL STRIP.AUTO: NEGATIVE MG/DL
LEUKOCYTE ESTERASE UR QL STRIP.AUTO: NEGATIVE
NITRITE UR QL STRIP.AUTO: NEGATIVE
PH UR STRIP.AUTO: 8.5 [PH] (ref 5–8)
PROT UR QL STRIP: ABNORMAL MG/DL
RBC UR QL AUTO: NEGATIVE
SP GR UR STRIP.AUTO: 1.02 (ref 1–1.03)

## 2022-05-01 PROCEDURE — 81002 URINALYSIS NONAUTO W/O SCOPE: CPT

## 2022-05-01 PROCEDURE — 99282 EMERGENCY DEPT VISIT SF MDM: CPT

## 2022-05-01 PROCEDURE — 99242 OFF/OP CONSLTJ NEW/EST SF 20: CPT | Mod: MISDOCU | Performed by: OBSTETRICS & GYNECOLOGY

## 2022-05-01 PROCEDURE — 700111 HCHG RX REV CODE 636 W/ 250 OVERRIDE (IP): Performed by: OBSTETRICS & GYNECOLOGY

## 2022-05-01 PROCEDURE — 59025 FETAL NON-STRESS TEST: CPT | Mod: MISDOCU | Performed by: OBSTETRICS & GYNECOLOGY

## 2022-05-01 RX ORDER — ONDANSETRON 4 MG/1
4 TABLET, ORALLY DISINTEGRATING ORAL EVERY 4 HOURS PRN
Status: DISCONTINUED | OUTPATIENT
Start: 2022-05-01 | End: 2022-05-01 | Stop reason: HOSPADM

## 2022-05-01 RX ORDER — ONDANSETRON 2 MG/ML
4 INJECTION INTRAMUSCULAR; INTRAVENOUS EVERY 4 HOURS PRN
Status: DISCONTINUED | OUTPATIENT
Start: 2022-05-01 | End: 2022-05-01 | Stop reason: HOSPADM

## 2022-05-01 RX ORDER — SODIUM CHLORIDE, SODIUM LACTATE, POTASSIUM CHLORIDE, CALCIUM CHLORIDE 600; 310; 30; 20 MG/100ML; MG/100ML; MG/100ML; MG/100ML
INJECTION, SOLUTION INTRAVENOUS CONTINUOUS
Status: DISCONTINUED | OUTPATIENT
Start: 2022-05-01 | End: 2022-05-01 | Stop reason: HOSPADM

## 2022-05-01 RX ADMIN — ONDANSETRON 4 MG: 4 TABLET, ORALLY DISINTEGRATING ORAL at 13:00

## 2022-05-01 ASSESSMENT — FIBROSIS 4 INDEX: FIB4 SCORE: 0.72

## 2022-05-01 NOTE — ED PROVIDER NOTES
OB ED Note:    CC: vomiting    HPI:  Ms. Ines Friedman is a 26 y.o.  @ 27w5d by LMP consistent with 5 week ultrasound presenting for complaints of vomiting that started this morning. Patient reports that she commonly has morning sickness and takes Zofran at home with improvement in symptoms however she got to work this morning and vomited. She is still feeling nauseous but has not vomited again. She reports that she is feeling cramping described as menstrual cramps.     Contractions: feeling cramping  Loss of fluid: No   Vaginal bleeding: No   Fetal movement: present       PNC with [facility] and Dr. Felipe    PNL:  Rh+, RI, HIV neg, TrepAb neg, HBsAg NR, GC/CT neg/neg  Glucola: 70      ROS:  Const: denies fevers, general concerns  CV/resp: reports no concerns  GI: denies abd pain, GI concerns  : see HPI  Neuro: denies HA/vision changes    OB History    Para Term  AB Living   6 3 3 0 2 3   SAB IAB Ectopic Molar Multiple Live Births   1 0 0 0   3      # Outcome Date GA Lbr Serg/2nd Weight Sex Delivery Anes PTL Lv   6 Current            5 AB 21 12w0d    TAB      4 Term 18 39w1d  3.175 kg (7 lb) F  Spinal N RUTH      Birth Comments: Repeat C/S walk in pt.    3 SAB 17 6w0d    SAB         Birth Comments: Dilatation and evacuation of the uterus.   2 Term 16 39w0d  2.863 kg (6 lb 5 oz) F CS-LTranv Spinal N RUTH      Birth Comments: Repeat C/S    1 Term 11/19/15 37w5d  2.9 kg (6 lb 6.3 oz) M CS-LTranv Spinal N RUTH      Birth Comments: Primary C/S due preeclampsia      Complications: Pre-eclampsia      Obstetric Comments   Pregnancy unplanned but desired. FOB no involved. Pt works for [employer] for 12 hours.        No past medical history on file.    Past Surgical History:   Procedure Laterality Date   • REPEAT C SECTION  2018    Procedure: REPEAT C SECTION;  Surgeon: Jenni Worrell M.D.;  Location: LABOR AND DELIVERY;  Service: Labor and Delivery    • DILATION AND EVACUATION N/A 2017    Procedure: DILATION AND EVACUATION;  Surgeon: Pawan Brooks M.D.;  Location: SURGERY SAME DAY [facility];  Service:    • PRIMARY C SECTION  2015    Procedure: PRIMARY C SECTION;  Surgeon: Jenni Worrell M.D.;  Location: LABOR AND DELIVERY;  Service:        No current facility-administered medications on file prior to encounter.     No current outpatient medications on file prior to encounter.       Family History   Problem Relation Age of Onset   • No Known Problems Mother    • No Known Problems Father        Social History     Tobacco Use   • Smoking status: Light Tobacco Smoker   • Smokeless tobacco: Never Used   • Tobacco comment: 3 cigerettes a day   Substance Use Topics   • Alcohol use: No     Comment: socially   • Drug use: No         PE:  There were no vitals filed for this visit.  gen: AAO, NAD  abd: soft, gravid,   Ext: NT, no edema    FHT: 140/moderate variability/+ accels/ - decels  Ethan: uterine irritability     A/P: 26 y.o.  @ 27w5d by LMP consistent with 5 week ultrasound presenting for complaints of vomiting that started this morning. She has had nausea throughout pregnancy and takes Zofran at home which usually improves symptoms.  -Discussed IVF with patient- refused IVF at this time  -Zofran 4mg tab with improvement of symptoms  -discharge home  -return precautions including regular contractions, loss of fluid, vaginal bleeding, or decreased fetal movement discussed with patient       Candice Conde M.D.

## 2022-06-20 ENCOUNTER — HOSPITAL ENCOUNTER (EMERGENCY)
Facility: MEDICAL CENTER | Age: 27
End: 2022-06-20
Attending: OBSTETRICS & GYNECOLOGY | Admitting: OBSTETRICS & GYNECOLOGY
Payer: MEDICAID

## 2022-06-20 VITALS
RESPIRATION RATE: 20 BRPM | SYSTOLIC BLOOD PRESSURE: 111 MMHG | OXYGEN SATURATION: 89 % | TEMPERATURE: 96.6 F | DIASTOLIC BLOOD PRESSURE: 63 MMHG | HEART RATE: 79 BPM | BODY MASS INDEX: 22.88 KG/M2 | HEIGHT: 64 IN | WEIGHT: 134 LBS

## 2022-06-20 PROCEDURE — 59025 FETAL NON-STRESS TEST: CPT

## 2022-06-20 PROCEDURE — 302449 STATCHG TRIAGE ONLY (STATISTIC)

## 2022-06-20 PROCEDURE — A9270 NON-COVERED ITEM OR SERVICE: HCPCS | Performed by: NURSE PRACTITIONER

## 2022-06-20 PROCEDURE — 700102 HCHG RX REV CODE 250 W/ 637 OVERRIDE(OP): Performed by: NURSE PRACTITIONER

## 2022-06-20 RX ORDER — ACETAMINOPHEN 500 MG
1000 TABLET ORAL EVERY 6 HOURS PRN
Status: DISCONTINUED | OUTPATIENT
Start: 2022-06-20 | End: 2022-06-20 | Stop reason: HOSPADM

## 2022-06-20 RX ADMIN — ACETAMINOPHEN 1000 MG: 500 TABLET, FILM COATED ORAL at 06:32

## 2022-06-20 ASSESSMENT — FIBROSIS 4 INDEX: FIB4 SCORE: 0.72

## 2022-06-20 NOTE — DISCHARGE INSTRUCTIONS
General Instructions:  If you think you are in labor, time contractions (lying on your left side) from the beginning of one contraction to the beginning of the next contraction for at least one hour.  Increase fluid intake: you should consume 10-12 8 oz glasses of non-caffeinated fluid per day.  Report any pressure or burning on urination to your physician.  Monitor fetal movement: If you notice an absence or decrease in fetal movement, drink a large glass of water and rest on your side.  If there is no increase in movement, call your physician or go to the hospital for further evaluation.  Report any sudden, sharp abdominal pain.  Report any bleeding.  Spotting or pinkish discharge is normal after vaginal exam.  You may also spot after sexual intercourse.    Fetal Movement Counts  Patient Name: ________________________________________________ Patient Due Date: ____________________  What is a fetal movement count?    A fetal movement count is the number of times that you feel your baby move during a certain amount of time. This may also be called a fetal kick count. A fetal movement count is recommended for every pregnant woman. You may be asked to start counting fetal movements as early as week 28 of your pregnancy.  Pay attention to when your baby is most active. You may notice your baby's sleep and wake cycles. You may also notice things that make your baby move more. You should do a fetal movement count:  When your baby is normally most active.  At the same time each day.  A good time to count movements is while you are resting, after having something to eat and drink.  How do I count fetal movements?  Find a quiet, comfortable area. Sit, or lie down on your side.  Write down the date, the start time and stop time, and the number of movements that you felt between those two times. Take this information with you to your health care visits.  For 2 hours, count kicks, flutters, swishes, rolls, and jabs. You should  feel at least 10 movements during 2 hours.  You may stop counting after you have felt 10 movements.  If you do not feel 10 movements in 2 hours, have something to eat and drink. Then, keep resting and counting for 1 hour. If you feel at least 4 movements during that hour, you may stop counting.  Contact a health care provider if:  You feel fewer than 4 movements in 2 hours.  Your baby is not moving like he or she usually does.  Date: ____________ Start time: ____________ Stop time: ____________ Movements: ____________  Date: ____________ Start time: ____________ Stop time: ____________ Movements: ____________  Date: ____________ Start time: ____________ Stop time: ____________ Movements: ____________  Date: ____________ Start time: ____________ Stop time: ____________ Movements: ____________  Date: ____________ Start time: ____________ Stop time: ____________ Movements: ____________  Date: ____________ Start time: ____________ Stop time: ____________ Movements: ____________  Date: ____________ Start time: ____________ Stop time: ____________ Movements: ____________  Date: ____________ Start time: ____________ Stop time: ____________ Movements: ____________  Date: ____________ Start time: ____________ Stop time: ____________ Movements: ____________  This information is not intended to replace advice given to you by your health care provider. Make sure you discuss any questions you have with your health care provider.  Document Released: 01/17/2008 Document Revised: 01/07/2020 Document Reviewed: 01/26/2017  Elsevier Patient Education © 2020 [employer]  Labor Instructions (37 - 39 weeks):  Call your physician or return to hospital if:  You have regular contractions that get progressively closer, longer and stronger.  Your water breaks (remember time and color).  You have bleeding like a period.  Your baby does not move enough to complete the daily kick counts (10 movements in 2 hours)  Your baby moves much less  often than on the days before or you have not felt your baby move all day.    Pre-term Labor (<37 weeks):  Call your physician or return to the hospital if:  You have painless regular contractions more than 4 in one hour.  Your water breaks (remember time and color).  You have menstrual-like cramps, a low dull backache or pressure in your pelvis or back.  Your baby does not move enough to complete the daily kick count (10 movements in 2 hours).  Your baby moves much less often than on the days before or you have not felt your baby move all day.  Please review the MEDICATION LIST section of your AFTER VISIT SUMMARY document.  Take your medication as prescribed    Other Instructions:  Please carefully review your entire AFTER VISIT SUMMARY document for all discharge instructions.

## 2022-06-20 NOTE — PROGRESS NOTES
Pt  here at 34w6d via [employer]. She was at work today, bent over and suddenly had a sharp pain in RLQ. It remained persistent and worse with movement. She is unsure what ctx feel like but doesn't believe she has had any. Pt reports normal fetal movement and denies LOF, bleeding, HA, VC or RUQ pain. Has not tried anything for the pain and is working on her hydration. She experiences occasional nausea and was given Zofran from [facility]    AIDEN Mojica notified via TC. Verbal orders for 1000 mg Tylenol. Discussed round ligament pain with pt    Pt reports some improvement in pain. AIDEN Mojica notified via TC. Verbal discharge orders received following reactive NST. Encouraged hydration and comfort measures. She's instructed to return with worsening pain, consistent ctx, decreased FM, bleeding or LOF. Pt in agreement to plan. Report given to WHIT Manzo

## 2022-06-20 NOTE — PROGRESS NOTES
REport received from Kath. Pt okay to d/c home. General disharge instructions, PTL precautions, kick counts and follow up discussed. Pt requested a work note and cab voucher. Work note provided and asked pt to follow up in office if requesting time off from work. Disposable warm packs provided and educated on how to use. Social work called about cab voucher and UBER was provided for pt. Pt escorted to ER and  notified of pt waiting for ride.

## 2022-07-20 ENCOUNTER — ANESTHESIA (OUTPATIENT)
Dept: OBGYN | Facility: MEDICAL CENTER | Age: 27
End: 2022-07-20
Payer: MEDICAID

## 2022-07-20 ENCOUNTER — HOSPITAL ENCOUNTER (INPATIENT)
Facility: MEDICAL CENTER | Age: 27
LOS: 2 days | End: 2022-07-22
Attending: OBSTETRICS & GYNECOLOGY | Admitting: OBSTETRICS & GYNECOLOGY
Payer: MEDICAID

## 2022-07-20 ENCOUNTER — ANESTHESIA EVENT (OUTPATIENT)
Dept: OBGYN | Facility: MEDICAL CENTER | Age: 27
End: 2022-07-20
Payer: MEDICAID

## 2022-07-20 DIAGNOSIS — G89.18 POST-OP PAIN: ICD-10-CM

## 2022-07-20 PROBLEM — Z34.90 INTRAUTERINE PREGNANCY: Status: ACTIVE | Noted: 2022-07-20

## 2022-07-20 PROBLEM — F12.90 MARIJUANA USE: Status: ACTIVE | Noted: 2022-07-20

## 2022-07-20 LAB
ABO GROUP BLD: NORMAL
AMPHET UR QL SCN: NEGATIVE
BARBITURATES UR QL SCN: NEGATIVE
BENZODIAZ UR QL SCN: NEGATIVE
BLD GP AB SCN SERPL QL: NORMAL
BZE UR QL SCN: NEGATIVE
CANNABINOIDS UR QL SCN: NEGATIVE
ERYTHROCYTE [DISTWIDTH] IN BLOOD BY AUTOMATED COUNT: 44.2 FL (ref 35.9–50)
ERYTHROCYTE [DISTWIDTH] IN BLOOD BY AUTOMATED COUNT: 44.5 FL (ref 35.9–50)
HCT VFR BLD AUTO: 27.1 % (ref 37–47)
HCT VFR BLD AUTO: 31.4 % (ref 37–47)
HGB BLD-MCNC: 10.2 G/DL (ref 12–16)
HGB BLD-MCNC: 8.9 G/DL (ref 12–16)
MCH RBC QN AUTO: 27.3 PG (ref 27–33)
MCH RBC QN AUTO: 27.6 PG (ref 27–33)
MCHC RBC AUTO-ENTMCNC: 32.5 G/DL (ref 33.6–35)
MCHC RBC AUTO-ENTMCNC: 32.8 G/DL (ref 33.6–35)
MCV RBC AUTO: 83.1 FL (ref 81.4–97.8)
MCV RBC AUTO: 85.1 FL (ref 81.4–97.8)
METHADONE UR QL SCN: NEGATIVE
OPIATES UR QL SCN: NEGATIVE
OXYCODONE UR QL SCN: NEGATIVE
PCP UR QL SCN: NEGATIVE
PLATELET # BLD AUTO: 119 K/UL (ref 164–446)
PLATELET # BLD AUTO: 146 K/UL (ref 164–446)
PMV BLD AUTO: 10.7 FL (ref 9–12.9)
PMV BLD AUTO: 11.3 FL (ref 9–12.9)
PROPOXYPH UR QL SCN: NEGATIVE
RBC # BLD AUTO: 3.26 M/UL (ref 4.2–5.4)
RBC # BLD AUTO: 3.69 M/UL (ref 4.2–5.4)
RH BLD: NORMAL
WBC # BLD AUTO: 5.9 K/UL (ref 4.8–10.8)
WBC # BLD AUTO: 6.9 K/UL (ref 4.8–10.8)

## 2022-07-20 PROCEDURE — 86850 RBC ANTIBODY SCREEN: CPT

## 2022-07-20 PROCEDURE — 99282 EMERGENCY DEPT VISIT SF MDM: CPT

## 2022-07-20 PROCEDURE — 700105 HCHG RX REV CODE 258: Performed by: INTERNAL MEDICINE

## 2022-07-20 PROCEDURE — 99140 ANES COMP EMERGENCY COND: CPT | Performed by: INTERNAL MEDICINE

## 2022-07-20 PROCEDURE — C1755 CATHETER, INTRASPINAL: HCPCS | Performed by: OBSTETRICS & GYNECOLOGY

## 2022-07-20 PROCEDURE — 160041 HCHG SURGERY MINUTES - EA ADDL 1 MIN LEVEL 4: Performed by: OBSTETRICS & GYNECOLOGY

## 2022-07-20 PROCEDURE — 160029 HCHG SURGERY MINUTES - 1ST 30 MINS LEVEL 4: Performed by: OBSTETRICS & GYNECOLOGY

## 2022-07-20 PROCEDURE — 160035 HCHG PACU - 1ST 60 MINS PHASE I: Performed by: OBSTETRICS & GYNECOLOGY

## 2022-07-20 PROCEDURE — 700102 HCHG RX REV CODE 250 W/ 637 OVERRIDE(OP): Performed by: INTERNAL MEDICINE

## 2022-07-20 PROCEDURE — 86900 BLOOD TYPING SEROLOGIC ABO: CPT

## 2022-07-20 PROCEDURE — 700111 HCHG RX REV CODE 636 W/ 250 OVERRIDE (IP): Performed by: NURSE PRACTITIONER

## 2022-07-20 PROCEDURE — 36415 COLL VENOUS BLD VENIPUNCTURE: CPT

## 2022-07-20 PROCEDURE — 700101 HCHG RX REV CODE 250: Performed by: INTERNAL MEDICINE

## 2022-07-20 PROCEDURE — A9270 NON-COVERED ITEM OR SERVICE: HCPCS | Performed by: INTERNAL MEDICINE

## 2022-07-20 PROCEDURE — 770002 HCHG ROOM/CARE - OB PRIVATE (112)

## 2022-07-20 PROCEDURE — 80307 DRUG TEST PRSMV CHEM ANLYZR: CPT

## 2022-07-20 PROCEDURE — 01961 ANES CESAREAN DELIVERY ONLY: CPT | Performed by: INTERNAL MEDICINE

## 2022-07-20 PROCEDURE — 700111 HCHG RX REV CODE 636 W/ 250 OVERRIDE (IP): Performed by: INTERNAL MEDICINE

## 2022-07-20 PROCEDURE — 59515 CESAREAN DELIVERY: CPT | Performed by: OBSTETRICS & GYNECOLOGY

## 2022-07-20 PROCEDURE — 160002 HCHG RECOVERY MINUTES (STAT): Performed by: OBSTETRICS & GYNECOLOGY

## 2022-07-20 PROCEDURE — 160009 HCHG ANES TIME/MIN: Performed by: OBSTETRICS & GYNECOLOGY

## 2022-07-20 PROCEDURE — 85027 COMPLETE CBC AUTOMATED: CPT

## 2022-07-20 PROCEDURE — 86901 BLOOD TYPING SEROLOGIC RH(D): CPT

## 2022-07-20 PROCEDURE — 160048 HCHG OR STATISTICAL LEVEL 1-5: Performed by: OBSTETRICS & GYNECOLOGY

## 2022-07-20 RX ORDER — HYDROMORPHONE HYDROCHLORIDE 1 MG/ML
0.4 INJECTION, SOLUTION INTRAMUSCULAR; INTRAVENOUS; SUBCUTANEOUS
Status: ACTIVE | OUTPATIENT
Start: 2022-07-20 | End: 2022-07-21

## 2022-07-20 RX ORDER — KETOROLAC TROMETHAMINE 30 MG/ML
INJECTION, SOLUTION INTRAMUSCULAR; INTRAVENOUS PRN
Status: DISCONTINUED | OUTPATIENT
Start: 2022-07-20 | End: 2022-07-20 | Stop reason: SURG

## 2022-07-20 RX ORDER — HYDROMORPHONE HYDROCHLORIDE 1 MG/ML
0.1 INJECTION, SOLUTION INTRAMUSCULAR; INTRAVENOUS; SUBCUTANEOUS
Status: DISCONTINUED | OUTPATIENT
Start: 2022-07-20 | End: 2022-07-20 | Stop reason: HOSPADM

## 2022-07-20 RX ORDER — DIPHENHYDRAMINE HYDROCHLORIDE 50 MG/ML
25 INJECTION INTRAMUSCULAR; INTRAVENOUS EVERY 6 HOURS PRN
Status: DISCONTINUED | OUTPATIENT
Start: 2022-07-21 | End: 2022-07-21

## 2022-07-20 RX ORDER — SODIUM CHLORIDE, SODIUM LACTATE, POTASSIUM CHLORIDE, CALCIUM CHLORIDE 600; 310; 30; 20 MG/100ML; MG/100ML; MG/100ML; MG/100ML
INJECTION, SOLUTION INTRAVENOUS ONCE
Status: ACTIVE | OUTPATIENT
Start: 2022-07-20 | End: 2022-07-21

## 2022-07-20 RX ORDER — ONDANSETRON 2 MG/ML
4 INJECTION INTRAMUSCULAR; INTRAVENOUS EVERY 6 HOURS PRN
Status: ACTIVE | OUTPATIENT
Start: 2022-07-20 | End: 2022-07-21

## 2022-07-20 RX ORDER — MEPERIDINE HYDROCHLORIDE 25 MG/ML
12.5 INJECTION INTRAMUSCULAR; INTRAVENOUS; SUBCUTANEOUS
Status: DISCONTINUED | OUTPATIENT
Start: 2022-07-20 | End: 2022-07-20 | Stop reason: HOSPADM

## 2022-07-20 RX ORDER — KETOROLAC TROMETHAMINE 30 MG/ML
30 INJECTION, SOLUTION INTRAMUSCULAR; INTRAVENOUS EVERY 6 HOURS
Status: DISPENSED | OUTPATIENT
Start: 2022-07-20 | End: 2022-07-21

## 2022-07-20 RX ORDER — ONDANSETRON 2 MG/ML
INJECTION INTRAMUSCULAR; INTRAVENOUS PRN
Status: DISCONTINUED | OUTPATIENT
Start: 2022-07-20 | End: 2022-07-20 | Stop reason: SURG

## 2022-07-20 RX ORDER — DIPHENHYDRAMINE HYDROCHLORIDE 50 MG/ML
12.5 INJECTION INTRAMUSCULAR; INTRAVENOUS EVERY 6 HOURS PRN
Status: ACTIVE | OUTPATIENT
Start: 2022-07-20 | End: 2022-07-21

## 2022-07-20 RX ORDER — HYDROMORPHONE HYDROCHLORIDE 1 MG/ML
0.4 INJECTION, SOLUTION INTRAMUSCULAR; INTRAVENOUS; SUBCUTANEOUS
Status: DISCONTINUED | OUTPATIENT
Start: 2022-07-20 | End: 2022-07-20 | Stop reason: HOSPADM

## 2022-07-20 RX ORDER — ACETAMINOPHEN 500 MG
1000 TABLET ORAL EVERY 6 HOURS
Status: DISCONTINUED | OUTPATIENT
Start: 2022-07-21 | End: 2022-07-22 | Stop reason: HOSPADM

## 2022-07-20 RX ORDER — DIPHENHYDRAMINE HCL 25 MG
25 TABLET ORAL EVERY 6 HOURS PRN
Status: DISCONTINUED | OUTPATIENT
Start: 2022-07-21 | End: 2022-07-21

## 2022-07-20 RX ORDER — OXYTOCIN 10 [USP'U]/ML
10 INJECTION, SOLUTION INTRAMUSCULAR; INTRAVENOUS
Status: DISCONTINUED | OUTPATIENT
Start: 2022-07-20 | End: 2022-07-22 | Stop reason: HOSPADM

## 2022-07-20 RX ORDER — HYDRALAZINE HYDROCHLORIDE 20 MG/ML
5 INJECTION INTRAMUSCULAR; INTRAVENOUS
Status: DISCONTINUED | OUTPATIENT
Start: 2022-07-20 | End: 2022-07-20 | Stop reason: HOSPADM

## 2022-07-20 RX ORDER — DIPHENHYDRAMINE HYDROCHLORIDE 50 MG/ML
12.5 INJECTION INTRAMUSCULAR; INTRAVENOUS
Status: DISCONTINUED | OUTPATIENT
Start: 2022-07-20 | End: 2022-07-20 | Stop reason: HOSPADM

## 2022-07-20 RX ORDER — IBUPROFEN 800 MG/1
800 TABLET ORAL EVERY 8 HOURS PRN
Status: DISCONTINUED | OUTPATIENT
Start: 2022-07-24 | End: 2022-07-22 | Stop reason: HOSPADM

## 2022-07-20 RX ORDER — OXYCODONE HYDROCHLORIDE 10 MG/1
10 TABLET ORAL EVERY 4 HOURS PRN
Status: DISCONTINUED | OUTPATIENT
Start: 2022-07-21 | End: 2022-07-22 | Stop reason: HOSPADM

## 2022-07-20 RX ORDER — OXYCODONE HCL 5 MG/5 ML
5 SOLUTION, ORAL ORAL
Status: DISCONTINUED | OUTPATIENT
Start: 2022-07-20 | End: 2022-07-20 | Stop reason: HOSPADM

## 2022-07-20 RX ORDER — OXYCODONE HYDROCHLORIDE 5 MG/1
5 TABLET ORAL EVERY 4 HOURS PRN
Status: DISCONTINUED | OUTPATIENT
Start: 2022-07-21 | End: 2022-07-22 | Stop reason: HOSPADM

## 2022-07-20 RX ORDER — SODIUM CHLORIDE, SODIUM LACTATE, POTASSIUM CHLORIDE, CALCIUM CHLORIDE 600; 310; 30; 20 MG/100ML; MG/100ML; MG/100ML; MG/100ML
INJECTION, SOLUTION INTRAVENOUS CONTINUOUS
Status: DISCONTINUED | OUTPATIENT
Start: 2022-07-20 | End: 2022-07-22 | Stop reason: HOSPADM

## 2022-07-20 RX ORDER — DIPHENHYDRAMINE HYDROCHLORIDE 50 MG/ML
25 INJECTION INTRAMUSCULAR; INTRAVENOUS EVERY 6 HOURS PRN
Status: DISPENSED | OUTPATIENT
Start: 2022-07-20 | End: 2022-07-21

## 2022-07-20 RX ORDER — ONDANSETRON 2 MG/ML
4 INJECTION INTRAMUSCULAR; INTRAVENOUS EVERY 6 HOURS PRN
Status: DISCONTINUED | OUTPATIENT
Start: 2022-07-21 | End: 2022-07-22 | Stop reason: HOSPADM

## 2022-07-20 RX ORDER — HYDROMORPHONE HYDROCHLORIDE 1 MG/ML
0.2 INJECTION, SOLUTION INTRAMUSCULAR; INTRAVENOUS; SUBCUTANEOUS
Status: DISCONTINUED | OUTPATIENT
Start: 2022-07-20 | End: 2022-07-20 | Stop reason: HOSPADM

## 2022-07-20 RX ORDER — ONDANSETRON 2 MG/ML
4 INJECTION INTRAMUSCULAR; INTRAVENOUS
Status: DISCONTINUED | OUTPATIENT
Start: 2022-07-20 | End: 2022-07-20 | Stop reason: HOSPADM

## 2022-07-20 RX ORDER — CITRIC ACID/SODIUM CITRATE 334-500MG
30 SOLUTION, ORAL ORAL ONCE
Status: COMPLETED | OUTPATIENT
Start: 2022-07-20 | End: 2022-07-20

## 2022-07-20 RX ORDER — ACETAMINOPHEN 500 MG
1000 TABLET ORAL EVERY 6 HOURS PRN
Status: DISCONTINUED | OUTPATIENT
Start: 2022-07-24 | End: 2022-07-22 | Stop reason: HOSPADM

## 2022-07-20 RX ORDER — DEXAMETHASONE SODIUM PHOSPHATE 4 MG/ML
INJECTION, SOLUTION INTRA-ARTICULAR; INTRALESIONAL; INTRAMUSCULAR; INTRAVENOUS; SOFT TISSUE PRN
Status: DISCONTINUED | OUTPATIENT
Start: 2022-07-20 | End: 2022-07-20 | Stop reason: SURG

## 2022-07-20 RX ORDER — SODIUM CHLORIDE, SODIUM GLUCONATE, SODIUM ACETATE, POTASSIUM CHLORIDE AND MAGNESIUM CHLORIDE 526; 502; 368; 37; 30 MG/100ML; MG/100ML; MG/100ML; MG/100ML; MG/100ML
INJECTION, SOLUTION INTRAVENOUS
Status: DISCONTINUED | OUTPATIENT
Start: 2022-07-20 | End: 2022-07-20 | Stop reason: SURG

## 2022-07-20 RX ORDER — DOCUSATE SODIUM 100 MG/1
100 CAPSULE, LIQUID FILLED ORAL 2 TIMES DAILY PRN
Status: DISCONTINUED | OUTPATIENT
Start: 2022-07-20 | End: 2022-07-22 | Stop reason: HOSPADM

## 2022-07-20 RX ORDER — OXYTOCIN 10 [USP'U]/ML
INJECTION, SOLUTION INTRAMUSCULAR; INTRAVENOUS PRN
Status: DISCONTINUED | OUTPATIENT
Start: 2022-07-20 | End: 2022-07-20 | Stop reason: SURG

## 2022-07-20 RX ORDER — ONDANSETRON 4 MG/1
4 TABLET, ORALLY DISINTEGRATING ORAL EVERY 6 HOURS PRN
Status: DISCONTINUED | OUTPATIENT
Start: 2022-07-21 | End: 2022-07-22 | Stop reason: HOSPADM

## 2022-07-20 RX ORDER — HYDROMORPHONE HYDROCHLORIDE 1 MG/ML
0.2 INJECTION, SOLUTION INTRAMUSCULAR; INTRAVENOUS; SUBCUTANEOUS
Status: ACTIVE | OUTPATIENT
Start: 2022-07-20 | End: 2022-07-21

## 2022-07-20 RX ORDER — CEFAZOLIN SODIUM 1 G/3ML
2 INJECTION, POWDER, FOR SOLUTION INTRAMUSCULAR; INTRAVENOUS ONCE
Status: COMPLETED | OUTPATIENT
Start: 2022-07-20 | End: 2022-07-20

## 2022-07-20 RX ORDER — HALOPERIDOL 5 MG/ML
1 INJECTION INTRAMUSCULAR
Status: DISCONTINUED | OUTPATIENT
Start: 2022-07-20 | End: 2022-07-20 | Stop reason: HOSPADM

## 2022-07-20 RX ORDER — OXYCODONE HYDROCHLORIDE 5 MG/1
5 TABLET ORAL EVERY 4 HOURS PRN
Status: ACTIVE | OUTPATIENT
Start: 2022-07-20 | End: 2022-07-21

## 2022-07-20 RX ORDER — SODIUM CHLORIDE, SODIUM LACTATE, POTASSIUM CHLORIDE, CALCIUM CHLORIDE 600; 310; 30; 20 MG/100ML; MG/100ML; MG/100ML; MG/100ML
INJECTION, SOLUTION INTRAVENOUS PRN
Status: DISCONTINUED | OUTPATIENT
Start: 2022-07-20 | End: 2022-07-22 | Stop reason: HOSPADM

## 2022-07-20 RX ORDER — OXYCODONE HCL 5 MG/5 ML
10 SOLUTION, ORAL ORAL
Status: DISCONTINUED | OUTPATIENT
Start: 2022-07-20 | End: 2022-07-20 | Stop reason: HOSPADM

## 2022-07-20 RX ORDER — MEDROXYPROGESTERONE ACETATE 150 MG/ML
150 INJECTION, SUSPENSION INTRAMUSCULAR ONCE
Status: COMPLETED | OUTPATIENT
Start: 2022-07-21 | End: 2022-07-21

## 2022-07-20 RX ORDER — MORPHINE SULFATE 0.5 MG/ML
INJECTION, SOLUTION EPIDURAL; INTRATHECAL; INTRAVENOUS
Status: COMPLETED | OUTPATIENT
Start: 2022-07-20 | End: 2022-07-20

## 2022-07-20 RX ORDER — ACETAMINOPHEN 500 MG
1000 TABLET ORAL EVERY 6 HOURS
Status: COMPLETED | OUTPATIENT
Start: 2022-07-20 | End: 2022-07-21

## 2022-07-20 RX ORDER — BUPIVACAINE HYDROCHLORIDE 7.5 MG/ML
INJECTION, SOLUTION INTRASPINAL
Status: COMPLETED | OUTPATIENT
Start: 2022-07-20 | End: 2022-07-20

## 2022-07-20 RX ORDER — METOCLOPRAMIDE HYDROCHLORIDE 5 MG/ML
10 INJECTION INTRAMUSCULAR; INTRAVENOUS ONCE
Status: COMPLETED | OUTPATIENT
Start: 2022-07-20 | End: 2022-07-20

## 2022-07-20 RX ORDER — SODIUM CHLORIDE, SODIUM GLUCONATE, SODIUM ACETATE, POTASSIUM CHLORIDE AND MAGNESIUM CHLORIDE 526; 502; 368; 37; 30 MG/100ML; MG/100ML; MG/100ML; MG/100ML; MG/100ML
1500 INJECTION, SOLUTION INTRAVENOUS ONCE
Status: COMPLETED | OUTPATIENT
Start: 2022-07-20 | End: 2022-07-20

## 2022-07-20 RX ORDER — ALBUTEROL SULFATE 2.5 MG/3ML
2.5 SOLUTION RESPIRATORY (INHALATION)
Status: DISCONTINUED | OUTPATIENT
Start: 2022-07-20 | End: 2022-07-20 | Stop reason: HOSPADM

## 2022-07-20 RX ORDER — LABETALOL HYDROCHLORIDE 5 MG/ML
5 INJECTION, SOLUTION INTRAVENOUS
Status: DISCONTINUED | OUTPATIENT
Start: 2022-07-20 | End: 2022-07-20 | Stop reason: HOSPADM

## 2022-07-20 RX ORDER — IBUPROFEN 800 MG/1
800 TABLET ORAL EVERY 8 HOURS
Status: DISCONTINUED | OUTPATIENT
Start: 2022-07-21 | End: 2022-07-22 | Stop reason: HOSPADM

## 2022-07-20 RX ORDER — OXYCODONE HYDROCHLORIDE 10 MG/1
10 TABLET ORAL EVERY 4 HOURS PRN
Status: ACTIVE | OUTPATIENT
Start: 2022-07-20 | End: 2022-07-21

## 2022-07-20 RX ADMIN — FENTANYL CITRATE 15 MCG: 50 INJECTION, SOLUTION INTRAMUSCULAR; INTRAVENOUS at 05:49

## 2022-07-20 RX ADMIN — DEXAMETHASONE SODIUM PHOSPHATE 8 MG: 4 INJECTION, SOLUTION INTRA-ARTICULAR; INTRALESIONAL; INTRAMUSCULAR; INTRAVENOUS; SOFT TISSUE at 05:55

## 2022-07-20 RX ADMIN — OXYTOCIN 20 UNITS: 10 INJECTION, SOLUTION INTRAMUSCULAR; INTRAVENOUS at 06:11

## 2022-07-20 RX ADMIN — SODIUM CHLORIDE, SODIUM GLUCONATE, SODIUM ACETATE, POTASSIUM CHLORIDE AND MAGNESIUM CHLORIDE: 526; 502; 368; 37; 30 INJECTION, SOLUTION INTRAVENOUS at 05:50

## 2022-07-20 RX ADMIN — CEFAZOLIN 2 G: 330 INJECTION, POWDER, FOR SOLUTION INTRAMUSCULAR; INTRAVENOUS at 05:55

## 2022-07-20 RX ADMIN — ACETAMINOPHEN 1000 MG: 500 TABLET ORAL at 09:12

## 2022-07-20 RX ADMIN — SODIUM CHLORIDE, SODIUM GLUCONATE, SODIUM ACETATE, POTASSIUM CHLORIDE AND MAGNESIUM CHLORIDE 1500 ML: 526; 502; 368; 37; 30 INJECTION, SOLUTION INTRAVENOUS at 05:02

## 2022-07-20 RX ADMIN — ACETAMINOPHEN 1000 MG: 500 TABLET ORAL at 20:25

## 2022-07-20 RX ADMIN — METOCLOPRAMIDE 10 MG: 5 INJECTION, SOLUTION INTRAMUSCULAR; INTRAVENOUS at 05:28

## 2022-07-20 RX ADMIN — KETOROLAC TROMETHAMINE 30 MG: 30 INJECTION, SOLUTION INTRAMUSCULAR; INTRAVENOUS at 17:50

## 2022-07-20 RX ADMIN — DIPHENHYDRAMINE HYDROCHLORIDE 25 MG: 50 INJECTION INTRAMUSCULAR; INTRAVENOUS at 21:31

## 2022-07-20 RX ADMIN — SODIUM CITRATE AND CITRIC ACID MONOHYDRATE 30 ML: 500; 334 SOLUTION ORAL at 05:28

## 2022-07-20 RX ADMIN — PHENYLEPHRINE HYDROCHLORIDE 40 MCG/MIN: 10 INJECTION INTRAVENOUS at 05:49

## 2022-07-20 RX ADMIN — SODIUM CHLORIDE, SODIUM GLUCONATE, SODIUM ACETATE, POTASSIUM CHLORIDE AND MAGNESIUM CHLORIDE: 526; 502; 368; 37; 30 INJECTION, SOLUTION INTRAVENOUS at 05:44

## 2022-07-20 RX ADMIN — ACETAMINOPHEN 1000 MG: 500 TABLET ORAL at 14:12

## 2022-07-20 RX ADMIN — KETOROLAC TROMETHAMINE 15 MG: 30 INJECTION, SOLUTION INTRAMUSCULAR at 06:27

## 2022-07-20 RX ADMIN — ONDANSETRON 4 MG: 2 INJECTION INTRAMUSCULAR; INTRAVENOUS at 05:45

## 2022-07-20 RX ADMIN — BUPIVACAINE HYDROCHLORIDE IN DEXTROSE 1.5 ML: 7.5 INJECTION, SOLUTION SUBARACHNOID at 05:49

## 2022-07-20 RX ADMIN — MORPHINE SULFATE 150 MCG: 0.5 INJECTION, SOLUTION EPIDURAL; INTRATHECAL; INTRAVENOUS at 05:49

## 2022-07-20 RX ADMIN — KETOROLAC TROMETHAMINE 30 MG: 30 INJECTION, SOLUTION INTRAMUSCULAR; INTRAVENOUS at 13:13

## 2022-07-20 RX ADMIN — FAMOTIDINE 20 MG: 10 INJECTION, SOLUTION INTRAVENOUS at 05:28

## 2022-07-20 RX ADMIN — OXYTOCIN 1000 ML: 10 INJECTION, SOLUTION INTRAMUSCULAR; INTRAVENOUS at 06:31

## 2022-07-20 ASSESSMENT — EDINBURGH POSTNATAL DEPRESSION SCALE (EPDS)
THE THOUGHT OF HARMING MYSELF HAS OCCURRED TO ME: NEVER
I HAVE FELT SCARED OR PANICKY FOR NO GOOD REASON: NO, NOT AT ALL
I HAVE FELT SAD OR MISERABLE: NO, NOT AT ALL
I HAVE BEEN ABLE TO LAUGH AND SEE THE FUNNY SIDE OF THINGS: AS MUCH AS I ALWAYS COULD
I HAVE BEEN ANXIOUS OR WORRIED FOR NO GOOD REASON: NO, NOT AT ALL
I HAVE BLAMED MYSELF UNNECESSARILY WHEN THINGS WENT WRONG: NO, NEVER
I HAVE LOOKED FORWARD WITH ENJOYMENT TO THINGS: AS MUCH AS I EVER DID
I HAVE BEEN SO UNHAPPY THAT I HAVE BEEN CRYING: NO, NEVER
THINGS HAVE BEEN GETTING ON TOP OF ME: NO, I HAVE BEEN COPING AS WELL AS EVER
I HAVE BEEN SO UNHAPPY THAT I HAVE HAD DIFFICULTY SLEEPING: NOT VERY OFTEN

## 2022-07-20 ASSESSMENT — LIFESTYLE VARIABLES
ALCOHOL_USE: NO
EVER_SMOKED: NEVER

## 2022-07-20 ASSESSMENT — PAIN DESCRIPTION - PAIN TYPE
TYPE: ACUTE PAIN
TYPE: SURGICAL PAIN
TYPE: ACUTE PAIN

## 2022-07-20 ASSESSMENT — ENCOUNTER SYMPTOMS
RESPIRATORY NEGATIVE: 1
NAUSEA: 1
NEUROLOGICAL NEGATIVE: 1
EYES NEGATIVE: 1
CONSTITUTIONAL NEGATIVE: 1
BACK PAIN: 1
PSYCHIATRIC NEGATIVE: 1
CARDIOVASCULAR NEGATIVE: 1

## 2022-07-20 ASSESSMENT — FIBROSIS 4 INDEX: FIB4 SCORE: 0.72

## 2022-07-20 ASSESSMENT — PAIN SCALES - GENERAL: PAIN_LEVEL: 1

## 2022-07-20 NOTE — ED PROVIDER NOTES
"Emergency Obstetric Consultation     Date of Service  2022    Reason for Consultation  Chief Complaint   Patient presents with   • Contractions     See RN note       History of Presenting Illness  26 y.o. female who presented 2022 with contractions.  She states she started yuliana earlier this evening along with lower back pain, and decided to walk to see if it helped.  She reports it made her vomit and then her contractions got stronger. Since she arrived she reports her contractions have settled but still having lower back pain and pelvic pressure.   She has had 3 c/s in the past, the first for preeclmapsia and arrest of dilation at 6cm, followed by elective for the two others.     She denies LOF or VB and endorses good FM.    She received some PNC early in pregnancy but not seen in clinic since 25 weeks.  She is currently working at [employer].  She denies any use of marijuana, drugs or alcohol. She did have mandatory stay at [facility] \"to get her kids back\" due to marijuana use.    There are accounts in her chart history dating back to  of pt having hallucinations and possibly having schizophrenia/bipolar d/o.  Pt denies all of this and states it is her twin sister who has this.  She reports she has never had mental health issues.  She does admit to taking some oral melatonin to try to help her sleep earlier tonight, otherwise denies medication use.    She last ate food at 2200.    Review of Systems  Review of Systems   Constitutional: Negative.    HENT: Negative.    Eyes: Negative.    Respiratory: Negative.    Cardiovascular: Negative.    Gastrointestinal: Positive for nausea.   Genitourinary: Negative.    Musculoskeletal: Positive for back pain.   Skin: Negative.    Neurological: Negative.    Endo/Heme/Allergies: Negative.    Psychiatric/Behavioral: Negative.    All other systems reviewed and are negative.      Obstetric History    OB History    Para Term  AB Living   6 3 3 " 0 2 3   SAB IAB Ectopic Molar Multiple Live Births   1 0 0 0   3      # Outcome Date GA Lbr Serg/2nd Weight Sex Delivery Anes PTL Lv   6 Current            5 AB 04/01/21 12w0d    TAB      4 Term 06/21/18 39w1d  3.175 kg (7 lb) F  Spinal N RUTH      Birth Comments: Repeat C/S walk in pt.    3 SAB 06/17/17 6w0d    SAB         Birth Comments: Dilatation and evacuation of the uterus.   2 Term 12/11/16 39w0d  2.863 kg (6 lb 5 oz) F CS-LTranv Spinal N RUTH      Birth Comments: Repeat C/S    1 Term 11/19/15 37w5d  2.9 kg (6 lb 6.3 oz) M CS-LTranv Spinal N RUTH      Birth Comments: Primary C/S due preeclampsia      Complications: Pre-eclampsia      Obstetric Comments   Pregnancy unplanned but desired. FOB no involved. Pt works for [employer] for 12 hours.        Gynecologic History  Patient's last menstrual period was 10/19/2021.    Medical History  No past medical history on file.    Surgical History   has a past surgical history that includes repeat c section (6/21/2018); dilation and evacuation (N/A, 6/13/2017); and primary c section (11/19/2015).    Family History  family history includes No Known Problems in her father and mother.    Social History   reports that she has been smoking. She has never used smokeless tobacco. She reports that she does not drink alcohol and does not use drugs.    Medications  Medications Prior to Admission   Medication Sig Dispense Refill Last Dose   • ondansetron (ZOFRAN ODT) 4 MG TABLET DISPERSIBLE Take 1 Tablet by mouth every 6 hours as needed for Nausea. 10 Tablet 1    • famotidine (PEPCID) 20 MG Tab Take 1 Tablet by mouth every evening. (Patient not taking: Reported on 4/18/2022) 30 Tablet 1    • ondansetron (ZOFRAN) 4 MG Tab tablet Take 1 Tablet by mouth every four hours as needed for Nausea/Vomiting. (Patient not taking: Reported on 4/18/2022) 20 Tablet 2    • terconazole (TERAZOL 7) Cream Insert 1 Applicator into the vagina at bedtime. (Patient not taking: No sig reported) 45 g  0    • Prenatal Vit-Fe Fumarate-FA (PRENATAL VITAMINS PO) Take 1 Tablet by mouth 1 time a day as needed.          Allergies  No Known Allergies    Physical Exam  Maternal Exam:  Uterine Assessment: Contraction strength is mild.  Abdomen: Patient reports no abdominal tenderness. Surgical scars: low transverse.    Introitus: Normal vulva. Normal vagina.  Ferning test: not done.   Nitrazine test: not done.  Amniotic fluid character: not assessed.    Cervix: Cervix evaluated by digital exam.    Mode: ultrasound.    Baseline rate: 155.   Variability: moderate (6-25 bpm).      Fetal State Assessment: Category II - tracings are indeterminate.      Eyes:      Extraocular Movements: Extraocular movements intact.      Conjunctiva/sclera: Conjunctivae normal.      Pupils: Pupils are equal, round, and reactive to light.   Skin:     General: Skin is warm and dry.      Capillary Refill: Capillary refill takes less than 2 seconds.   HENT:      Head: Normocephalic and atraumatic.      Right Ear: External ear normal.      Left Ear: External ear normal.      Nose: Nose normal.   Cardiovascular:      Rate and Rhythm: Tachycardia present.   Musculoskeletal:         General: Normal range of motion.      Cervical back: Normal range of motion.   Abdominal:      Palpations: Abdomen is soft.      Tenderness: There is no abdominal tenderness. There is no right CVA tenderness or left CVA tenderness.   Constitutional:       Appearance: She is normal weight.   Pulmonary:      Effort: Pulmonary effort is normal.   Psychiatric:         Mood and Affect: Mood normal.         Behavior: Behavior normal.         Thought Content: Thought content normal.         Judgment: Judgment normal.   Neurological:      General: No focal deficit present.      Mental Status: She is alert and oriented to person, place, and time. Mental status is at baseline.   Genitourinary:     General: Normal vulva.         Laboratory               No results for input(s):  NTPROBNP in the last 72 hours.         Imaging  4/2/22 Oki, EIF and foramen ovale aneurysm  3/7/22 bilateral CPC, EIF, increased bowel echogenicity, small pericardial effusion    Assessment & Plan  Assessment:  Not in labor.   Membrane status: intact.   Fetal well-being: indeterminate.   Hx of csection x 3  Limited PNC    Plan:  Admit  Dr Pardo aware and will plan for c/s in am as not NPO until 2200            NORBERT Guerrero.

## 2022-07-20 NOTE — PROGRESS NOTES
Late Entry    0515-Pt care assumed.  0544-In OR.  0610-Delivery of viable female, 8/9 apgars.  0643-In PACU.  0750-Pt transferred to PP unit after uncomplicated surgery and recovery.

## 2022-07-20 NOTE — ANESTHESIA TIME REPORT
Anesthesia Start and Stop Event Times     Date Time Event    7/20/2022 0515 Ready for Procedure     0544 Anesthesia Start     0646 Anesthesia Stop        Responsible Staff  07/20/22    Name Role Begin End    Carlos Steward M.D. Anesth 0544 0646        Overtime Reason:  no overtime (within assigned shift)    Comments:

## 2022-07-20 NOTE — ANESTHESIA PREPROCEDURE EVALUATION
" Case: [id number] Date/Time: 2226    Procedure:  SECTION, REPEAT (Abdomen)    Anesthesia type: Spinal    Pre-op diagnosis: repeat in labor    Location: [facility] OR  / SURGERY LABOR AND DELIVERY    Surgeons: Susy Pardo M.D.      Patient presents for repeat . Presented to [facility] with contractions, previous  necessitating urgent C Section.      Anesthesia: No problems with Anesthesia. Reports last C section with epidural, \"panicked and had to get medicines to sleep\".   Resp: No COPD history. +marijuana use in pregnancy.   Cards: No pre-eclampsia this pregnancy, +hx of pre eclampsia in past, or known cardiac abnormalities.  Heme: No known bleeding disorders, platelets reviewed.    Risks of procedure discussed including: infection, bleeding, nerve damage, and post-dural puncture headache.     Relevant Problems   NEURO   (positive) History of seizure   (positive) Hx of preeclampsia, prior pregnancy, currently pregnant      OB   (positive) History of  delivery affecting pregnancy      Other   (positive) Marijuana use   (positive) Supervision of high-risk pregnancy       Physical Exam    Airway   Mallampati: II  TM distance: >3 FB  Neck ROM: full       Cardiovascular - normal exam  Rhythm: regular  Rate: normal  (-) murmur     Dental - normal exam           Pulmonary - normal exam  Breath sounds clear to auscultation     Abdominal    Neurological - normal exam                 Anesthesia Plan    ASA 2- EMERGENT (with contractions)   ASA physical status emergent criteria: non-reassuring fetal heart tones    Plan - spinal   Neuraxial block will be primary anesthetic                Postoperative Plan: Postoperative administration of opioids is intended.    Pertinent diagnostic labs and testing reviewed    Informed Consent:    Anesthetic plan and risks discussed with patient.        "

## 2022-07-20 NOTE — ANESTHESIA POSTPROCEDURE EVALUATION
Patient: Ines Friedman    Procedure Summary     Date: 22 Room / Location: [facility] OR 01 / SURGERY LABOR AND DELIVERY    Anesthesia Start: 544 Anesthesia Stop: 646    Procedure:  SECTION, REPEAT (Abdomen) Diagnosis: (repeat in labor)    Surgeons: Susy Pardo M.D. Responsible Provider: Carlos Steward M.D.    Anesthesia Type: spinal ASA Status: 2 - Emergent          Final Anesthesia Type: spinal  Last vitals  BP   Blood Pressure: 97/70   Temp   37.2 °C (98.9 °F)    Pulse   95   Resp   15   SpO2   99 %      Anesthesia Post Evaluation    Patient location during evaluation: PACU  Patient participation: complete - patient participated  Level of consciousness: awake and alert  Pain score: 1    Airway patency: patent  Anesthetic complications: no  Cardiovascular status: hemodynamically stable  Respiratory status: acceptable  Hydration status: euvolemic    PONV: none    patient able to participate, but full recovery from regional anesthesia has not occurred and is not expected within the stipulated timeframe for the completion of the evaluation      No complications documented.

## 2022-07-20 NOTE — DISCHARGE PLANNING
Discharge Planning Assessment Post Partum    Reason for Referral: Limited prenatal care, history of THC, bipolar, and schizophrenia  Address: [address] [address]  Phone: [phone]  Mom Diagnosis: Pregnancy,   Baby Diagnosis: [place]-39.1 weeks  Primary Language: English    Name of Baby: Sona Friedman (: 22)  Father of the Baby: Not involved  Involved in baby’s care? Unsure-planning on getting a paternity test     Prenatal Care: Yes, limited care.  Stopped after 25 weeks.  Discussed with mother who stated she was planning on moving out of state but ended up staying here instead  Mom's PCP: No PCP  PCP for new baby: DEVEN Huynh    Support System: MOB's twin sister: Nunu Reed  Coping/Bonding between mother & baby: Yes  Source of Feeding: breast feeding  Supplies for Infant: prepared for infant; denies any needs    Mom's Insurance: Medicaid-HPN  Baby Covered on Insurance:Yes  Mother Employed/School: [employer]  Other children in the home/names & ages: three children: 6 year old son, 5 year ol daughter, and 4 year old daughter that was adopted    Financial Hardship/Income: No   Mom's Mental status: alert and oriented  Services used prior to admit: Medicaid     CPS History: No  Psychiatric History: discussed history of bipolar and schizophrenia.  MOB denies diagnosis and states that her twin sister-Nunu has bipolar and schizophrenia which was confirmed by Nunu who was in the room.  MOB denies having any type of psychiatric history.  Domestic Violence History: No  Drug/ETOH History: history of THC.  MOB stated she stopped when she became pregnant.  MOB's UDS is negative and infant's UDS is pending    Resources Provided: children and family resource list, post partum support and counseling, and a list of WIC clinics provided to mother  Referrals Made: diaper bank referral provided     Clearance for Discharge: Infant's UDS is pending.  If negative,  infant is cleared to discharge home with mother once medically cleared

## 2022-07-20 NOTE — OP REPORT
Date: 2022    PREOPERATIVE DIAGNOSIS:  1.IUP @ 39w1d  2. H/o CS x 3  3. Contractions    POSTOPERATIVE DIAGNOSIS:  1. IUP @ 39w1d  2. H/o CS x 3  3. Contractions  4. Very thin lower uterine tissue    PROCEDURE: Repeat Low transverse      SURGEON: Susy Pardo MD    ASSISTANT: Katty Cox MD    ANESTHESIA: Carlos Steward M.D.     ESTIMATED BLOOD LOSS: 600 cc    IV FLUIDS: 3000 cc    URINE OUTPUT: 100 cc    COMPLICATIONS: None    FINDINGS: Viable girl infant in vtx presentation, weighing 3040 gm, Apgars 8/9, Very thin lower uterine segment uterus, nml tubes, and ovaries noted.     INDICATION: This is a 26 y.o. yo  at 39w1d who presents to L&D for contractions at term. Pt was getting PNC at the [facility] but hasn't been seen since 25 weeks. She was ctx but found to be closed long and high. She had eaten around 10 am so decision made to proceed with CS after 8 hours. Pt declines BTL.    PROCEDURE NOTE: The patient was taken to the operating room where epidural anesthesia was found to be adequate. The patient was prepped and draped in the usual sterile fashion in the dorsal supine position with a left-bauer tilt. A Pfannenstiel skin incision was made with the scalpel and carried through to the underlying layer of fascia. The fascia was incised in the midline and extended laterally using Edmondson scissors. Kocher clamps were used to elevate the superior aspect of the fascial incision and the underlying rectus muscles were dissected off bluntly and using Edmondson scissors. Attention was then turned to the inferior aspect of the fascial incision, which in similar fashion was grasped with Kocher clamps, elevated, and the underlying rectus muscles were dissected off bluntly and using Edmondson scissors. The rectus muscles were dissected in the midline bluntly.    The peritoneum was bluntly dissected, entered, and extended superiorly and inferiorly with good visualization of the bladder. The Blas  retractor was inserted for greater visualization. The lower uterine segment was incised in a transverse fashion using the scalpel and extended using manual traction. Clear fluid was noted. The infant was delivered atraumatically.  Delayed cord clamping was completed. The cord was then clamped and cut. The infant was then handed to the nurse. The placenta was removed manually, found to be intact with a 3-vessel cord. The uterus was cleared of all clots and debris. The lower uterine segment was found to be very thin, almost see through. The uterine incision was repaired in single layers using 0 vicryl suture. The lower uterine segment was so thin that as we were repairing the hysterotomy, the muscle would pull through. We went back and reapproxiated all musculature. Hemostasis was visualized. The Blas retractor was removed from the abdomen.     The uterine incision was reexamined and was noted to be hemostatic. The fascia was closed with 0 Vicryl in a continue fashion. The subcutaneous layer was closed with 2-0 vicryl and the skin was closed with 4-0 monocryl. Sponge, lap, and instrument counts were correct x2. The patient tolerated the procedure well and was transferred to the recovery room in stable condition. Given very thin lower uterine segment and possible for uterine rupture, pt counseled she should consider no further pregnancies. If pregnancy does occur would consider repeat CS earlier than 39 weeks.

## 2022-07-20 NOTE — PROGRESS NOTES
0800- pt to floor and oriented to room and policies. POC discussed including 0700- report received from NOC RN. POC discussed including feeding intervals, I+O documentation, latch support, lochia changes, ambulation, infant temperature management including STS and layering, weights, VS intervals, and discharge planning. Medications and other comfort measures discussed. Call light in reach. Reports infant latched for 30 minutes downstairs and has no concerns. Reports she  all her previous children with no difficulties. Infant bagged, discussed diaper changes and lab collection. Fundus firm, lochia light. Pitocin running per mar. Diet ordered. Kathy NV. Infant brought to mother for STS.

## 2022-07-20 NOTE — ANESTHESIA PROCEDURE NOTES
Spinal Block    Date/Time: 7/20/2022 5:49 AM  Performed by: Carlos Steward M.D.  Authorized by: Carlos Steward M.D.     Start Time:  7/20/2022 5:49 AM  Reason for Block: primary anesthetic    patient identified, IV checked, site marked, risks and benefits discussed, surgical consent, monitors and equipment checked, pre-op evaluation and timeout performed    Patient Position:  Sitting  Prep: ChloraPrep, patient draped and sterile technique    Monitoring:  Blood pressure, continuous pulse oximetry and heart rate  Approach:  Midline  Location:  L3-4  Injection Technique:  Single-shot  Skin infiltration:  Lidocaine  Strength:  1%  Dose:  3ml  Needle Type:  Pencan  Needle Gauge:  25 G  CSF flowing pre/post injection:  Yes  Sensory Level:  T4

## 2022-07-20 NOTE — CARE PLAN
The patient is Stable - Low risk of patient condition declining or worsening    Shift Goals  Clinical Goals: lochia WDL  Patient Goals: pain WDL  Family Goals: bond    Progress made toward(s) clinical / shift goals:    Problem: Altered Physiologic Condition  Goal: Patient physiologically stable as evidenced by normal lochia, palpable uterine involution and vitals within normal limits  Outcome: Progressing     Problem: Respiratory/Oxygenation Function Post-Surgical  Goal: Patient will achieve/maintain normal respiratory rate/effort  Outcome: Progressing       Patient is not progressing towards the following goals:

## 2022-07-20 NOTE — H&P
"Obstetrics & Gynecology History & Physical Note    Date of Service  2022    Chief Complaint  Contractions and lower back pain    History of Presenting Illness  Ines Friedman is a 26 y.o.  at 39w1d 2022, by Last Menstrual Period. Patient's last menstrual period was 10/19/2021. and confirmed by 14 week US.  She is being admitted for .    She states she started yuliana earlier this evening along with lower back pain, and decided to walk to see if it helped.  She reports it made her vomit and then her contractions got stronger. Since she arrived she reports her contractions have settled but still having lower back pain and pelvic pressure.   She has had 3 c/s in the past, the first for preeclmapsia and arrest of dilation at 6cm, followed by elective for the two others.     She denies LOF or VB and endorses good FM.    She received some PNC early in pregnancy but not seen in clinic since 25 weeks.  She is currently working at [employer].  She denies any use of marijuana, drugs or alcohol. She did have mandatory stay at [facility] \"to get her kids back\" due to marijuana use.    There are accounts in her chart history dating back to  of pt having hallucinations and possibly having schizophrenia/bipolar d/o.  Pt denies all of this and states it is her twin sister who has this.  She reports she has never had mental health issues.  She does admit to taking some oral melatonin to try to help her sleep earlier tonight, otherwise denies medication use.    She last ate food at 2200.      Prenatal care is at [facility] and is complicated by:  1. Marijuana use     Patient Active Problem List    Diagnosis Date Noted   • Abnormal alpha fetoprotein (AFP) level 2022   • Supervision of high-risk pregnancy 2022   • Abnormal fetal ultrasound 2022   • Hx of preeclampsia, prior pregnancy, currently pregnant 2016   • History of  delivery affecting pregnancy 2016   • Keloid " scar 2016   • History of seizure 2016       Obstetric History  OB History    Para Term  AB Living   6 3 3 0 2 3   SAB IAB Ectopic Molar Multiple Live Births   1 0 0 0   3      # Outcome Date GA Lbr Serg/2nd Weight Sex Delivery Anes PTL Lv   6 Current            5 AB 21 12w0d    TAB      4 Term 18 39w1d  3.175 kg (7 lb) F  Spinal N RUTH      Birth Comments: Repeat C/S walk in pt.    3 SAB 17 6w0d    SAB         Birth Comments: Dilatation and evacuation of the uterus.   2 Term 16 39w0d  2.863 kg (6 lb 5 oz) F CS-LTranv Spinal N RUTH      Birth Comments: Repeat C/S    1 Term 11/19/15 37w5d  2.9 kg (6 lb 6.3 oz) M CS-LTranv Spinal N RUTH      Birth Comments: Primary C/S due preeclampsia      Complications: Pre-eclampsia      Obstetric Comments   Pregnancy unplanned but desired. FOB no involved. Pt works for [employer] for 12 hours.        Gynecologic History  Hx +chlamydia in past, negative     Review of Systems  Review of Systems   Constitutional: Negative.    HENT: Negative.    Eyes: Negative.    Respiratory: Negative.    Cardiovascular: Negative.    Gastrointestinal: Positive for nausea.   Genitourinary: Negative.    Musculoskeletal: Positive for back pain.   Skin: Negative.    Neurological: Negative.    Endo/Heme/Allergies: Negative.    Psychiatric/Behavioral: Negative.    All other systems reviewed and are negative.      Medical History   has no past medical history on file.    Surgical History   has a past surgical history that includes repeat c section (2018); dilation and evacuation (N/A, 2017); and primary c section (2015).     Family History  family history includes No Known Problems in her father and mother.     Social History   reports that she has been smoking. She has never used smokeless tobacco. She reports that she does not drink alcohol and does not use drugs.    Allergies  No Known Allergies    Medications  Prior to Admission  "Medications   Prescriptions Last Dose Informant Patient Reported? Taking?   Prenatal Vit-Fe Fumarate-FA (PRENATAL VITAMINS PO)   Yes No   Sig: Take 1 Tablet by mouth 1 time a day as needed.   famotidine (PEPCID) 20 MG Tab   No No   Sig: Take 1 Tablet by mouth every evening.   Patient not taking: Reported on 4/18/2022   ondansetron (ZOFRAN ODT) 4 MG TABLET DISPERSIBLE   No No   Sig: Take 1 Tablet by mouth every 6 hours as needed for Nausea.   ondansetron (ZOFRAN) 4 MG Tab tablet   No No   Sig: Take 1 Tablet by mouth every four hours as needed for Nausea/Vomiting.   Patient not taking: Reported on 4/18/2022   terconazole (TERAZOL 7) Cream   No No   Sig: Insert 1 Applicator into the vagina at bedtime.   Patient not taking: No sig reported      Facility-Administered Medications: None       Physical Exam  Vitals:    07/20/22 0218 07/20/22 0219   BP:  104/61   Pulse:  (!) 113   Resp:  20   Temp:  37.2 °C (98.9 °F)   TempSrc:  Temporal   SpO2:  99%   Weight: 63.5 kg (140 lb)    Height: 1.626 m (5' 4\")        General:   alert, cooperative, no distress   Skin:   normal   HEENT:  PERRLA and extraocular movements intact   Lungs:   clear to auscultation bilaterally   Heart:   regular rate and rhythm, chest is clear without rales or wheezing, no pedal edema   Breasts:   deferred   Abdomen:  Abdomen soft, non-tender; gravid.   Pelvis: EFW 6lbs   FHT:  155 BPM Fetal heart variability: moderate  Fetal Heart Rate decelerations: variable  Fetal Heart Rate accelerations: yes   [place]:  every 10 minutes, mild   Presentations:    Cervix:     Dilation: Closed    Effacement: 50    Station:  -3    Consistency:      Position:         Laboratory:  Prenatal Results     General (Most Recent Result)     Test Value Reference Range Date Time    ABO  O   02/12/19 1005    Rh  POS   02/12/19 1005    Antibody screen  NEG   06/07/18 2233    HbA1c        Chlamydia by PCR  Negative  Negative 11/04/17 0800    Gonorrhea by PCR  Negative  Negative 11/04/17 " 0800    RPR/Syphilus  Non Reactive  Non Reactive 06/07/18 2233    HSV 1/2 by PCR (non-serum)        HSV 1/2 (serum)        HSV 1        HSV 2        HPV (16)        HBsAg  Negative  Negative 06/07/18 2233    HIV-1 HIV-2 Antibodies  Non Reactive  Non Reactive 06/07/18 2233    Rubella  26.80 IU/mL  06/07/18 2233    Tb              Pap Smear (Most Recent Result)     Test Value Reference Range Date Time    Pap smear        Pap smear w/HPV        Pap smear w/CTNG        Pap smar w/HPV CTNG        Pap smear (reflex HPV ACUS)        Pap smear (reflex HPV ASCUS w/CTNG)   (See Report)    01/26/22     Pathology gyn specimen              Urinalysis (Most Recent Result)     Test Value Reference Range Date Time    Urinalysis   Abnormal    (See Report)    09/20/18 2034    POC urinalysis   (See Report)    01/24/22 0855    Urine drug screen (w/ conf)        Urine culture ([id number])   (See Report)    09/20/18 2034    Urine Protein/Creatinine Ratio              Urinalysis, Culture if indicated     Test Value Reference Range Date Time    Color  Yellow   11/21/21 1948    Appearance  Clear   11/21/21 1948    Specific Gravity  1.028  <1.035 11/21/21 1948    PH  7.5  5.0 - 8.0 11/21/21 1948    Glucose  Negative mg/dL Negative 11/21/21 1948    Ketones  Trace mg/dL Negative 11/21/21 1948    Protein  Negative mg/dL Negative 11/21/21 1948    Bilirubin  Negative  Negative 11/21/21 1948    Nitrites  Negative  Negative 11/21/21 1948    Leukocytes Esterase  Trace  Negative 11/21/21 1948    Blood  Negative  Negative 11/21/21 1948    Comment  Microscopic   11/21/21 1948    Culture  Yes UA Culture  02/19/18 1025          Urine Drug Screen     Test Value Reference Range Date Time    Amphetamines  Negative  Negative 06/08/18 0035    Barbiturates  Negative  Negative 06/08/18 0035    Benzodiazepines  Negative  Negative 06/08/18 0035    Cocaine  Negative  Negative 06/08/18 0035    Methadone  Negative  Negative 06/08/18 0035    Opiates  Negative   Negative 18 003    Oxycodone  Negative  Negative 18 0035    Phencylidine  Negative  Negative 18 0035    Propoxyphene  Negative  Negative 18 003    Marijuana Metabolite  Positive  Negative 18 0035          1st Trimester     Test Value Reference Range Date Time    Hgb  12.4 g/dL 12.0 - 16.0 21 2138    Hct  37.6 % 37.0 - 47.0 21 2138    Fasting Glucose Tolerance        GTT, 1 hour        GTT, 2 hours        GTT, 3 hours              2nd Trimester     Test Value Reference Range Date Time    Hgb        Hct        AST        ALT        Uric Acid        Fasting Glucose Tolerance        GTT, 1 hour        GTT, 2 hours        GTT, 3 hours              3rd Trimester     Test Value Reference Range Date Time    Hgb        Hct        Platelet count        GBS (PELAYO BROTH)        Fasting Glucose Tolerance        GTT, 1 hour        GTT, 2 hous        GTT, 3hours              Congenital Disease Screening     Test Value Reference Range Date Time    First Trimester Screen        Quad Screen        BH Electrophoresis   (See Report)    05/26/15 1523    Cystic Fibrosis Carrier Study        SMA        AFP Maternal Serum         AFP Tetra   (See Report)    16 1006    NIPT              Legend    ^: Historical                          Urinalysis:    No results found     Imagin22 Oki, EIF and foramen ovale aneurysm  3/7/22 bilateral CPC, EIF, increased bowel echogenicity, small pericardial effusion         Assessment:  26 y.o.  39w1d who presents with contractions, not in labor  Hx of abnormal AFP followed by US showing EIF and foramen ovale aneurysm  Hx of c/s x 3  Hx of pre-eclampsia   Category 2 tracing on arrival, now category 1  Limited PNC  GBS unknown  +marijuana UDS 2022      Plan:  No new Assessment & Plan notes have been filed under this hospital service since the last note was generated.  Service: Obstetrics & Gynecology    1. Admit to L&D  2. Will plan c/s for  0930 to allow for 8 hr NPO status  3. UDS    VTE prophylaxis: ambulatory currently    NORBERT Guerrero.

## 2022-07-20 NOTE — PROGRESS NOTES
"0143: 39.1 weeks gestation  pt presents with c/o severe lower abdominal pain that comes and goes. Pt denies LOF, VB and endorses positive fetal movement. Fetal and maternal tachycardia noted. Pt denies taking any abusive substances tonight. Pt states that the only thing she has taken is two 3 mg tabs of Melatonin as she is stressed and has had a \"bad week\". EFMx2 applied. This RN attempts to perform SVE, pt not tolerating well. RN stops exam.     0227: SBAR w/CNM Essex updating on patient presentation and complaints. Provider to assess and get back to with RN.    0305: CNM Clement at bedside assessing pt. SVE cl/50/-3.    0414: CNM Clement at bedside, pt to be admitted for C/S today. See FHRT and pt record for remainder of care.    0500: Report given to WHIT Ledbetter.      "

## 2022-07-21 PROCEDURE — A9270 NON-COVERED ITEM OR SERVICE: HCPCS | Performed by: INTERNAL MEDICINE

## 2022-07-21 PROCEDURE — 700102 HCHG RX REV CODE 250 W/ 637 OVERRIDE(OP): Performed by: OBSTETRICS & GYNECOLOGY

## 2022-07-21 PROCEDURE — A9270 NON-COVERED ITEM OR SERVICE: HCPCS

## 2022-07-21 PROCEDURE — 51798 US URINE CAPACITY MEASURE: CPT

## 2022-07-21 PROCEDURE — 700111 HCHG RX REV CODE 636 W/ 250 OVERRIDE (IP): Performed by: OBSTETRICS & GYNECOLOGY

## 2022-07-21 PROCEDURE — 770002 HCHG ROOM/CARE - OB PRIVATE (112)

## 2022-07-21 PROCEDURE — 700102 HCHG RX REV CODE 250 W/ 637 OVERRIDE(OP): Performed by: INTERNAL MEDICINE

## 2022-07-21 PROCEDURE — A9270 NON-COVERED ITEM OR SERVICE: HCPCS | Performed by: OBSTETRICS & GYNECOLOGY

## 2022-07-21 PROCEDURE — 700102 HCHG RX REV CODE 250 W/ 637 OVERRIDE(OP)

## 2022-07-21 RX ORDER — DIPHENHYDRAMINE HCL 25 MG
25 TABLET ORAL EVERY 6 HOURS PRN
Status: DISCONTINUED | OUTPATIENT
Start: 2022-07-21 | End: 2022-07-22 | Stop reason: HOSPADM

## 2022-07-21 RX ORDER — DIPHENHYDRAMINE HYDROCHLORIDE 50 MG/ML
25 INJECTION INTRAMUSCULAR; INTRAVENOUS EVERY 6 HOURS PRN
Status: DISCONTINUED | OUTPATIENT
Start: 2022-07-21 | End: 2022-07-22 | Stop reason: HOSPADM

## 2022-07-21 RX ADMIN — IBUPROFEN 800 MG: 800 TABLET, FILM COATED ORAL at 22:32

## 2022-07-21 RX ADMIN — ACETAMINOPHEN 1000 MG: 500 TABLET, FILM COATED ORAL at 13:31

## 2022-07-21 RX ADMIN — DIPHENHYDRAMINE HYDROCHLORIDE 25 MG: 25 TABLET ORAL at 01:26

## 2022-07-21 RX ADMIN — ACETAMINOPHEN 1000 MG: 500 TABLET, FILM COATED ORAL at 21:08

## 2022-07-21 RX ADMIN — MEDROXYPROGESTERONE ACETATE 150 MG: 150 INJECTION, SUSPENSION, EXTENDED RELEASE INTRAMUSCULAR at 22:32

## 2022-07-21 RX ADMIN — ACETAMINOPHEN 1000 MG: 500 TABLET ORAL at 07:47

## 2022-07-21 RX ADMIN — IBUPROFEN 800 MG: 800 TABLET, FILM COATED ORAL at 13:32

## 2022-07-21 ASSESSMENT — PAIN DESCRIPTION - PAIN TYPE
TYPE: ACUTE PAIN

## 2022-07-21 NOTE — CARE PLAN
The patient is Stable - Low risk of patient condition declining or worsening    Shift Goals  Clinical Goals: lochia WDL  Patient Goals: pain wdl  Family Goals: bond    Progress made toward(s) clinical / shift goals:    Problem: Psychosocial - Postpartum  Goal: Patient will verbalize and demonstrate effective bonding and parenting behavior  Outcome: Progressing     Problem: Infection - Postpartum  Goal: Postpartum patient will be free of signs and symptoms of infection  Outcome: Progressing       Patient is not progressing towards the following goals:

## 2022-07-21 NOTE — PROGRESS NOTES
Received report and assumed care at shift change.    Pt assessed. Pt reports no concerns. Abdominal incision CDI. Fundus is firm, lochia is scant.Pt needs to void by 11 pm, Pt is passing gas. Pt ambulates independently in the room without any concerns.    MOB bands verified matching baby, cuddles blinking, Needs are met at this time. This RN educated pt and family in regards to POC.

## 2022-07-21 NOTE — PROGRESS NOTES
0700- report received from NOC RN. POC discussed including feeding intervals, I+O documentation, latch support, lochia changes, ambulation, infant temperature management including STS and layering, weights, VS intervals, and discharge planning. Medications and other comfort measures discussed. Call light in reach. Fundus firm, lochia light. mepilex silver in place, intact with small old drainage on right side.

## 2022-07-21 NOTE — CARE PLAN
Problem: Knowledge Deficit - Postpartum  Goal: Patient will verbalize and demonstrate understanding of self and infant care  Outcome: Progressing     Problem: Psychosocial - Postpartum  Goal: Patient will verbalize and demonstrate effective bonding and parenting behavior  Outcome: Progressing     Problem: Altered Physiologic Condition  Goal: Patient physiologically stable as evidenced by normal lochia, palpable uterine involution and vitals within normal limits  Outcome: Progressing   The patient is Stable - Low risk of patient condition declining or worsening    Shift Goals  Clinical Goals: stable VS and lochia WNL, pain control  Patient Goals: rest and feed baby  Family Goals:  (no family at b [place]side)    Progress made toward(s) clinical / shift goals:   Pt reports comfort after pain interventions, incision CDI, Fundus firm and lochia light, VSS, educated on POC, needs met at this time. Questions answered. Will continue to educate.

## 2022-07-21 NOTE — PROGRESS NOTES
Post Partum Progress Note    Name:   Ines Friedman   Date/Time:  2022 - 7:14 AM  Chief Admitting Dx:  Intrauterine pregnancy [Z34.90]  Indication for care in labor or delivery [O75.9]  Delivery Type:   for repeat  Post-Op/Post Partum Days #:  1    Subjective:  Abdominal pain: no  Ambulating:   yes  Tolerating liquids:  yes  Tolerating food:  yes common adult  Flatus:   Yes, occasionally  BM:    no  Bleeding:   with a small amount of bleeding  Voiding:   yes  Dizziness:   no  Feeding:   breast    Vitals:    22 2200 22 2300 22 0000 22 0200   BP: 105/68   (!) 98/78   Pulse:  80 77 78   Resp:  17    Temp:    36.1 °C (97 °F)   TempSrc:    Temporal   SpO2:  98% 100% 100%   Weight:       Height:           Exam:  Breast: Tenderness no, Engorged no and Lactating yes  Abdomen: Abdomen soft, non-tender. BS normal. No masses,  No organomegaly  Fundal Tenderness:  no  Fundus Firm: yes  Incision: dry and intact, occlusive dressing in place, no e/o periwound complications  Below umbilicus: yes  Perineum: perineum intact  Lochia: mild  Extremities: Normal, no edema extremities, peripheral pulses and reflexes normal    Meds:  Current Facility-Administered Medications   Medication Dose   • diphenhydrAMINE (BENADRYL) tablet/capsule 25 mg  25 mg    Or   • diphenhydrAMINE (BENADRYL) injection 25 mg  25 mg   • lactated ringers (LR) infusion     • oxytocin (PITOCIN) infusion (for postpartum)  125 mL/hr   • oxytocin (PITOCIN) injection 10 Units  10 Units   • acetaminophen (TYLENOL) tablet 1,000 mg  1,000 mg   • ketorolac (TORADOL) injection 30 mg  30 mg   • oxyCODONE immediate-release (ROXICODONE) tablet 5 mg  5 mg   • oxyCODONE immediate release (ROXICODONE) tablet 10 mg  10 mg   • HYDROmorphone (Dilaudid) injection 0.2 mg  0.2 mg   • HYDROmorphone (Dilaudid) injection 0.4 mg  0.4 mg   • ePHEDrine injection 10 mg  10 mg   • ondansetron (ZOFRAN) syringe/vial injection 4 mg  4 mg   •  diphenhydrAMINE (BENADRYL) injection 12.5 mg  12.5 mg   • diphenhydrAMINE (BENADRYL) injection 12.5 mg  12.5 mg    Or   • diphenhydrAMINE (BENADRYL) injection 25 mg  25 mg    Or   • naloxone HCl (NARCAN) 20 mg in  mL infusion  0.4 mg/hr   • lactated ringers infusion     • ibuprofen (MOTRIN) tablet 800 mg  800 mg    Followed by   • [START ON 2022] ibuprofen (MOTRIN) tablet 800 mg  800 mg   • acetaminophen (TYLENOL) tablet 1,000 mg  1,000 mg    Followed by   • [START ON 2022] acetaminophen (TYLENOL) tablet 1,000 mg  1,000 mg   • oxyCODONE immediate-release (ROXICODONE) tablet 5 mg  5 mg   • oxyCODONE immediate release (ROXICODONE) tablet 10 mg  10 mg   • ondansetron (ZOFRAN) syringe/vial injection 4 mg  4 mg    Or   • ondansetron (ZOFRAN ODT) dispertab 4 mg  4 mg   • docusate sodium (COLACE) capsule 100 mg  100 mg   • measles, mumps and rubella vaccine (MMR) injection 0.5 mL  0.5 mL   • medroxyPROGESTERone (DEPO-PROVERA) injection 150 mg  150 mg       Labs:   Recent Labs     22  0430 22   WBC 5.9 6.9   RBC 3.26* 3.69*   HEMOGLOBIN 8.9* 10.2*   HEMATOCRIT 27.1* 31.4*   MCV 83.1 85.1   MCH 27.3 27.6   MCHC 32.8* 32.5*   RDW 44.2 44.5   PLATELETCT 119* 146*   MPV 11.3 10.7       Assessment:  Chief Admitting Dx:  Intrauterine pregnancy [Z34.90]  Indication for care in labor or delivery [O75.9]  Delivery Type:   for repeat  Tubal Ligation:  no    Plan:  Continue routine post partum care.  Lactation support  Plan discharge home on PPD#3    Iram Salinas C.N.M.

## 2022-07-22 ENCOUNTER — PHARMACY VISIT (OUTPATIENT)
Dept: PHARMACY | Facility: MEDICAL CENTER | Age: 27
End: 2022-07-22
Payer: COMMERCIAL

## 2022-07-22 VITALS
WEIGHT: 140 LBS | HEART RATE: 64 BPM | DIASTOLIC BLOOD PRESSURE: 73 MMHG | HEIGHT: 64 IN | SYSTOLIC BLOOD PRESSURE: 103 MMHG | OXYGEN SATURATION: 99 % | TEMPERATURE: 98.6 F | RESPIRATION RATE: 16 BRPM | BODY MASS INDEX: 23.9 KG/M2

## 2022-07-22 PROCEDURE — RXMED WILLOW AMBULATORY MEDICATION CHARGE: Performed by: OBSTETRICS & GYNECOLOGY

## 2022-07-22 PROCEDURE — 700102 HCHG RX REV CODE 250 W/ 637 OVERRIDE(OP): Performed by: OBSTETRICS & GYNECOLOGY

## 2022-07-22 PROCEDURE — A9270 NON-COVERED ITEM OR SERVICE: HCPCS | Performed by: OBSTETRICS & GYNECOLOGY

## 2022-07-22 RX ORDER — ACETAMINOPHEN 500 MG
1000 TABLET ORAL EVERY 6 HOURS PRN
Qty: 30 TABLET | Refills: 0 | Status: SHIPPED | OUTPATIENT
Start: 2022-07-22

## 2022-07-22 RX ORDER — PSEUDOEPHEDRINE HCL 30 MG
100 TABLET ORAL 2 TIMES DAILY PRN
Qty: 60 CAPSULE | Refills: 0 | Status: SHIPPED | OUTPATIENT
Start: 2022-07-22

## 2022-07-22 RX ORDER — IBUPROFEN 800 MG/1
800 TABLET ORAL EVERY 8 HOURS PRN
Qty: 30 TABLET | Refills: 0 | Status: SHIPPED | OUTPATIENT
Start: 2022-07-22

## 2022-07-22 RX ORDER — OXYCODONE HYDROCHLORIDE 5 MG/1
5 TABLET ORAL EVERY 4 HOURS PRN
Qty: 20 TABLET | Refills: 0 | Status: SHIPPED | OUTPATIENT
Start: 2022-07-22 | End: 2022-07-29

## 2022-07-22 RX ADMIN — IBUPROFEN 800 MG: 800 TABLET, FILM COATED ORAL at 06:18

## 2022-07-22 RX ADMIN — ACETAMINOPHEN 1000 MG: 500 TABLET, FILM COATED ORAL at 11:14

## 2022-07-22 RX ADMIN — ACETAMINOPHEN 1000 MG: 500 TABLET, FILM COATED ORAL at 02:49

## 2022-07-22 RX ADMIN — DOCUSATE SODIUM 100 MG: 100 CAPSULE, LIQUID FILLED ORAL at 06:18

## 2022-07-22 ASSESSMENT — PAIN DESCRIPTION - PAIN TYPE
TYPE: ACUTE PAIN

## 2022-07-22 NOTE — CARE PLAN
Problem: Knowledge Deficit - Postpartum  Goal: Patient will verbalize and demonstrate understanding of self and infant care  Outcome: Progressing     Problem: Psychosocial - Postpartum  Goal: Patient will verbalize and demonstrate effective bonding and parenting behavior  Outcome: Progressing     Problem: Altered Physiologic Condition  Goal: Patient physiologically stable as evidenced by normal lochia, palpable uterine involution and vitals within normal limits  Outcome: Progressing   The patient is Stable - Low risk of patient condition declining or worsening    Shift Goals  Clinical Goals: stable vs and pain control  Patient Goals: rest and feed baby q3  Family Goals: bond    Progress made toward(s) clinical / shift goals:  *  Pt reports comfort after pain interventions, Fundus firm and lochia light, VSS, educated on POC, needs met at this time. Questions answered. Will continue to educate.

## 2022-07-22 NOTE — LACTATION NOTE
This note was copied from a baby's chart.  Mother reports baby is latching well, breasts filling with milk, denies pain or discomfort with feedings, declines assistance with breastfeeding prior to discharge home. Reviewed prevention and management of engorgement, hunger cues, cluster feeding, frequency/duration of feeds, infant stool changes and diaper output. Provided list of community breastfeeding resources. Plan is cue based breastfeeding at least 8 or more times per 24 hours. Denies questions/concerns.

## 2022-07-22 NOTE — DISCHARGE INSTRUCTIONS

## 2022-07-22 NOTE — PROGRESS NOTES
1230- Discharge education provided and signed. All printed topics discussed. Emphasis provided on feeding plan, bleeding, and when to call doctor. follow up appointments discussed. All questions answered. No further needs at this time. Bands verified and cuddles removed from infant.     1400- Infant strapped into car seat by MOB and checked by RN. Escorted to vehicle via walking. All belongings Present.

## 2022-07-22 NOTE — DISCHARGE SUMMARY
Discharge Summary:      Ines Friedman    Admit Date:   2022  Discharge Date:  2022     Admitting diagnosis:  Intrauterine pregnancy [Z34.90]  Indication for care in labor or delivery [O75.9]  Discharge Diagnosis: Status post  for repeat.  Pregnancy Complications: hx of previous C/S x 3, elevated Down's syndrome risk  Tubal Ligation:  no        History:  History reviewed. No pertinent past medical history.  OB History    Para Term  AB Living   6 4 4 0 2 4   SAB IAB Ectopic Molar Multiple Live Births   1 0 0 0 0 4      # Outcome Date GA Lbr Serg/2nd Weight Sex Delivery Anes PTL Lv   6 Term 22 39w1d  3.04 kg (6 lb 11.2 oz) F CS-LTranv Spinal N RUTH   5 AB 21 12w0d    TAB      4 Term 18 39w1d  3.175 kg (7 lb) F  Spinal N RUTH      Birth Comments: Repeat C/S walk in pt.    3 SAB 17 6w0d    SAB         Birth Comments: Dilatation and evacuation of the uterus.   2 Term 16 39w0d  2.863 kg (6 lb 5 oz) F CS-LTranv Spinal N RUTH      Birth Comments: Repeat C/S    1 Term 11/19/15 37w5d  2.9 kg (6 lb 6.3 oz) M CS-LTranv Spinal N RUTH      Birth Comments: Primary C/S due preeclampsia      Complications: Pre-eclampsia      Obstetric Comments   Pregnancy unplanned but desired. FOB no involved. Pt works for [employer]. On feet for 12 hours.         Patient has no known allergies.  Patient Active Problem List    Diagnosis Date Noted   • Marijuana use 2022   • Intrauterine pregnancy 2022   • Indication for care in labor or delivery 2022   • Abnormal alpha fetoprotein (AFP) level 2022   • Supervision of high-risk pregnancy 2022   • Abnormal fetal ultrasound 2022   • Hx of preeclampsia, prior pregnancy, currently pregnant 2016   • History of  delivery affecting pregnancy 2016   • Keloid scar 2016   • History of seizure 2016        Hospital Course:   26 y.o. , now para 4, was admitted in labor for  repeat C/S.  The lower uterine segment was noted to be very thin. Patient postpartum course was unremarkable, with progressive advancement in diet , ambulation and toleration of oral analgesia. Patient without complaints today and desires discharge.      Vitals:    07/21/22 0732 07/21/22 1000 07/21/22 1400 07/21/22 1800   BP: 107/62 115/74 115/77 106/67   Pulse: 69 98 77 78   Resp:  17 17 17   Temp: 36.2 °C (97.2 °F) 36.6 °C (97.8 °F) 36.3 °C (97.3 °F) 36.6 °C (97.9 °F)   TempSrc: Temporal Temporal Temporal Temporal   SpO2: 99% 100% 96% 99%   Weight:       Height:           Current Facility-Administered Medications   Medication Dose   • diphenhydrAMINE (BENADRYL) tablet/capsule 25 mg  25 mg    Or   • diphenhydrAMINE (BENADRYL) injection 25 mg  25 mg   • lactated ringers (LR) infusion     • oxytocin (PITOCIN) infusion (for postpartum)  125 mL/hr   • oxytocin (PITOCIN) injection 10 Units  10 Units   • lactated ringers infusion     • ibuprofen (MOTRIN) tablet 800 mg  800 mg    Followed by   • [START ON 7/24/2022] ibuprofen (MOTRIN) tablet 800 mg  800 mg   • acetaminophen (TYLENOL) tablet 1,000 mg  1,000 mg    Followed by   • [START ON 7/24/2022] acetaminophen (TYLENOL) tablet 1,000 mg  1,000 mg   • oxyCODONE immediate-release (ROXICODONE) tablet 5 mg  5 mg   • oxyCODONE immediate release (ROXICODONE) tablet 10 mg  10 mg   • ondansetron (ZOFRAN) syringe/vial injection 4 mg  4 mg    Or   • ondansetron (ZOFRAN ODT) dispertab 4 mg  4 mg   • docusate sodium (COLACE) capsule 100 mg  100 mg   • measles, mumps and rubella vaccine (MMR) injection 0.5 mL  0.5 mL       Exam:  Breast Exam: negative  Abdomen: Abdomen soft, non-tender. BS normal. No masses,  No organomegaly  Fundus Non Tender: yes  Incision: Mepilex dressing in place  Perineum: perineum intact  Extremity: no edema, calves NT     Labs:  Recent Labs     07/20/22  0430 07/20/22 2016   WBC 5.9 6.9   RBC 3.26* 3.69*   HEMOGLOBIN 8.9* 10.2*   HEMATOCRIT 27.1* 31.4*   MCV  83.1 85.1   MCH 27.3 27.6   [facility] 32.8[facility] 32.[facility]   RDW 44.2 44.5   PLATELETCT 119* 146*   MPV 11.3 10.7        Activity:   Discharge to home  Pelvic Rest x 6 weeks    Assessment:  POD # 2 s/p repeat C/S  Doing well, desires discharge to home.   Depo Provera received prior to discharge.         Follow up: [facility] or [facility] in 5 weeks for vaginal ; 1 week for incision check.      Discharge Meds:   Current Outpatient Medications   Medication Sig Dispense Refill   • acetaminophen (TYLENOL) 500 MG Tab Take 2 Tablets by mouth every 6 hours as needed for Mild Pain. 30 Tablet 0   • docusate sodium 100 MG Cap Take 100 mg by mouth 2 times a day as needed for Constipation. 60 Capsule 0   • ibuprofen (MOTRIN) 800 MG Tab Take 1 Tablet by mouth every 8 hours as needed for Moderate Pain. Indications: Joint Damage causing Pain and Loss of Function, Rheumatoid Arthritis 30 Tablet 0   • oxyCODONE immediate-release (ROXICODONE) 5 MG Tab Take 1 Tablet by mouth every four hours as needed for Severe Pain for up to 7 days. 20 Tablet 0       Nery Knight M.D.

## 2022-07-31 NOTE — PROGRESS NOTES
with Unknown EDC due to no PNC. Pt presents to L&D with complaints of pelvic pressure and tightening of her abdomen. Pt taken to triage for assessment.      TOCO and EFM applied, VSS.      NEETU Turner updated on pt status, orders received.     2315 RN at bedside, pt's sister has called to see if the pt is in the hospital. Pt states that it is okay to let her sister know that she is here and how she is doing.     231 Pt's sister updated on pt status, RN given phone number to call if needed.     0006 US at bedside    0040 NEETU Turner at bedside, POC discussed. Pt would like to have a tubal ligation when she is c/s. Pt educated that it would have required PNC and more notice but that we would put it in her chart that she has requested that. PT can call [facility] in the morning to see if they could make an appointment.     0110 NEETU Turner updated on US results, orders for discharge received.     0115 RN at bedside, pt encouraged to get a prenatal vitamin and iron supplement based on her lab values. Pt educated on high iron foods and given a list to help her remember. Labor precautions given and all questions answered.     0120 Pt discharged home in stable condition.     
No

## 2022-09-19 NOTE — PROGRESS NOTES
"Pt very upset.  During rounds pt sitting on floor, rocking and hysterically crying.  Emotional support provided.  Pt questioned on suicidal ideation, pt denies any thoughts of harming herself.  Pt states \" feels safe here, but afraid when she leaves without my baby\"  No support from family.  States lives with a friend   " negative...

## 2022-09-26 NOTE — ED NOTES
"Pt ambulatory to triage c/o had a \"mental break b/c I saw my ex girlfriend\" pt is calm and cooperative. Pt denies SI denies HI. Pt states \"I feel fine now\" pt denies any needs from hospital and now has no medical complaint. Pt states that she would rather not check in. Pt ambulatory back to [facility] where she calls a friend for a ride home  " [Lower Ext Edema] : lower extremity edema [Limb Pain] : limb pain [Limb Swelling] : limb swelling [Negative] : Heme/Lymph

## 2023-06-07 ENCOUNTER — NON-PROVIDER VISIT (OUTPATIENT)
Dept: OCCUPATIONAL MEDICINE | Facility: CLINIC | Age: 28
End: 2023-06-07

## 2023-06-07 DIAGNOSIS — Z02.1 PRE-EMPLOYMENT DRUG SCREENING: ICD-10-CM

## 2023-06-07 LAB
AMP AMPHETAMINE: NORMAL
COC COCAINE: NORMAL
INT CON NEG: NORMAL
INT CON POS: NORMAL
MET METHAMPHETAMINES: NORMAL
OPI OPIATES: NORMAL
PCP PHENCYCLIDINE: NORMAL
POC DRUG COMMENT 753798-OCCUPATIONAL HEALTH: NEGATIVE
THC: NORMAL

## 2023-06-07 PROCEDURE — 80305 DRUG TEST PRSMV DIR OPT OBS: CPT | Performed by: PREVENTIVE MEDICINE

## 2023-07-26 NOTE — ED NOTES
Patient called for an appointment for orange staining on hand,  does not have anything available for the rest of this year.  Declined other provider and could go any time dr Juan Francisco Pruitt could fit her in Report to Rajani SHERMAN

## 2023-08-22 ENCOUNTER — HOSPITAL ENCOUNTER (OUTPATIENT)
Facility: MEDICAL CENTER | Age: 28
End: 2023-08-22
Attending: PREVENTIVE MEDICINE
Payer: COMMERCIAL

## 2023-08-22 ENCOUNTER — NON-PROVIDER VISIT (OUTPATIENT)
Dept: OCCUPATIONAL MEDICINE | Facility: CLINIC | Age: 28
End: 2023-08-22

## 2023-08-22 ENCOUNTER — OFFICE VISIT (OUTPATIENT)
Dept: OCCUPATIONAL MEDICINE | Facility: CLINIC | Age: 28
End: 2023-08-22

## 2023-08-22 DIAGNOSIS — Z02.89 VISIT FOR OCCUPATIONAL HEALTH EXAMINATION: ICD-10-CM

## 2023-08-22 DIAGNOSIS — Z02.89 VISIT FOR OCCUPATIONAL HEALTH EXAMINATION: Primary | ICD-10-CM

## 2023-08-22 PROCEDURE — 8915 PR COMPREHENSIVE PHYSICAL: Performed by: PREVENTIVE MEDICINE

## 2023-08-22 PROCEDURE — 86480 TB TEST CELL IMMUN MEASURE: CPT | Performed by: PREVENTIVE MEDICINE

## 2023-08-23 LAB
GAMMA INTERFERON BACKGROUND BLD IA-ACNC: 0.04 IU/ML
M TB IFN-G BLD-IMP: NEGATIVE
M TB IFN-G CD4+ BCKGRND COR BLD-ACNC: 0 IU/ML
MITOGEN IGNF BCKGRD COR BLD-ACNC: >10 IU/ML
QFT TB2 - NIL TBQ2: -0.01 IU/ML

## (undated) DEVICE — PROTECTOR ULNA NERVE - (36PR/CA)

## (undated) DEVICE — SODIUM CHL IRRIGATION 0.9% 1000ML (12EA/CA)

## (undated) DEVICE — TUBING CLEARLINK DUO-VENT - C-FLO (48EA/CA)

## (undated) DEVICE — TUBE CONNECTING SUCTION - CLEAR PLASTIC STERILE 72 IN (50EA/CA)

## (undated) DEVICE — HEAD HOLDER JUNIOR/ADULT

## (undated) DEVICE — KIT  I.V. START (100EA/CA)

## (undated) DEVICE — PAD SANITARY 11IN MAXI IND WRAPPED  (12EA/PK 24PK/CA)

## (undated) DEVICE — PACK ROOM TURNOVER L&D (12/CA)

## (undated) DEVICE — LACTATED RINGERS INJ 1000 ML - (14EA/CA 60CA/PF)

## (undated) DEVICE — CANISTER SUCTION 3000ML MECHANICAL FILTER AUTO SHUTOFF MEDI-VAC NONSTERILE LF DISP  (40EA/CA)

## (undated) DEVICE — TRAY SPINAL ANESTHESIA NON-SAFETY (10/CA)

## (undated) DEVICE — MASK AIRWAY SIZE 3 UNIQUE SILICON (10/BX)

## (undated) DEVICE — SENSOR SPO2 NEO LNCS ADHESIVE (20/BX) SEE USER NOTES

## (undated) DEVICE — STAPLER SKIN DISP - (6/BX 10BX/CA) VISISTAT

## (undated) DEVICE — LEAD SET 6 DISP. EKG NIHON KOHDEN (100EA/CA)

## (undated) DEVICE — RETRACTOR O C SECTION LRY - (5/BX)

## (undated) DEVICE — SUTURE 4-0 MONOCRYL PLUS PS-1 - 27 INCH (36/BX)

## (undated) DEVICE — SUTURE 2-0 VICRYL PLUS CT-1 36 (36PK/BX)"

## (undated) DEVICE — DETERGENT RENUZYME PLUS 10 OZ PACKET (50/BX)

## (undated) DEVICE — CANISTER SUCTION RIGID RED 1500CC (40EA/CA)

## (undated) DEVICE — SUTURE 0 VICRYL PLUS CT-1 - 36 INCH (36/BX)

## (undated) DEVICE — GLOVE BIOGEL PI INDICATOR SZ 6.5 SURGICAL PF LF - (50/BX 4BX/CA)

## (undated) DEVICE — CATHETER IV NON-SAFETY 18 GA X 1 1/4 (50/BX 4BX/CA)

## (undated) DEVICE — WATER IRRIGATION STERILE 1000ML (12EA/CA)

## (undated) DEVICE — TRAY SRGPRP PVP IOD WT PRP - (20/CA)

## (undated) DEVICE — MASK ANESTHESIA ADULT  - (100/CA)

## (undated) DEVICE — SET EXTENSION WITH 2 PORTS (48EA/CA) ***PART #2C8610 IS A SUBSTITUTE*****

## (undated) DEVICE — GLOVE BIOGEL INDICATOR SZ 6.5 SURGICAL PF LTX - (50PR/BX 4BX/CA)

## (undated) DEVICE — PACK C-SECTION (2EA/CA)

## (undated) DEVICE — WATER IRRIG. STER. 1500 ML - (9/CA)

## (undated) DEVICE — GLOVE BIOGEL SZ 6 PF LATEX - (50EA/BX 4BX/CA)

## (undated) DEVICE — ELECTRODE DUAL RETURN W/ CORD - (50/PK)

## (undated) DEVICE — SLEEVE, SEQUENTIAL CALF REG

## (undated) DEVICE — SOLUTION PLASMA-LYTE PH 7.4 INJ 1000ML  (14EA/CA)

## (undated) DEVICE — TUBING D & E COLLECTION SET (50EA/PK)

## (undated) DEVICE — SUTUREABS02-0 CT1 27IN - (36EA/BX)

## (undated) DEVICE — DRESSING POST OP BORDER 4 X 10 (5EA/BX)

## (undated) DEVICE — SUCTION INSTRUMENT YANKAUER BULBOUS TIP W/O VENT (50EA/CA)

## (undated) DEVICE — SUTURE 1 CHROMIC GUTCT-1 27 (36PK/BX)"

## (undated) DEVICE — CATHETER IV 20 GA X 1-1/4 ---SURG.& SDS ONLY--- (50EA/BX)

## (undated) DEVICE — ELECTRODE 850 FOAM ADHESIVE - HYDROGEL RADIOTRNSPRNT (50/PK)

## (undated) DEVICE — GLOVE BIOGEL SZ 7 SURGICAL PF LTX - (50PR/BX 4BX/CA)

## (undated) DEVICE — VACURETTE 7MM CURVED 10/PKG

## (undated) DEVICE — BLANKET UNDERBODY FULL ACCES - (5/CA)

## (undated) DEVICE — PLUMEPEN ULTRA 3/8 IN X 10 FT HOSE (20EA/CA)

## (undated) DEVICE — SET LEADWIRE 5 LEAD BEDSIDE DISPOSABLE ECG (1SET OF 5/EA)

## (undated) DEVICE — KIT ANESTHESIA W/CIRCUIT & 3/LT BAG W/FILTER (20EA/CA)

## (undated) DEVICE — GOWN WARMING STANDARD FLEX - (30/CA)

## (undated) DEVICE — Device

## (undated) DEVICE — GLOVE BIOGEL SZ 6.5 SURGICAL PF LTX (50PR/BX 4BX/CA)

## (undated) DEVICE — CHLORAPREP 26 ML APPLICATOR - ORANGE TINT(25/CA)